# Patient Record
Sex: MALE | Race: WHITE | Employment: UNEMPLOYED | URBAN - METROPOLITAN AREA
[De-identification: names, ages, dates, MRNs, and addresses within clinical notes are randomized per-mention and may not be internally consistent; named-entity substitution may affect disease eponyms.]

---

## 2017-01-03 ENCOUNTER — ANESTHESIA (OUTPATIENT)
Dept: SURGERY | Facility: CLINIC | Age: 37
End: 2017-01-03
Payer: MEDICAID

## 2017-01-03 ENCOUNTER — ANESTHESIA EVENT (OUTPATIENT)
Dept: SURGERY | Facility: CLINIC | Age: 37
End: 2017-01-03
Payer: MEDICAID

## 2017-01-03 ENCOUNTER — APPOINTMENT (OUTPATIENT)
Dept: GENERAL RADIOLOGY | Facility: CLINIC | Age: 37
End: 2017-01-03
Attending: NEUROLOGICAL SURGERY
Payer: MEDICAID

## 2017-01-03 ENCOUNTER — SURGERY (OUTPATIENT)
Age: 37
End: 2017-01-03

## 2017-01-03 PROCEDURE — 25800025 ZZH RX 258: Performed by: NURSE ANESTHETIST, CERTIFIED REGISTERED

## 2017-01-03 PROCEDURE — 25000128 H RX IP 250 OP 636: Performed by: NURSE ANESTHETIST, CERTIFIED REGISTERED

## 2017-01-03 PROCEDURE — 25000125 ZZHC RX 250: Performed by: PHYSICIAN ASSISTANT

## 2017-01-03 PROCEDURE — 40000277 XR SURGERY CARM FLUORO LESS THAN 5 MIN W STILLS: Mod: TC

## 2017-01-03 PROCEDURE — 25000125 ZZHC RX 250: Performed by: NURSE ANESTHETIST, CERTIFIED REGISTERED

## 2017-01-03 RX ORDER — LIDOCAINE HYDROCHLORIDE 20 MG/ML
INJECTION, SOLUTION INFILTRATION; PERINEURAL PRN
Status: DISCONTINUED | OUTPATIENT
Start: 2017-01-03 | End: 2017-01-03

## 2017-01-03 RX ORDER — FENTANYL CITRATE 50 UG/ML
INJECTION, SOLUTION INTRAMUSCULAR; INTRAVENOUS PRN
Status: DISCONTINUED | OUTPATIENT
Start: 2017-01-03 | End: 2017-01-03

## 2017-01-03 RX ORDER — NEOSTIGMINE METHYLSULFATE 1 MG/ML
VIAL (ML) INJECTION PRN
Status: DISCONTINUED | OUTPATIENT
Start: 2017-01-03 | End: 2017-01-03

## 2017-01-03 RX ORDER — PROPOFOL 10 MG/ML
INJECTION, EMULSION INTRAVENOUS PRN
Status: DISCONTINUED | OUTPATIENT
Start: 2017-01-03 | End: 2017-01-03

## 2017-01-03 RX ORDER — ONDANSETRON 2 MG/ML
INJECTION INTRAMUSCULAR; INTRAVENOUS PRN
Status: DISCONTINUED | OUTPATIENT
Start: 2017-01-03 | End: 2017-01-03

## 2017-01-03 RX ORDER — DEXAMETHASONE SODIUM PHOSPHATE 10 MG/ML
INJECTION, SOLUTION INTRAMUSCULAR; INTRAVENOUS PRN
Status: DISCONTINUED | OUTPATIENT
Start: 2017-01-03 | End: 2017-01-03

## 2017-01-03 RX ORDER — DEXMEDETOMIDINE HYDROCHLORIDE 100 UG/ML
INJECTION, SOLUTION INTRAVENOUS CONTINUOUS PRN
Status: DISCONTINUED | OUTPATIENT
Start: 2017-01-03 | End: 2017-01-03

## 2017-01-03 RX ORDER — SODIUM CHLORIDE, SODIUM LACTATE, POTASSIUM CHLORIDE, CALCIUM CHLORIDE 600; 310; 30; 20 MG/100ML; MG/100ML; MG/100ML; MG/100ML
INJECTION, SOLUTION INTRAVENOUS CONTINUOUS PRN
Status: DISCONTINUED | OUTPATIENT
Start: 2017-01-03 | End: 2017-01-03

## 2017-01-03 RX ORDER — VECURONIUM BROMIDE 1 MG/ML
INJECTION, POWDER, LYOPHILIZED, FOR SOLUTION INTRAVENOUS PRN
Status: DISCONTINUED | OUTPATIENT
Start: 2017-01-03 | End: 2017-01-03

## 2017-01-03 RX ORDER — GLYCOPYRROLATE 0.2 MG/ML
INJECTION, SOLUTION INTRAMUSCULAR; INTRAVENOUS PRN
Status: DISCONTINUED | OUTPATIENT
Start: 2017-01-03 | End: 2017-01-03

## 2017-01-03 RX ORDER — EPHEDRINE SULFATE 50 MG/ML
INJECTION, SOLUTION INTRAMUSCULAR; INTRAVENOUS; SUBCUTANEOUS PRN
Status: DISCONTINUED | OUTPATIENT
Start: 2017-01-03 | End: 2017-01-03

## 2017-01-03 RX ADMIN — DEXMEDETOMIDINE 4 MCG: 100 INJECTION, SOLUTION, CONCENTRATE INTRAVENOUS at 09:21

## 2017-01-03 RX ADMIN — DEXMEDETOMIDINE 0.3 MCG/KG/HR: 100 INJECTION, SOLUTION, CONCENTRATE INTRAVENOUS at 07:59

## 2017-01-03 RX ADMIN — METHYLPREDNISOLONE ACETATE 40 MG: 40 INJECTION, SUSPENSION INTRA-ARTICULAR; INTRALESIONAL; INTRAMUSCULAR; SOFT TISSUE at 09:00

## 2017-01-03 RX ADMIN — GLYCOPYRROLATE 0.6 MG: 0.2 INJECTION, SOLUTION INTRAMUSCULAR; INTRAVENOUS at 09:12

## 2017-01-03 RX ADMIN — PHENYLEPHRINE HYDROCHLORIDE 100 MCG: 10 INJECTION, SOLUTION INTRAMUSCULAR; INTRAVENOUS; SUBCUTANEOUS at 08:50

## 2017-01-03 RX ADMIN — DEXAMETHASONE SODIUM PHOSPHATE 4 MG: 10 INJECTION, SOLUTION INTRAMUSCULAR; INTRAVENOUS at 08:05

## 2017-01-03 RX ADMIN — BUPIVACAINE HYDROCHLORIDE AND EPINEPHRINE BITARTRATE 15 ML: 2.5; .005 INJECTION, SOLUTION INFILTRATION; PERINEURAL at 09:08

## 2017-01-03 RX ADMIN — PROPOFOL 200 MG: 10 INJECTION, EMULSION INTRAVENOUS at 07:34

## 2017-01-03 RX ADMIN — VECURONIUM BROMIDE 1 MG: 1 INJECTION, POWDER, LYOPHILIZED, FOR SOLUTION INTRAVENOUS at 08:55

## 2017-01-03 RX ADMIN — Medication 1000 ML: at 08:15

## 2017-01-03 RX ADMIN — SODIUM CHLORIDE, POTASSIUM CHLORIDE, SODIUM LACTATE AND CALCIUM CHLORIDE: 600; 310; 30; 20 INJECTION, SOLUTION INTRAVENOUS at 08:40

## 2017-01-03 RX ADMIN — PHENYLEPHRINE HYDROCHLORIDE 50 MCG: 10 INJECTION, SOLUTION INTRAMUSCULAR; INTRAVENOUS; SUBCUTANEOUS at 08:29

## 2017-01-03 RX ADMIN — CEFAZOLIN SODIUM 2 G: 2 INJECTION, SOLUTION INTRAVENOUS at 07:43

## 2017-01-03 RX ADMIN — ONDANSETRON 4 MG: 2 INJECTION INTRAMUSCULAR; INTRAVENOUS at 09:09

## 2017-01-03 RX ADMIN — DEXMEDETOMIDINE 8 MCG: 100 INJECTION, SOLUTION, CONCENTRATE INTRAVENOUS at 09:12

## 2017-01-03 RX ADMIN — FENTANYL CITRATE 100 MCG: 50 INJECTION, SOLUTION INTRAMUSCULAR; INTRAVENOUS at 07:34

## 2017-01-03 RX ADMIN — PHENYLEPHRINE HYDROCHLORIDE 50 MCG: 10 INJECTION, SOLUTION INTRAMUSCULAR; INTRAVENOUS; SUBCUTANEOUS at 08:40

## 2017-01-03 RX ADMIN — EPHEDRINE SULFATE 5 MG: 50 INJECTION INTRAMUSCULAR; INTRAVENOUS; SUBCUTANEOUS at 08:40

## 2017-01-03 RX ADMIN — EPHEDRINE SULFATE 5 MG: 50 INJECTION INTRAMUSCULAR; INTRAVENOUS; SUBCUTANEOUS at 08:24

## 2017-01-03 RX ADMIN — DEXMEDETOMIDINE 8 MCG: 100 INJECTION, SOLUTION, CONCENTRATE INTRAVENOUS at 09:16

## 2017-01-03 RX ADMIN — LIDOCAINE HYDROCHLORIDE 100 MG: 20 INJECTION, SOLUTION INFILTRATION; PERINEURAL at 07:34

## 2017-01-03 RX ADMIN — SODIUM CHLORIDE, POTASSIUM CHLORIDE, SODIUM LACTATE AND CALCIUM CHLORIDE: 600; 310; 30; 20 INJECTION, SOLUTION INTRAVENOUS at 07:32

## 2017-01-03 RX ADMIN — EPHEDRINE SULFATE 5 MG: 50 INJECTION INTRAMUSCULAR; INTRAVENOUS; SUBCUTANEOUS at 08:29

## 2017-01-03 RX ADMIN — THROMBIN, TOPICAL (BOVINE) 5000 UNITS: KIT at 08:15

## 2017-01-03 RX ADMIN — DEXMEDETOMIDINE 4 MCG: 100 INJECTION, SOLUTION, CONCENTRATE INTRAVENOUS at 09:19

## 2017-01-03 RX ADMIN — NEOSTIGMINE METHYLSULFATE 3 MG: 1 INJECTION INTRAMUSCULAR; INTRAVENOUS; SUBCUTANEOUS at 09:12

## 2017-01-03 RX ADMIN — ROCURONIUM BROMIDE 50 MG: 10 INJECTION INTRAVENOUS at 07:35

## 2017-01-03 RX ADMIN — MIDAZOLAM HYDROCHLORIDE 2 MG: 1 INJECTION, SOLUTION INTRAMUSCULAR; INTRAVENOUS at 07:32

## 2017-01-03 ASSESSMENT — ENCOUNTER SYMPTOMS: SEIZURES: 0

## 2017-01-03 ASSESSMENT — LIFESTYLE VARIABLES: TOBACCO_USE: 1

## 2017-01-03 ASSESSMENT — COPD QUESTIONNAIRES: COPD: 0

## 2017-01-03 NOTE — ANESTHESIA CARE TRANSFER NOTE
Patient: Ko Bell    DISCECTOMY LUMBAR MINIMALLY INVASIVE ONE LEVEL (Left Spine)  Additional InformationProcedure(s):  LUMBAR FOUR TO LUMBAR FIVE LEFT MINIMALLY INVASIVE MICRODISECTOMY  - Wound Class: I-Clean    Diagnosis: LEFT L 4-5 DISC HERNIATION   Diagnosis Additional Information: No value filed.    Anesthesia Type:   General, ETT     Note:  Airway :Face Mask  Patient transferred to:PACU        Vitals: (Last set prior to Anesthesia Care Transfer)              Electronically Signed By: MARTHA Reid CRNA  January 3, 2017  9:30 AM
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
## 2017-01-03 NOTE — ANESTHESIA POSTPROCEDURE EVALUATION
Patient: Ko Bell    DISCECTOMY LUMBAR MINIMALLY INVASIVE ONE LEVEL (Left Spine)  Additional InformationProcedure(s):  LUMBAR FOUR TO LUMBAR FIVE LEFT MINIMALLY INVASIVE MICRODISECTOMY  - Wound Class: I-Clean    Diagnosis:LEFT L 4-5 DISC HERNIATION   Diagnosis Additional Information: No value filed.    Anesthesia Type:  General, ETT    Note:  Anesthesia Post Evaluation    Patient location during evaluation: PACU  Patient participation: Able to fully participate in evaluation  Level of consciousness: awake, awake and alert and responsive to verbal stimuli  Pain management: adequate  Airway patency: patent  Cardiovascular status: acceptable  Respiratory status: acceptable  Hydration status: acceptable  PONV: none     Anesthetic complications: None          Last vitals:  Filed Vitals:    01/03/17 1030 01/03/17 1039 01/03/17 1045   BP: 124/86 110/86    Pulse:      Temp:  36.7  C (98  F)    Resp: 16 16 16   SpO2: 100% 100%        Electronically Signed By: Angela Lyle  January 3, 2017  11:12 AM

## 2017-01-03 NOTE — ADDENDUM NOTE
Addendum  created 01/03/17 1142 by Angela Rubi APRN CRNA    Modules edited: Anesthesia Flowsheet

## 2017-01-04 ENCOUNTER — DOCUMENTATION ONLY (OUTPATIENT)
Dept: NEUROSURGERY | Facility: CLINIC | Age: 37
End: 2017-01-04

## 2017-01-04 NOTE — PROGRESS NOTES
Contacted patient for f/u call, s/p microdiscectomy 1/3/17. No answer, LVM. Advised patient to call back with questions or concerns. Reminded him to schedule his 6 weeks post op appt as well.

## 2017-01-06 ENCOUNTER — TELEPHONE (OUTPATIENT)
Dept: NEUROSURGERY | Facility: CLINIC | Age: 37
End: 2017-01-06

## 2017-01-06 NOTE — TELEPHONE ENCOUNTER
Patient's wife called, requesting oxycodone refill for patient. Informed her we do not have a provider in clinic at this time, the earliest it can be done is Monday morning. She agreed to  in [place] on Monday. Will forward message to Dr. Toth for refill.

## 2017-01-09 DIAGNOSIS — M54.16 LUMBAR RADICULOPATHY: Primary | ICD-10-CM

## 2017-01-09 RX ORDER — OXYCODONE HYDROCHLORIDE 5 MG/1
5-10 TABLET ORAL
Qty: 60 TABLET | Refills: 0 | Status: SHIPPED | OUTPATIENT
Start: 2017-01-09 | End: 2017-01-12

## 2017-01-09 NOTE — TELEPHONE ENCOUNTER
"Dr. Toth refilled oxycodone script. Patient's wife (Reva) coming to [place] to . Reva also called this morning, stating patient's incision has a bump on it, but otherwise \"looks fine\". Denies redness, drainage, any other signs of infection. Advised to have patient come in for an incision check. Reva said she'd rather continue to monitor and call if anything changes.   "

## 2017-01-10 ENCOUNTER — OFFICE VISIT (OUTPATIENT)
Dept: NEUROSURGERY | Facility: CLINIC | Age: 37
End: 2017-01-10
Attending: NURSE PRACTITIONER
Payer: MEDICAID

## 2017-01-10 VITALS
SYSTOLIC BLOOD PRESSURE: 127 MMHG | HEIGHT: 68 IN | WEIGHT: 147 LBS | OXYGEN SATURATION: 99 % | DIASTOLIC BLOOD PRESSURE: 82 MMHG | BODY MASS INDEX: 22.28 KG/M2 | HEART RATE: 98 BPM | TEMPERATURE: 96.8 F

## 2017-01-10 DIAGNOSIS — Z98.890 S/P LUMBAR MICRODISCECTOMY: Primary | ICD-10-CM

## 2017-01-10 PROCEDURE — 40000269 ZZH STATISTIC NO CHARGE FACILITY FEE: Performed by: NURSE PRACTITIONER

## 2017-01-10 RX ORDER — CEPHALEXIN 500 MG/1
500 CAPSULE ORAL 2 TIMES DAILY
Qty: 20 CAPSULE | Refills: 0 | Status: SHIPPED | OUTPATIENT
Start: 2017-01-10 | End: 2017-01-23

## 2017-01-10 ASSESSMENT — PAIN SCALES - GENERAL: PAINLEVEL: SEVERE PAIN (6)

## 2017-01-10 NOTE — PATIENT INSTRUCTIONS
Keflex escribed to patient's pharmacy.   Call clinic if any signs of infection occur: redness, drainage, warmth, swelling, fever.

## 2017-01-10 NOTE — NURSING NOTE
"Ko Bell is a 36 year old male who presents for:  Chief Complaint   Patient presents with     Neurologic Problem     wound check         Initial Vitals:  /82 mmHg  Pulse 98  Temp(Src) 96.8  F (36  C) (Oral)  Ht 5' 8\" (1.727 m)  Wt 147 lb (66.679 kg)  BMI 22.36 kg/m2  SpO2 99% Estimated body mass index is 22.36 kg/(m^2) as calculated from the following:    Height as of this encounter: 5' 8\" (1.727 m).    Weight as of this encounter: 147 lb (66.679 kg).. Body surface area is 1.79 meters squared. BP completed using cuff size: regular  Severe Pain (6)    Do you feel safe in your environment?  Yes  Do you need any refills today? No    Nursing Comments: wound check .  Patient rates his pain today as 6      5 min. nursing intake time  Amanda Evans MA       Discharge plan: Keflex escribed to patient's pharmacy.   Call clinic if any signs of infection occur: redness, drainage, warmth, swelling, fever.   2 min. nursing discharge time  Amanda Evans MA            "

## 2017-01-10 NOTE — MR AVS SNAPSHOT
After Visit Summary   1/10/2017    Ko Bell    MRN: [id number]           Patient Information     Date Of Birth          1980        Visit Information        Provider Department      1/10/2017 1:00 PM Letty Vincent APRN CNP [facility]        Today's Diagnoses     S/P lumbar microdiscectomy    -  1       Care Instructions    Keflex escribed to patient's pharmacy.   Call clinic if any signs of infection occur: redness, drainage, warmth, swelling, fever.         Follow-ups after your visit        Who to contact     If you have questions or need follow up information about today's clinic visit or your schedule please contact [facility] directly at [phone].  Normal or non-critical lab and imaging results will be communicated to you by SkuRunhart, letter or phone within 4 business days after the clinic has received the results. If you do not hear from us within 7 days, please contact the clinic through SkuRunhart or phone. If you have a critical or abnormal lab result, we will notify you by phone as soon as possible.  Submit refill requests through KnowRe or call your pharmacy and they will forward the refill request to us. Please allow 3 business days for your refill to be completed.          Additional Information About Your Visit        MyChart Information     KnowRe gives you secure access to your electronic health record. If you see a primary care provider, you can also send messages to your care team and make appointments. If you have questions, please call your primary care clinic.  If you do not have a primary care provider, please call [phone] and they will assist you.        Care EveryWhere ID     This is your Care EveryWhere ID. This could be used by other organizations to access your [facility] medical records  [id number]        Your Vitals Were     Pulse Temperature Height BMI (Body Mass Index) Pulse Oximetry        "98 96.8  F (36  C) (Oral) 5' 8\" (1.727 m) 22.36 kg/m2 99%        Blood Pressure from Last 3 Encounters:   01/10/17 127/82   01/03/17 110/86   12/30/16 132/88    Weight from Last 3 Encounters:   01/10/17 147 lb (66.679 kg)   01/03/17 147 lb 12.8 oz (67.042 kg)   12/30/16 149 lb (67.586 kg)              Today, you had the following     No orders found for display         Today's Medication Changes          These changes are accurate as of: 1/10/17  1:14 PM.  If you have any questions, ask your nurse or doctor.               Start taking these medicines.        Dose/Directions    cephALEXin 500 MG capsule   Commonly known as:  KEFLEX   Used for:  S/P lumbar microdiscectomy        Dose:  500 mg   Take 1 capsule (500 mg) by mouth 2 times daily   Quantity:  20 capsule   Refills:  0            Where to get your medicines      These medications were sent to [facility] [address] [place] [address] - [address]  AT [facility] [address]  [address] , [facility] [address]     Phone:  [phone]    - cephALEXin 500 MG capsule             Primary Care Provider    None Specified       No primary provider on file.        Thank you!     Thank you for choosing [facility]  for your care. Our goal is always to provide you with excellent care. Hearing back from our patients is one way we can continue to improve our services. Please take a few minutes to complete the written survey that you may receive in the mail after your visit with us. Thank you!             Your Updated Medication List - Protect others around you: Learn how to safely use, store and throw away your medicines at www.disposemymeds.org.          This list is accurate as of: 1/10/17  1:14 PM.  Always use your most recent med list.                   Brand Name Dispense Instructions for use    cephALEXin 500 MG capsule    KEFLEX    20 capsule    Take 1 capsule (500 mg) by mouth 2 times daily       oxyCODONE 5 MG " IR tablet    ROXICODONE    60 tablet    Take 1-2 tablets (5-10 mg) by mouth every 3 hours as needed for pain or other (Moderate to Severe)       SEROQUEL PO      Take 100 mg by mouth At Bedtime

## 2017-01-10 NOTE — NURSING NOTE
"Patient is s/p L4-5 microdiscectomy on 1/3/17 with Dr. Toth. Patient came to clinic today for incision check. There is swelling from top to bottom of incision, no redness, warmth, drainage, or fever. Patient's pain is minimal at the incision site, but feels like he's \"laying on a tennis ball\" at times. Letty Vincent CNP also examined the site and recommended Keflex. Escribed to patient's pharmacy. Advised patient and wife to call clinic if any signs of infection or changes occur.   "

## 2017-01-11 ENCOUNTER — TELEPHONE (OUTPATIENT)
Dept: NEUROSURGERY | Facility: CLINIC | Age: 37
End: 2017-01-11

## 2017-01-11 DIAGNOSIS — M54.16 LUMBAR RADICULOPATHY: Primary | ICD-10-CM

## 2017-01-11 NOTE — TELEPHONE ENCOUNTER
Patient requesting refill for oxycodone. Please mail to [facility] in [place]. Will communicate with Kirt Madera PA-C for approval.

## 2017-01-11 NOTE — TELEPHONE ENCOUNTER
Reva called and wants to talk to Isadora in regards to Ko. She wanted to follow up about Ko's bulge on his back, and well as refilling his medication

## 2017-01-12 RX ORDER — OXYCODONE HYDROCHLORIDE 5 MG/1
5-10 TABLET ORAL
Qty: 60 TABLET | Refills: 0 | Status: SHIPPED | OUTPATIENT
Start: 2017-01-12 | End: 2017-01-20

## 2017-01-16 ENCOUNTER — MYC REFILL (OUTPATIENT)
Dept: NEUROSURGERY | Facility: CLINIC | Age: 37
End: 2017-01-16

## 2017-01-18 ENCOUNTER — TELEPHONE (OUTPATIENT)
Dept: NEUROSURGERY | Facility: CLINIC | Age: 37
End: 2017-01-18

## 2017-01-20 ENCOUNTER — HOSPITAL ENCOUNTER (EMERGENCY)
Facility: CLINIC | Age: 37
Discharge: HOME | End: 2017-01-20
Attending: EMERGENCY MEDICINE | Admitting: EMERGENCY MEDICINE
Payer: MEDICAID

## 2017-01-20 VITALS
OXYGEN SATURATION: 100 % | HEIGHT: 67 IN | SYSTOLIC BLOOD PRESSURE: 136 MMHG | HEART RATE: 91 BPM | WEIGHT: 154 LBS | TEMPERATURE: 98.1 F | BODY MASS INDEX: 24.17 KG/M2 | DIASTOLIC BLOOD PRESSURE: 87 MMHG | RESPIRATION RATE: 18 BRPM

## 2017-01-20 DIAGNOSIS — G89.18 POSTOPERATIVE PAIN: ICD-10-CM

## 2017-01-20 LAB
ANION GAP SERPL CALCULATED.3IONS-SCNC: 8 MMOL/L (ref 3–14)
APPEARANCE FLD: NORMAL
BASOPHILS # BLD AUTO: 0 10E9/L (ref 0–0.2)
BASOPHILS NFR BLD AUTO: 0.7 %
BUN SERPL-MCNC: 16 MG/DL (ref 7–30)
CALCIUM SERPL-MCNC: 8.5 MG/DL (ref 8.5–10.1)
CHLORIDE SERPL-SCNC: 104 MMOL/L (ref 94–109)
CO2 SERPL-SCNC: 26 MMOL/L (ref 20–32)
COLOR FLD: NORMAL
CREAT SERPL-MCNC: 0.82 MG/DL (ref 0.66–1.25)
CRP SERPL-MCNC: <2.9 MG/L (ref 0–8)
DIFFERENTIAL METHOD BLD: NORMAL
EOSINOPHIL # BLD AUTO: 0.3 10E9/L (ref 0–0.7)
EOSINOPHIL NFR BLD AUTO: 4.5 %
EOSINOPHIL NFR FLD MANUAL: 8 %
ERYTHROCYTE [DISTWIDTH] IN BLOOD BY AUTOMATED COUNT: 12.7 % (ref 10–15)
ERYTHROCYTE [SEDIMENTATION RATE] IN BLOOD BY WESTERGREN METHOD: 6 MM/H (ref 0–15)
GFR SERPL CREATININE-BSD FRML MDRD: NORMAL ML/MIN/1.7M2
GLUCOSE SERPL-MCNC: 89 MG/DL (ref 70–99)
GRAM STN SPEC: NORMAL
HCT VFR BLD AUTO: 45.5 % (ref 40–53)
HGB BLD-MCNC: 15.3 G/DL (ref 13.3–17.7)
IMM GRANULOCYTES # BLD: 0 10E9/L (ref 0–0.4)
IMM GRANULOCYTES NFR BLD: 0.5 %
LYMPHOCYTES # BLD AUTO: 2 10E9/L (ref 0.8–5.3)
LYMPHOCYTES NFR BLD AUTO: 34.9 %
LYMPHOCYTES NFR FLD MANUAL: 41 %
MCH RBC QN AUTO: 29.3 PG (ref 26.5–33)
MCHC RBC AUTO-ENTMCNC: 33.6 G/DL (ref 31.5–36.5)
MCV RBC AUTO: 87 FL (ref 78–100)
MICRO REPORT STATUS: NORMAL
MONOCYTES # BLD AUTO: 0.5 10E9/L (ref 0–1.3)
MONOCYTES NFR BLD AUTO: 7.7 %
MONOS+MACROS NFR FLD MANUAL: 9 %
NEUTROPHILS # BLD AUTO: 3 10E9/L (ref 1.6–8.3)
NEUTROPHILS NFR BLD AUTO: 51.7 %
NEUTS BAND NFR FLD MANUAL: 42 %
NRBC # BLD AUTO: 0 10*3/UL
NRBC BLD AUTO-RTO: 0 /100
PLATELET # BLD AUTO: 165 10E9/L (ref 150–450)
POTASSIUM SERPL-SCNC: 4 MMOL/L (ref 3.4–5.3)
RBC # BLD AUTO: 5.23 10E12/L (ref 4.4–5.9)
SODIUM SERPL-SCNC: 138 MMOL/L (ref 133–144)
SPECIMEN SOURCE FLD: NORMAL
SPECIMEN SOURCE: NORMAL
WBC # BLD AUTO: 5.8 10E9/L (ref 4–11)
WBC # FLD AUTO: 343 /UL

## 2017-01-20 PROCEDURE — 86140 C-REACTIVE PROTEIN: CPT | Performed by: EMERGENCY MEDICINE

## 2017-01-20 PROCEDURE — 85025 COMPLETE CBC W/AUTO DIFF WBC: CPT | Performed by: EMERGENCY MEDICINE

## 2017-01-20 PROCEDURE — 89051 BODY FLUID CELL COUNT: CPT | Performed by: EMERGENCY MEDICINE

## 2017-01-20 PROCEDURE — 80048 BASIC METABOLIC PNL TOTAL CA: CPT | Performed by: EMERGENCY MEDICINE

## 2017-01-20 PROCEDURE — 76705 ECHO EXAM OF ABDOMEN: CPT

## 2017-01-20 PROCEDURE — 99284 EMERGENCY DEPT VISIT MOD MDM: CPT | Mod: 25

## 2017-01-20 PROCEDURE — 85652 RBC SED RATE AUTOMATED: CPT | Performed by: EMERGENCY MEDICINE

## 2017-01-20 PROCEDURE — 87070 CULTURE OTHR SPECIMN AEROBIC: CPT | Performed by: EMERGENCY MEDICINE

## 2017-01-20 PROCEDURE — 87205 SMEAR GRAM STAIN: CPT | Performed by: EMERGENCY MEDICINE

## 2017-01-20 PROCEDURE — 10140 I&D HMTMA SEROMA/FLUID COLLJ: CPT

## 2017-01-20 RX ORDER — OXYCODONE HYDROCHLORIDE 5 MG/1
5-10 TABLET ORAL EVERY 4 HOURS PRN
Qty: 20 TABLET | Refills: 0 | Status: SHIPPED | OUTPATIENT
Start: 2017-01-20 | End: 2017-01-27

## 2017-01-20 RX ORDER — LIDOCAINE HYDROCHLORIDE AND EPINEPHRINE 10; 10 MG/ML; UG/ML
INJECTION, SOLUTION INFILTRATION; PERINEURAL
Status: DISCONTINUED
Start: 2017-01-20 | End: 2017-01-20 | Stop reason: HOSPADM

## 2017-01-20 RX ORDER — LIDOCAINE 50 MG/G
1 PATCH TOPICAL EVERY 24 HOURS
Qty: 10 PATCH | Refills: 0 | Status: SHIPPED | OUTPATIENT
Start: 2017-01-20 | End: 2017-01-31

## 2017-01-20 ASSESSMENT — ENCOUNTER SYMPTOMS
BACK PAIN: 1
FEVER: 0
NUMBNESS: 1

## 2017-01-20 NOTE — ED PROVIDER NOTES
"  History     Chief Complaint:  Post-op Problem    HPI:    Ko Bell is a 36 year old male with a history of bipolar disorder and lumbar radiculopathy who presents with a post-operative problem. The patient reports that he had a lumbar microdiscectomy performed on 1/3/2017 for lower back pain and left leg and foot numbness and tingling. Although he was initially feeling better and symptom free for the first few days after surgery, his lower back pain and left leg numbness has been gradually returning.  He has had incisional swelling persistent despite completing a course of Keflex.  He notes that the inflammation spot moves around depending on his position and is causing enough pain to prevent him from bending over. No fevers.  Called clinic for an appointment but his treatment team is out of state.    Allergies:  No known drug allergies       Medications:    Oxycodone  Keflex  Seroquel      Past Medical History:    Bipolar disorder  Lumbar radiculopathy  Herniation of intervertebral disc  Impetigo     Past Surgical History:    Sinus surgery x2  Discectomy Lumbar     Family History:    Cancer- Brother  Alcoholism- Father  Depression- Mother     Social History:  Smoking status: Current everyday smoker  Alcohol use: No   Marital Status:        Review of Systems   Constitutional: Negative for fever.   Musculoskeletal: Positive for back pain.   Skin:        Raised bump over surgical incision site   Neurological: Positive for numbness.        Left leg and foot   All other systems reviewed and are negative.      Physical Exam     Patient Vitals for the past 24 hrs:   BP Temp Temp src Pulse Resp SpO2 Height Weight   01/20/17 0922 136/87 mmHg 98.1  F (36.7  C) Temporal 91 18 100 % 1.702 m (5' 7\") 69.854 kg (154 lb)      Physical Exam:  General: Cooperative, appears uncomfortable  Head:  The scalp, face, and head appear normal and symmetric  Eyes:  Sclera white; pupils equal  ENT:    External ears and nares " normal  CV:  Regular rate and rhythm  Resp:  Breath sounds clear and equal bilaterally  GI:  Abdomen is soft, non-tender, non-distended  MS:  Moves all extremities    Midline incision well healed without cellulitis, large fluid collection with fluctuance  Neuro: Speech is normal and fluent.     Strength 5/5 in BLE including hip flex/add/abd, knee flex/ext, DF, PF, EHL.    Sensation symmetric in BLE.    No perianal anesthesia      Emergency Department Course     Imaging:  Radiographic findings were communicated with the patient who voiced understanding of the findings.    POC US Soft Tissue:  Final Result by Alyx Joseph MD (01/20/2017 11:09:03)  Impression:  Limited Bedside Soft Tissue Ultrasound    Performed by: Dr. Joseph  Indication: Incisional swelling and pain; evaluate for abscess  Body area(s) imaged: Lumbar back  Findings: Simple appearing soft tissue fluid collection at depth  ranging 0.5-1.0cm along length of incision.  No area clearly  tracking deeper.    Impression: Post operative fluid collection    Laboratory:  CBC: WNL (WBC 5.8, HGB 15.3, )    Basal Metabolic Panel: WNL (Creatinine 0.82)  CRP Inflammation: <2.9  Erythrocyte Sedimentation: Pending  Fluid Culture Aerobic: Pending  Cell Count: WNL  Gram Stain: Pending    Procedures:    Procedure: Incision and Drainage   LOCATIONS:  Lumbar spine over surgical incision site     ANESTHESIA:  Local field block using Lidocaine 1% with epinephrine, total of 1 mLs     PREPARATION:  Cleansed with Betadine     PROCEDURE:  Area was incised with 19G needle.  Wound treatment included aspiration of 8 mL of serosanguineous fluid.  Packing consisted of No Packing.  Appropriate dressing was applied to cover the area.    Patient Status:  Patient tolerated the procedure well. There were no complications.  Compression dressing applied to limit reaccumulation.    Emergency Department Course:  Past medical records, nursing notes, and vitals reviewed.  1003:  I performed an exam of the patient and obtained history, as documented above.    IV inserted and blood drawn.     I performed a bedside ultrasound of the surgical hernia while in the emergency department, findings above.      1025: An incision and drainage procedure was performed as noted above. Fluid cultures were collected and tested.    1204: I spoke with laboratory about the expected result time for the cell count.    1310: I rechecked the patient. Laboratory findings and plan explained to the Patient. Patient discharged home with instructions regarding supportive care, medications, and reasons to return. The importance of close follow-up was reviewed.      Impression & Plan      Medical Decision Making:  Ko Bell is a 36 year old male who presents for evaluation of a post-operative fluid collection that is also painful. He has some pre-operative symptom recurrence concerning for a process that might be recurrently impinging on the nerve, such as an infection. The site was evaluated and there was no evidence of cellulitis, there is fluctuance concerning for a fluid collection, but no tenderness. An ultrasound was performed and demonstrates a soft tissue fluid collection with no obvious communication towards the spinal canal. Blood work was obtained to evaluate for sepsis or signs of infection; inflammatory markers such as white blood cell count and CRP returned normal. Fluid aspiration was discussed with him, as work up will vary whether it looks infected or not. This fluid was serosanguinous with no purulent component. The cell count is consistent with this finding. This does not appear to be acutely infected and additional antibiotics were not indicated. We discussed MRI to check on the post-operative site. However, with no sign of infection, this can certainly be deferred until his evaluation by his spinal specialist on 1/30/2017. This is the approach that he would prefer. He will return immediately  for worsening symptoms, weakness in the leg, or development of fevers. His wife was present and questions were answered. Additional pain medications were prescribed.    Diagnosis:    ICD-10-CM   1. Postoperative pain G89.18   2. Seroma T14.8     Discharge Medications:   Details   lidocaine (LIDODERM) 5 % Patch Place 1 patch onto the skin every 24 hours for 10 daysDisp-10 patch, R-0Local Print     Lindsay Plascencia  1/20/2017   [facility] EMERGENCY DEPARTMENT    ILindsay, am serving as a scribe at 10:03 AM on 1/20/2017 to document services personally performed by Alyx Joseph MD based on my observations and the provider's statements to me.      Alyx Joseph MD  01/20/17 1513

## 2017-01-20 NOTE — ED NOTES
Pt reports area of swelling at incision site. Pt was seen by surgeon given antibiotic which he finished and now continued swelling in area sent to ED surgeon is out of state on conference per pt.

## 2017-01-20 NOTE — ED AVS SNAPSHOT
[facility]    [address]    [facility] [address]    Phone:  [phone]    Fax:  [phone]                                       Ko Bell   MRN: [id number]    Department:  [facility]   Date of Visit:  1/20/2017           Patient Information     Date Of Birth          1980        Your diagnoses for this visit were:     Postoperative pain     Seroma        You were seen by Alyx Joseph MD.      Follow-up Information     Follow up with Your surgery clinic.    Why:  3-5 days for recheck        Follow up with [facility].    Specialty:  EMERGENCY MEDICINE    Why:  If symptoms worsen    Contact information:    [address]  [facility] [address] [phone]        Discharge Instructions         Discharge Instructions  Back Pain  You were seen today for back pain. Back pain can have many causes, but most will get better without surgery or other specific treatment. Sometimes there is a herniated ( slipped ) disc. We don t usually do MRI scans to look for these right away, since most herniated discs will get better on their own with time.  Today, we did not find any evidence that your back pain was caused by a serious condition, such as an infection, fracture, or tumor. However, sometimes symptoms develop over time and cannot be found during an emergency visit, so it is very important that you follow up with your primary doctor.  Return to the Emergency Department if:    You develop a fever with your back pain.     You have weakness or change in sensation in one or both legs.    You lose control of your bowels or bladder, or can t empty your bladder.    Your pain gets much worse.     Follow-up with your doctor:    Unless your pain has completely gone away, please make an appointment with your doctor within one week.  You may need further management of your back  pain, such as more pain medication, imaging such as an X-ray or MRI, or physical therapy.    What can I do to help myself?    Remain Active -- People are often afraid that they will hurt their back further or delay recovery by remaining active, but this is one of the best things you can do for your back. In fact, prolonged bed rest is not recommended. Studies have shown that people with low back pain recover faster when they remain active. Movement helps to bring blood flow to the muscles and relieve muscle spasms as well as preventing loss of muscle strength.    Heat -- Using a heating pad can help with low back pain during the first few weeks. Do not sleep with a heating pad, as you can be burned.     Pain medications - You may take a pain medication such as Tylenol  (acetaminophen), Advil , Nuprin  (ibuprofen) or Aleve  (naproxen).  If you have been given a narcotic such as Vicodin  (hydrocodone with acetaminophen), Percocet  (oxycodone with acetaminophen), codeine, or a muscle relaxant such as Flexeril  (cyclobenzaprine) or Soma  (carisoprodol), do not drive for four hours after you have taken it. If the narcotic contains Tylenol  (acetaminophen), do not take Tylenol  with it. All narcotics will cause constipation, so eat a high fiber diet.   If you were given a prescription for medicine here today, be sure to read all of the information (including the package insert) that comes with your prescription.  This will include important information about the medicine, its side effects, and any warnings that you need to know about.  The pharmacist who fills the prescription can provide more information and answer questions you may have about the medicine.  If you have questions or concerns that the pharmacist cannot address, please call or return to the Emergency Department.   Opioid Medication Information    Pain medications are among the most commonly prescribed medicines, so we are including this information for all  our patients. If you did not receive pain medication or get a prescription for pain medicine, you can ignore it.     You may have been given a prescription for an opioid (narcotic) pain medicine and/or have received a pain medicine while here in the Emergency Department. These medicines can make you drowsy or impaired. You must not drive, operate dangerous equipment, or engage in any other dangerous activities while taking these medications. If you drive while taking these medications, you could be arrested for DUI, or driving under the influence. Do not drink any alcohol while you are taking these medications.     Opioid pain medications can cause addiction. If you have a history of chemical dependency of any type, you are at a higher risk of becoming addicted to pain medications.  Only take these prescribed medications to treat your pain when all other options have been tried. Take it for as short a time and as few doses as possible. Store your pain pills in a secure place, as they are frequently stolen and provide a dangerous opportunity for children or visitors in your house to start abusing these powerful medications. We will not replace any lost or stolen medicine.  As soon as your pain is better, you should flush all your remaining medication.     Many prescription pain medications contain Tylenol  (acetaminophen), including Vicodin , Tylenol #3 , Norco , Lortab , and Percocet .  You should not take any extra pills of Tylenol  if you are using these prescription medications or you can get very sick.  Do not ever take more than 3000 mg of acetaminophen in any 24 hour period.    All opioids tend to cause constipation. Drink plenty of water and eat foods that have a lot of fiber, such as fruits, vegetables, prune juice, apple juice and high fiber cereal.  Take a laxative if you don t move your bowels at least every other day. Miralax , Milk of Magnesia, Colace , or Senna  can be used to keep you regular.       Remember that you can always come back to the Emergency Department if you are not able to see your regular doctor in the amount of time listed above, if you get any new symptoms, or if there is anything that worries you.        24 Hour Appointment Hotline       To make an appointment at any [facility], call [phone] ([phone]). If you don't have a family doctor or clinic, we will help you find one. [place] clinics are conveniently located to serve the needs of you and your family.             Review of your medicines      START taking        Dose / Directions Last dose taken    lidocaine 5 % Patch   Commonly known as:  LIDODERM   Dose:  1 patch   Quantity:  10 patch        Place 1 patch onto the skin every 24 hours for 10 days   Refills:  0          CONTINUE these medicines which may have CHANGED, or have new prescriptions. If we are uncertain of the size of tablets/capsules you have at home, strength may be listed as something that might have changed.        Dose / Directions Last dose taken    oxyCODONE 5 MG IR tablet   Commonly known as:  ROXICODONE   Dose:  5-10 mg   What changed:    - when to take this  - reasons to take this   Quantity:  20 tablet        Take 1-2 tablets (5-10 mg) by mouth every 4 hours as needed for pain   Refills:  0          Our records show that you are taking the medicines listed below. If these are incorrect, please call your family doctor or clinic.        Dose / Directions Last dose taken    cephALEXin 500 MG capsule   Commonly known as:  KEFLEX   Dose:  500 mg   Quantity:  20 capsule        Take 1 capsule (500 mg) by mouth 2 times daily   Refills:  0        SEROQUEL PO   Dose:  100 mg        Take 100 mg by mouth At Bedtime   Refills:  0                Prescriptions were sent or printed at these locations (2 Prescriptions)                   Other Prescriptions                Printed at Department/Unit printer (2 of 2)         oxyCODONE (ROXICODONE) 5 MG IR tablet                lidocaine (LIDODERM) 5 % Patch                Procedures and tests performed during your visit     Basic metabolic panel    CBC with platelets differential    CRP inflammation    Cell count with differential fluid    Erythrocyte sedimentation rate auto    Fluid Culture Aerobic Bacterial    Gram stain    POC US SOFT TISSUE    Peripheral IV catheter      Orders Needing Specimen Collection     None      Pending Results     Date and Time Order Name Status Description    1/20/2017 1054 Fluid Culture Aerobic Bacterial In process     1/20/2017 1054 Gram stain In process     1/20/2017 1030 Erythrocyte sedimentation rate auto In process             Pending Culture Results     Date and Time Order Name Status Description    1/20/2017 1054 Fluid Culture Aerobic Bacterial In process     1/20/2017 1054 Gram stain In process        Test Results from your hospital stay           1/20/2017 11:22 AM - Interface, Flexilab Results      Component Results     Component Value Ref Range & Units Status    WBC 5.8 4.0 - 11.0 10e9/L Final    RBC Count 5.23 4.4 - 5.9 10e12/L Final    Hemoglobin 15.3 13.3 - 17.7 g/dL Final    Hematocrit 45.5 40.0 - 53.0 % Final    MCV 87 78 - 100 fl Final    MCH 29.3 26.5 - 33.0 pg Final    MCHC 33.6 31.5 - 36.5 g/dL Final    RDW 12.7 10.0 - 15.0 % Final    Platelet Count 165 150 - 450 10e9/L Final    Diff Method Automated Method  Final    % Neutrophils 51.7 % Final    % Lymphocytes 34.9 % Final    % Monocytes 7.7 % Final    % Eosinophils 4.5 % Final    % Basophils 0.7 % Final    % Immature Granulocytes 0.5 % Final    Nucleated RBCs 0 0 /100 Final    Absolute Neutrophil 3.0 1.6 - 8.3 10e9/L Final    Absolute Lymphocytes 2.0 0.8 - 5.3 10e9/L Final    Absolute Monocytes 0.5 0.0 - 1.3 10e9/L Final    Absolute Eosinophils 0.3 0.0 - 0.7 10e9/L Final    Absolute Basophils 0.0 0.0 - 0.2 10e9/L Final    Abs Immature Granulocytes 0.0 0 - 0.4 10e9/L Final    Absolute Nucleated RBC 0.0  Final          1/20/2017 11:36 AM - Interface, Flexilab Results      Component Results     Component Value Ref Range & Units Status    Sodium 138 133 - 144 mmol/L Final    Potassium 4.0 3.4 - 5.3 mmol/L Final    Chloride 104 94 - 109 mmol/L Final    Carbon Dioxide 26 20 - 32 mmol/L Final    Anion Gap 8 3 - 14 mmol/L Final    Glucose 89 70 - 99 mg/dL Final    Urea Nitrogen 16 7 - 30 mg/dL Final    Creatinine 0.82 0.66 - 1.25 mg/dL Final    GFR Estimate >90  Non  GFR Calc   >60 mL/min/1.7m2 Final    GFR Estimate If Black >90   GFR Calc   >60 mL/min/1.7m2 Final    Calcium 8.5 8.5 - 10.1 mg/dL Final         1/20/2017 11:36 AM - Interface, Flexilab Results      Component Results     Component Value Ref Range & Units Status    CRP Inflammation <2.9 0.0 - 8.0 mg/L Final         1/20/2017 11:18 AM - Interface, Flexilab Results         1/20/2017 11:10 AM - Alyx Joseph MD      Impression     Limited Bedside Soft Tissue Ultrasound    Performed by: Dr. Joseph  Indication: Incisional swelling and pain; evaluate for abscess  Body area(s) imaged: Lumbar back  Findings: Simple appearing soft tissue fluid collection at depth  ranging 0.5-1.0cm along length of incision.  No area clearly  tracking deeper.    Impression: Post operative fluid collection           1/20/2017 12:41 PM - Interface, Flexilab Results      Component Results     Component Value Ref Range & Units Status    Body Fluid Analysis Source BACK INCISION  Final    % Neutrophils Fluid 42 % Final    % Lymphocytes Fluid 41 % Final    % Mono/Macro Fluid 9 % Final    % Eosinophils Fluid 8 % Final    Color Fluid Slightly Bloody  Final    Appearance Fluid Slightly Cloudy  Final    WBC Fluid 343 /uL Final         1/20/2017 11:08 AM - Interface, Flexilab Results         1/20/2017 11:08 AM - Interface, Flexilab Results                Clinical Quality Measure: Blood Pressure Screening     Your blood pressure was checked while you were in the emergency  department today. The last reading we obtained was  BP: 136/87 mmHg . Please read the guidelines below about what these numbers mean and what you should do about them.  If your systolic blood pressure (the top number) is less than 120 and your diastolic blood pressure (the bottom number) is less than 80, then your blood pressure is normal. There is nothing more that you need to do about it.  If your systolic blood pressure (the top number) is 120-139 or your diastolic blood pressure (the bottom number) is 80-89, your blood pressure may be higher than it should be. You should have your blood pressure rechecked within a year by a primary care provider.  If your systolic blood pressure (the top number) is 140 or greater or your diastolic blood pressure (the bottom number) is 90 or greater, you may have high blood pressure. High blood pressure is treatable, but if left untreated over time it can put you at risk for heart attack, stroke, or kidney failure. You should have your blood pressure rechecked by a primary care provider within the next 4 weeks.  If your provider in the emergency department today gave you specific instructions to follow-up with your doctor or provider even sooner than that, you should follow that instruction and not wait for up to 4 weeks for your follow-up visit.        Thank you for choosing [facility]       Thank you for choosing [facility] for your care. Our goal is always to provide you with excellent care. Hearing back from our patients is one way we can continue to improve our services. Please take a few minutes to complete the written survey that you may receive in the mail after you visit with us. Thank you!        [facility] Information     [facility] gives you secure access to your electronic health record. If you see a primary care provider, you can also send messages to your care team and make appointments. If you have questions, please call your primary care clinic.  If you do not have a  primary care provider, please call [phone] and they will assist you.        Care EveryWhere ID     This is your Care EveryWhere ID. This could be used by other organizations to access your [facility] medical records  [id number]        After Visit Summary       This is your record. Keep this with you and show to your community pharmacist(s) and doctor(s) at your next visit.

## 2017-01-20 NOTE — ED AVS SNAPSHOT
[facility] Emergency Department    [address]    [facility] [address]    Phone:  [phone]    Fax:  [phone]                                       Ko Bell   MRN: [id number]    Department:  [facility] Emergency Department   Date of Visit:  1/20/2017           After Visit Summary Signature Page     I have received my discharge instructions, and my questions have been answered. I have discussed any challenges I see with this plan with the nurse or doctor.    ..........................................................................................................................................  Patient/Patient Representative Signature      ..........................................................................................................................................  Patient Representative Print Name and Relationship to Patient    ..................................................               ................................................  Date                                            Time    ..........................................................................................................................................  Reviewed by Signature/Title    ...................................................              ..............................................  Date                                                            Time

## 2017-01-20 NOTE — DISCHARGE INSTRUCTIONS
Discharge Instructions  Back Pain  You were seen today for back pain. Back pain can have many causes, but most will get better without surgery or other specific treatment. Sometimes there is a herniated ( slipped ) disc. We don t usually do MRI scans to look for these right away, since most herniated discs will get better on their own with time.  Today, we did not find any evidence that your back pain was caused by a serious condition, such as an infection, fracture, or tumor. However, sometimes symptoms develop over time and cannot be found during an emergency visit, so it is very important that you follow up with your primary doctor.  Return to the Emergency Department if:    You develop a fever with your back pain.     You have weakness or change in sensation in one or both legs.    You lose control of your bowels or bladder, or can t empty your bladder.    Your pain gets much worse.     Follow-up with your doctor:    Unless your pain has completely gone away, please make an appointment with your doctor within one week.  You may need further management of your back pain, such as more pain medication, imaging such as an X-ray or MRI, or physical therapy.    What can I do to help myself?    Remain Active -- People are often afraid that they will hurt their back further or delay recovery by remaining active, but this is one of the best things you can do for your back. In fact, prolonged bed rest is not recommended. Studies have shown that people with low back pain recover faster when they remain active. Movement helps to bring blood flow to the muscles and relieve muscle spasms as well as preventing loss of muscle strength.    Heat -- Using a heating pad can help with low back pain during the first few weeks. Do not sleep with a heating pad, as you can be burned.     Pain medications - You may take a pain medication such as Tylenol  (acetaminophen), Advil , Nuprin  (ibuprofen) or Aleve  (naproxen).  If you have  been given a narcotic such as Vicodin  (hydrocodone with acetaminophen), Percocet  (oxycodone with acetaminophen), codeine, or a muscle relaxant such as Flexeril  (cyclobenzaprine) or Soma  (carisoprodol), do not drive for four hours after you have taken it. If the narcotic contains Tylenol  (acetaminophen), do not take Tylenol  with it. All narcotics will cause constipation, so eat a high fiber diet.   If you were given a prescription for medicine here today, be sure to read all of the information (including the package insert) that comes with your prescription.  This will include important information about the medicine, its side effects, and any warnings that you need to know about.  The pharmacist who fills the prescription can provide more information and answer questions you may have about the medicine.  If you have questions or concerns that the pharmacist cannot address, please call or return to the Emergency Department.   Opioid Medication Information    Pain medications are among the most commonly prescribed medicines, so we are including this information for all our patients. If you did not receive pain medication or get a prescription for pain medicine, you can ignore it.     You may have been given a prescription for an opioid (narcotic) pain medicine and/or have received a pain medicine while here in the Emergency Department. These medicines can make you drowsy or impaired. You must not drive, operate dangerous equipment, or engage in any other dangerous activities while taking these medications. If you drive while taking these medications, you could be arrested for DUI, or driving under the influence. Do not drink any alcohol while you are taking these medications.     Opioid pain medications can cause addiction. If you have a history of chemical dependency of any type, you are at a higher risk of becoming addicted to pain medications.  Only take these prescribed medications to treat your pain when  all other options have been tried. Take it for as short a time and as few doses as possible. Store your pain pills in a secure place, as they are frequently stolen and provide a dangerous opportunity for children or visitors in your house to start abusing these powerful medications. We will not replace any lost or stolen medicine.  As soon as your pain is better, you should flush all your remaining medication.     Many prescription pain medications contain Tylenol  (acetaminophen), including Vicodin , Tylenol #3 , Norco , Lortab , and Percocet .  You should not take any extra pills of Tylenol  if you are using these prescription medications or you can get very sick.  Do not ever take more than 3000 mg of acetaminophen in any 24 hour period.    All opioids tend to cause constipation. Drink plenty of water and eat foods that have a lot of fiber, such as fruits, vegetables, prune juice, apple juice and high fiber cereal.  Take a laxative if you don t move your bowels at least every other day. Miralax , Milk of Magnesia, Colace , or Senna  can be used to keep you regular.      Remember that you can always come back to the Emergency Department if you are not able to see your regular doctor in the amount of time listed above, if you get any new symptoms, or if there is anything that worries you.

## 2017-01-23 ENCOUNTER — TELEPHONE (OUTPATIENT)
Dept: NEUROSURGERY | Facility: CLINIC | Age: 37
End: 2017-01-23

## 2017-01-23 ENCOUNTER — OFFICE VISIT (OUTPATIENT)
Dept: NEUROSURGERY | Facility: CLINIC | Age: 37
End: 2017-01-23
Attending: NEUROLOGICAL SURGERY
Payer: OTHER MISCELLANEOUS

## 2017-01-23 VITALS
HEART RATE: 82 BPM | DIASTOLIC BLOOD PRESSURE: 78 MMHG | WEIGHT: 156 LBS | OXYGEN SATURATION: 97 % | BODY MASS INDEX: 24.43 KG/M2 | TEMPERATURE: 97 F | SYSTOLIC BLOOD PRESSURE: 120 MMHG

## 2017-01-23 DIAGNOSIS — Z98.890 S/P LUMBAR MICRODISCECTOMY: Primary | ICD-10-CM

## 2017-01-23 PROCEDURE — 40000269 ZZH STATISTIC NO CHARGE FACILITY FEE: Performed by: NEUROLOGICAL SURGERY

## 2017-01-23 PROCEDURE — 99024 POSTOP FOLLOW-UP VISIT: CPT | Performed by: NEUROLOGICAL SURGERY

## 2017-01-23 NOTE — TELEPHONE ENCOUNTER
Spoke with Dr. Toth, who recommends patient be seen today in clinic. Contacted [facility], who is able to bring patient today at 3pm.

## 2017-01-23 NOTE — TELEPHONE ENCOUNTER
Pt's girlfriend called, states pt is having swelling at the incision site. Would like call back to discuss.

## 2017-01-23 NOTE — NURSING NOTE
"Ko Bell is a 36 year old male who presents for:  Chief Complaint   Patient presents with     Neurologic Problem     incision check        Initial Vitals:  /78 mmHg  Pulse 82  Temp(Src) 97  F (36.1  C) (Oral)  Wt 156 lb (70.761 kg)  SpO2 97% Estimated body mass index is 24.43 kg/(m^2) as calculated from the following:    Height as of 1/20/17: 5' 7\" (1.702 m).    Weight as of this encounter: 156 lb (70.761 kg).. There is no height on file to calculate BSA. BP completed using cuff size: large  Data Unavailable    Do you feel safe in your environment?  Yes  Do you need any refills today? No    Nursing Comments: Incision check; tissue fluid .  Patient rates 5-7 pain today as 1/23/17      5 min. nursing intake time  Silvana Marshall CMA      Discharge plan: See doctors' dictation.  2 min. nursing discharge time  Silvana Marshall CMA           "

## 2017-01-23 NOTE — PROGRESS NOTES
Patient presents today for follow-up.  He has had significant superficial swelling underneath the incision site.  No erythema or concern for infection.  He was seen at the emergency room at [facility], where the wound was aspirated.  Cultures were negative, and he has no fevers.  We discussed that this is most likely superficial hematoma.  We discussed the importance of activity modification.  We'll obtain an MRI to evaluate.  The patient is neurologically intact and there is no wound drainage.

## 2017-01-24 ENCOUNTER — TRANSFERRED RECORDS (OUTPATIENT)
Dept: HEALTH INFORMATION MANAGEMENT | Facility: CLINIC | Age: 37
End: 2017-01-24

## 2017-01-25 LAB
BACTERIA SPEC CULT: NO GROWTH
MICRO REPORT STATUS: NORMAL
SPECIMEN SOURCE: NORMAL

## 2017-01-27 ENCOUNTER — TELEPHONE (OUTPATIENT)
Dept: NEUROSURGERY | Facility: CLINIC | Age: 37
End: 2017-01-27

## 2017-01-27 DIAGNOSIS — Z98.890 STATUS POST LUMBAR LAMINECTOMY: Primary | ICD-10-CM

## 2017-01-27 RX ORDER — OXYCODONE HYDROCHLORIDE 5 MG/1
5-10 TABLET ORAL EVERY 4 HOURS PRN
Qty: 45 TABLET | Refills: 0 | Status: ON HOLD | OUTPATIENT
Start: 2017-01-27 | End: 2017-01-31

## 2017-01-27 NOTE — TELEPHONE ENCOUNTER
Patient also requesting oxycodone refill and would like to  in [place] today. Forwarded to provider for approval.

## 2017-01-27 NOTE — TELEPHONE ENCOUNTER
Getting report faxed to our clinic, MRI in pacs. Will forward message to Dr. Toth to contact patient with results.

## 2017-01-30 ENCOUNTER — MYC MEDICAL ADVICE (OUTPATIENT)
Dept: NEUROSURGERY | Facility: CLINIC | Age: 37
End: 2017-01-30

## 2017-01-31 ENCOUNTER — ANESTHESIA (OUTPATIENT)
Dept: SURGERY | Facility: CLINIC | Age: 37
End: 2017-01-31
Payer: MEDICAID

## 2017-01-31 ENCOUNTER — HOSPITAL ENCOUNTER (EMERGENCY)
Facility: CLINIC | Age: 37
Discharge: HOME | End: 2017-01-31
Attending: EMERGENCY MEDICINE | Admitting: NEUROLOGICAL SURGERY
Payer: MEDICAID

## 2017-01-31 ENCOUNTER — ANESTHESIA EVENT (OUTPATIENT)
Dept: SURGERY | Facility: CLINIC | Age: 37
End: 2017-01-31
Payer: MEDICAID

## 2017-01-31 VITALS
HEART RATE: 84 BPM | RESPIRATION RATE: 28 BRPM | WEIGHT: 156 LBS | TEMPERATURE: 97.5 F | HEIGHT: 67 IN | SYSTOLIC BLOOD PRESSURE: 108 MMHG | OXYGEN SATURATION: 100 % | BODY MASS INDEX: 24.48 KG/M2 | DIASTOLIC BLOOD PRESSURE: 79 MMHG

## 2017-01-31 DIAGNOSIS — R29.898 LEFT LEG WEAKNESS: ICD-10-CM

## 2017-01-31 DIAGNOSIS — Z40.9 ENCOUNTER FOR PROPHYLACTIC SURGERY: Primary | ICD-10-CM

## 2017-01-31 DIAGNOSIS — G97.61 POSTPROCEDURAL HEMATOMA OF A NERVOUS SYSTEM ORGAN OR STRUCTURE FOLLOWING A NERVOUS SYSTEM PROCEDURE: ICD-10-CM

## 2017-01-31 DIAGNOSIS — T81.9XXA POSTOPERATIVE OR SURGICAL COMPLICATION, INITIAL ENCOUNTER: ICD-10-CM

## 2017-01-31 DIAGNOSIS — M54.42 ACUTE LEFT-SIDED LOW BACK PAIN WITH LEFT-SIDED SCIATICA: ICD-10-CM

## 2017-01-31 DIAGNOSIS — M51.26 HERNIATION OF INTERVERTEBRAL DISC BETWEEN L4 AND L5: ICD-10-CM

## 2017-01-31 LAB
ABO + RH BLD: NORMAL
ABO + RH BLD: NORMAL
ANION GAP SERPL CALCULATED.3IONS-SCNC: 7 MMOL/L (ref 3–14)
BASOPHILS # BLD AUTO: 0 10E9/L (ref 0–0.2)
BASOPHILS NFR BLD AUTO: 0.3 %
BLD GP AB SCN SERPL QL: NORMAL
BLOOD BANK CMNT PATIENT-IMP: NORMAL
BUN SERPL-MCNC: 11 MG/DL (ref 7–30)
CALCIUM SERPL-MCNC: 8.8 MG/DL (ref 8.5–10.1)
CHLORIDE SERPL-SCNC: 104 MMOL/L (ref 94–109)
CO2 SERPL-SCNC: 28 MMOL/L (ref 20–32)
CREAT SERPL-MCNC: 0.87 MG/DL (ref 0.66–1.25)
DIFFERENTIAL METHOD BLD: ABNORMAL
EOSINOPHIL # BLD AUTO: 0.1 10E9/L (ref 0–0.7)
EOSINOPHIL NFR BLD AUTO: 1.8 %
ERYTHROCYTE [DISTWIDTH] IN BLOOD BY AUTOMATED COUNT: 13.2 % (ref 10–15)
GFR SERPL CREATININE-BSD FRML MDRD: NORMAL ML/MIN/1.7M2
GLUCOSE SERPL-MCNC: 89 MG/DL (ref 70–99)
GRAM STN SPEC: NORMAL
GRAM STN SPEC: NORMAL
HCT VFR BLD AUTO: 41 % (ref 40–53)
HGB BLD-MCNC: 14.5 G/DL (ref 13.3–17.7)
IMM GRANULOCYTES # BLD: 0 10E9/L (ref 0–0.4)
IMM GRANULOCYTES NFR BLD: 0.3 %
INR PPP: 0.94 (ref 0.86–1.14)
LYMPHOCYTES # BLD AUTO: 1.4 10E9/L (ref 0.8–5.3)
LYMPHOCYTES NFR BLD AUTO: 19.4 %
MCH RBC QN AUTO: 30.4 PG (ref 26.5–33)
MCHC RBC AUTO-ENTMCNC: 35.4 G/DL (ref 31.5–36.5)
MCV RBC AUTO: 86 FL (ref 78–100)
MICRO REPORT STATUS: NORMAL
MICRO REPORT STATUS: NORMAL
MONOCYTES # BLD AUTO: 0.9 10E9/L (ref 0–1.3)
MONOCYTES NFR BLD AUTO: 13 %
NEUTROPHILS # BLD AUTO: 4.6 10E9/L (ref 1.6–8.3)
NEUTROPHILS NFR BLD AUTO: 65.2 %
NRBC # BLD AUTO: 0 10*3/UL
NRBC BLD AUTO-RTO: 0 /100
PLATELET # BLD AUTO: 132 10E9/L (ref 150–450)
POTASSIUM SERPL-SCNC: 4 MMOL/L (ref 3.4–5.3)
RBC # BLD AUTO: 4.77 10E12/L (ref 4.4–5.9)
SODIUM SERPL-SCNC: 139 MMOL/L (ref 133–144)
SPECIMEN EXP DATE BLD: NORMAL
SPECIMEN SOURCE: NORMAL
SPECIMEN SOURCE: NORMAL
WBC # BLD AUTO: 7.1 10E9/L (ref 4–11)

## 2017-01-31 PROCEDURE — 87075 CULTR BACTERIA EXCEPT BLOOD: CPT | Performed by: NEUROLOGICAL SURGERY

## 2017-01-31 PROCEDURE — 37000008 ZZH ANESTHESIA TECHNICAL FEE, 1ST 30 MIN: Performed by: NEUROLOGICAL SURGERY

## 2017-01-31 PROCEDURE — 87205 SMEAR GRAM STAIN: CPT | Mod: 91 | Performed by: NEUROLOGICAL SURGERY

## 2017-01-31 PROCEDURE — 36000056 ZZH SURGERY LEVEL 3 1ST 30 MIN: Performed by: NEUROLOGICAL SURGERY

## 2017-01-31 PROCEDURE — 27210794 ZZH OR GENERAL SUPPLY STERILE: Performed by: NEUROLOGICAL SURGERY

## 2017-01-31 PROCEDURE — 80048 BASIC METABOLIC PNL TOTAL CA: CPT | Performed by: EMERGENCY MEDICINE

## 2017-01-31 PROCEDURE — 85025 COMPLETE CBC W/AUTO DIFF WBC: CPT | Performed by: EMERGENCY MEDICINE

## 2017-01-31 PROCEDURE — 25000566 ZZH SEVOFLURANE, EA 15 MIN: Performed by: NEUROLOGICAL SURGERY

## 2017-01-31 PROCEDURE — 71000012 ZZH RECOVERY PHASE 1 LEVEL 1 FIRST HR: Performed by: NEUROLOGICAL SURGERY

## 2017-01-31 PROCEDURE — 25000128 H RX IP 250 OP 636: Performed by: ANESTHESIOLOGY

## 2017-01-31 PROCEDURE — 86901 BLOOD TYPING SEROLOGIC RH(D): CPT | Performed by: EMERGENCY MEDICINE

## 2017-01-31 PROCEDURE — 87070 CULTURE OTHR SPECIMN AEROBIC: CPT | Performed by: NEUROLOGICAL SURGERY

## 2017-01-31 PROCEDURE — 85610 PROTHROMBIN TIME: CPT | Performed by: EMERGENCY MEDICINE

## 2017-01-31 PROCEDURE — 36000058 ZZH SURGERY LEVEL 3 EA 15 ADDTL MIN: Performed by: NEUROLOGICAL SURGERY

## 2017-01-31 PROCEDURE — 25800025 ZZH RX 258: Performed by: SURGERY

## 2017-01-31 PROCEDURE — 25000301 ZZH OR RX SURGIFLO W/THROMBIN KIT 2ML 1991 OPNP: Performed by: NEUROLOGICAL SURGERY

## 2017-01-31 PROCEDURE — 40000170 ZZH STATISTIC PRE-PROCEDURE ASSESSMENT II: Performed by: NEUROLOGICAL SURGERY

## 2017-01-31 PROCEDURE — 86850 RBC ANTIBODY SCREEN: CPT | Performed by: EMERGENCY MEDICINE

## 2017-01-31 PROCEDURE — 25000125 ZZHC RX 250: Performed by: NEUROLOGICAL SURGERY

## 2017-01-31 PROCEDURE — 86900 BLOOD TYPING SEROLOGIC ABO: CPT | Performed by: EMERGENCY MEDICINE

## 2017-01-31 PROCEDURE — 22015 I&D ABSCESS P-SPINE L/S/LS: CPT | Mod: 78 | Performed by: NEUROLOGICAL SURGERY

## 2017-01-31 PROCEDURE — 25000125 ZZHC RX 250: Performed by: NURSE ANESTHETIST, CERTIFIED REGISTERED

## 2017-01-31 PROCEDURE — 25000128 H RX IP 250 OP 636: Performed by: NURSE ANESTHETIST, CERTIFIED REGISTERED

## 2017-01-31 PROCEDURE — 25000128 H RX IP 250 OP 636: Performed by: NEUROLOGICAL SURGERY

## 2017-01-31 PROCEDURE — 22015 I&D ABSCESS P-SPINE L/S/LS: CPT | Mod: AS | Performed by: PHYSICIAN ASSISTANT

## 2017-01-31 PROCEDURE — 71000013 ZZH RECOVERY PHASE 1 LEVEL 1 EA ADDTL HR: Performed by: NEUROLOGICAL SURGERY

## 2017-01-31 PROCEDURE — 37000009 ZZH ANESTHESIA TECHNICAL FEE, EACH ADDTL 15 MIN: Performed by: NEUROLOGICAL SURGERY

## 2017-01-31 PROCEDURE — 99285 EMERGENCY DEPT VISIT HI MDM: CPT

## 2017-01-31 PROCEDURE — 25000125 ZZHC RX 250: Performed by: ANESTHESIOLOGY

## 2017-01-31 PROCEDURE — 25000132 ZZH RX MED GY IP 250 OP 250 PS 637: Performed by: NEUROLOGICAL SURGERY

## 2017-01-31 RX ORDER — PROCHLORPERAZINE MALEATE 5 MG
5-10 TABLET ORAL EVERY 6 HOURS PRN
Status: CANCELLED | OUTPATIENT
Start: 2017-01-31

## 2017-01-31 RX ORDER — CEFAZOLIN SODIUM 1 G/3ML
1 INJECTION, POWDER, FOR SOLUTION INTRAMUSCULAR; INTRAVENOUS SEE ADMIN INSTRUCTIONS
Status: DISCONTINUED | OUTPATIENT
Start: 2017-01-31 | End: 2017-01-31 | Stop reason: HOSPADM

## 2017-01-31 RX ORDER — ONDANSETRON 4 MG/1
4 TABLET, ORALLY DISINTEGRATING ORAL EVERY 6 HOURS PRN
Status: CANCELLED | OUTPATIENT
Start: 2017-01-31

## 2017-01-31 RX ORDER — NEOSTIGMINE METHYLSULFATE 1 MG/ML
VIAL (ML) INJECTION PRN
Status: DISCONTINUED | OUTPATIENT
Start: 2017-01-31 | End: 2017-01-31

## 2017-01-31 RX ORDER — HYDROXYZINE HYDROCHLORIDE 25 MG/1
25 TABLET, FILM COATED ORAL EVERY 6 HOURS PRN
Status: CANCELLED | OUTPATIENT
Start: 2017-01-31

## 2017-01-31 RX ORDER — SODIUM CHLORIDE 9 MG/ML
INJECTION, SOLUTION INTRAVENOUS CONTINUOUS
Status: CANCELLED | OUTPATIENT
Start: 2017-01-31

## 2017-01-31 RX ORDER — SODIUM CHLORIDE, SODIUM LACTATE, POTASSIUM CHLORIDE, CALCIUM CHLORIDE 600; 310; 30; 20 MG/100ML; MG/100ML; MG/100ML; MG/100ML
INJECTION, SOLUTION INTRAVENOUS CONTINUOUS
Status: DISCONTINUED | OUTPATIENT
Start: 2017-01-31 | End: 2017-01-31 | Stop reason: HOSPADM

## 2017-01-31 RX ORDER — LIDOCAINE HYDROCHLORIDE 20 MG/ML
INJECTION, SOLUTION INFILTRATION; PERINEURAL PRN
Status: DISCONTINUED | OUTPATIENT
Start: 2017-01-31 | End: 2017-01-31

## 2017-01-31 RX ORDER — ONDANSETRON 2 MG/ML
4 INJECTION INTRAMUSCULAR; INTRAVENOUS EVERY 30 MIN PRN
Status: DISCONTINUED | OUTPATIENT
Start: 2017-01-31 | End: 2017-01-31 | Stop reason: HOSPADM

## 2017-01-31 RX ORDER — METOCLOPRAMIDE 10 MG/1
10 TABLET ORAL EVERY 6 HOURS PRN
Status: CANCELLED | OUTPATIENT
Start: 2017-01-31

## 2017-01-31 RX ORDER — PROPOFOL 10 MG/ML
INJECTION, EMULSION INTRAVENOUS PRN
Status: DISCONTINUED | OUTPATIENT
Start: 2017-01-31 | End: 2017-01-31

## 2017-01-31 RX ORDER — CEFAZOLIN SODIUM 2 G/100ML
2 INJECTION, SOLUTION INTRAVENOUS
Status: COMPLETED | OUTPATIENT
Start: 2017-01-31 | End: 2017-01-31

## 2017-01-31 RX ORDER — KETOROLAC TROMETHAMINE 30 MG/ML
30 INJECTION, SOLUTION INTRAMUSCULAR; INTRAVENOUS ONCE
Status: COMPLETED | OUTPATIENT
Start: 2017-01-31 | End: 2017-01-31

## 2017-01-31 RX ORDER — OXYCODONE HYDROCHLORIDE 5 MG/1
5-10 TABLET ORAL
Status: CANCELLED | OUTPATIENT
Start: 2017-01-31

## 2017-01-31 RX ORDER — FENTANYL CITRATE 0.05 MG/ML
25-50 INJECTION, SOLUTION INTRAMUSCULAR; INTRAVENOUS
Status: DISCONTINUED | OUTPATIENT
Start: 2017-01-31 | End: 2017-01-31 | Stop reason: HOSPADM

## 2017-01-31 RX ORDER — CEPHALEXIN 500 MG/1
500 CAPSULE ORAL 2 TIMES DAILY
Qty: 10 CAPSULE | Refills: 0 | Status: SHIPPED | OUTPATIENT
Start: 2017-01-31 | End: 2017-02-05

## 2017-01-31 RX ORDER — ONDANSETRON 2 MG/ML
4 INJECTION INTRAMUSCULAR; INTRAVENOUS EVERY 6 HOURS PRN
Status: CANCELLED | OUTPATIENT
Start: 2017-01-31

## 2017-01-31 RX ORDER — OXYCODONE HYDROCHLORIDE 5 MG/1
5-10 TABLET ORAL
Qty: 60 TABLET | Refills: 0 | Status: SHIPPED | OUTPATIENT
Start: 2017-01-31 | End: 2017-02-08

## 2017-01-31 RX ORDER — ONDANSETRON 4 MG/1
4 TABLET, ORALLY DISINTEGRATING ORAL EVERY 30 MIN PRN
Status: DISCONTINUED | OUTPATIENT
Start: 2017-01-31 | End: 2017-01-31 | Stop reason: HOSPADM

## 2017-01-31 RX ORDER — NALOXONE HYDROCHLORIDE 0.4 MG/ML
.1-.4 INJECTION, SOLUTION INTRAMUSCULAR; INTRAVENOUS; SUBCUTANEOUS
Status: CANCELLED | OUTPATIENT
Start: 2017-01-31

## 2017-01-31 RX ORDER — LABETALOL HYDROCHLORIDE 5 MG/ML
10 INJECTION, SOLUTION INTRAVENOUS
Status: DISCONTINUED | OUTPATIENT
Start: 2017-01-31 | End: 2017-01-31 | Stop reason: HOSPADM

## 2017-01-31 RX ORDER — OXYCODONE HYDROCHLORIDE 5 MG/1
5 TABLET ORAL ONCE
Status: COMPLETED | OUTPATIENT
Start: 2017-01-31 | End: 2017-01-31

## 2017-01-31 RX ORDER — FENTANYL CITRATE 50 UG/ML
INJECTION, SOLUTION INTRAMUSCULAR; INTRAVENOUS PRN
Status: DISCONTINUED | OUTPATIENT
Start: 2017-01-31 | End: 2017-01-31

## 2017-01-31 RX ORDER — METOCLOPRAMIDE HYDROCHLORIDE 5 MG/ML
10 INJECTION INTRAMUSCULAR; INTRAVENOUS EVERY 6 HOURS PRN
Status: CANCELLED | OUTPATIENT
Start: 2017-01-31

## 2017-01-31 RX ORDER — ONDANSETRON 2 MG/ML
INJECTION INTRAMUSCULAR; INTRAVENOUS PRN
Status: DISCONTINUED | OUTPATIENT
Start: 2017-01-31 | End: 2017-01-31

## 2017-01-31 RX ORDER — GLYCOPYRROLATE 0.2 MG/ML
INJECTION, SOLUTION INTRAMUSCULAR; INTRAVENOUS PRN
Status: DISCONTINUED | OUTPATIENT
Start: 2017-01-31 | End: 2017-01-31

## 2017-01-31 RX ORDER — CALCIUM CARBONATE 500 MG/1
500-1000 TABLET, CHEWABLE ORAL 4 TIMES DAILY PRN
Status: CANCELLED | OUTPATIENT
Start: 2017-01-31

## 2017-01-31 RX ADMIN — GLYCOPYRROLATE 0.5 MG: 0.2 INJECTION, SOLUTION INTRAMUSCULAR; INTRAVENOUS at 17:20

## 2017-01-31 RX ADMIN — FENTANYL CITRATE 50 MCG: 50 INJECTION, SOLUTION INTRAMUSCULAR; INTRAVENOUS at 16:54

## 2017-01-31 RX ADMIN — PHENYLEPHRINE HYDROCHLORIDE 50 MCG: 10 INJECTION, SOLUTION INTRAMUSCULAR; INTRAVENOUS; SUBCUTANEOUS at 17:01

## 2017-01-31 RX ADMIN — LIDOCAINE HYDROCHLORIDE 60 MG: 20 INJECTION, SOLUTION INFILTRATION; PERINEURAL at 16:26

## 2017-01-31 RX ADMIN — FENTANYL CITRATE 50 MCG: 50 INJECTION, SOLUTION INTRAMUSCULAR; INTRAVENOUS at 17:28

## 2017-01-31 RX ADMIN — NEOSTIGMINE METHYLSULFATE 3.5 MG: 1 INJECTION INTRAMUSCULAR; INTRAVENOUS; SUBCUTANEOUS at 17:20

## 2017-01-31 RX ADMIN — MIDAZOLAM HYDROCHLORIDE 2 MG: 1 INJECTION, SOLUTION INTRAMUSCULAR; INTRAVENOUS at 16:21

## 2017-01-31 RX ADMIN — SODIUM CHLORIDE, POTASSIUM CHLORIDE, SODIUM LACTATE AND CALCIUM CHLORIDE: 600; 310; 30; 20 INJECTION, SOLUTION INTRAVENOUS at 16:20

## 2017-01-31 RX ADMIN — PROPOFOL 200 MG: 10 INJECTION, EMULSION INTRAVENOUS at 16:26

## 2017-01-31 RX ADMIN — FENTANYL CITRATE 50 MCG: 50 INJECTION, SOLUTION INTRAMUSCULAR; INTRAVENOUS at 17:47

## 2017-01-31 RX ADMIN — FENTANYL CITRATE 150 MCG: 50 INJECTION, SOLUTION INTRAMUSCULAR; INTRAVENOUS at 16:26

## 2017-01-31 RX ADMIN — FENTANYL CITRATE 50 MCG: 50 INJECTION, SOLUTION INTRAMUSCULAR; INTRAVENOUS at 17:52

## 2017-01-31 RX ADMIN — PHENYLEPHRINE HYDROCHLORIDE 50 MCG: 10 INJECTION, SOLUTION INTRAMUSCULAR; INTRAVENOUS; SUBCUTANEOUS at 16:51

## 2017-01-31 RX ADMIN — HYDROMORPHONE HYDROCHLORIDE 0.5 MG: 1 INJECTION, SOLUTION INTRAMUSCULAR; INTRAVENOUS; SUBCUTANEOUS at 18:28

## 2017-01-31 RX ADMIN — OXYCODONE HYDROCHLORIDE 5 MG: 5 TABLET ORAL at 18:19

## 2017-01-31 RX ADMIN — CEFAZOLIN SODIUM 2 G: 2 INJECTION, SOLUTION INTRAVENOUS at 16:30

## 2017-01-31 RX ADMIN — HYDROMORPHONE HYDROCHLORIDE 0.5 MG: 1 INJECTION, SOLUTION INTRAMUSCULAR; INTRAVENOUS; SUBCUTANEOUS at 17:58

## 2017-01-31 RX ADMIN — ROCURONIUM BROMIDE 40 MG: 10 INJECTION INTRAVENOUS at 16:26

## 2017-01-31 RX ADMIN — KETOROLAC TROMETHAMINE 30 MG: 30 INJECTION, SOLUTION INTRAMUSCULAR at 18:20

## 2017-01-31 RX ADMIN — ONDANSETRON 4 MG: 2 INJECTION INTRAMUSCULAR; INTRAVENOUS at 17:04

## 2017-01-31 ASSESSMENT — LIFESTYLE VARIABLES: TOBACCO_USE: 1

## 2017-01-31 ASSESSMENT — ENCOUNTER SYMPTOMS
BACK PAIN: 1
MYALGIAS: 1
SEIZURES: 0
VOMITING: 0
NUMBNESS: 1
WEAKNESS: 1
CHILLS: 0
FEVER: 0
ROS GI COMMENTS: NEGATIVE FOR BOWEL INCONTINENCE.

## 2017-01-31 ASSESSMENT — COPD QUESTIONNAIRES: COPD: 0

## 2017-01-31 NOTE — IP AVS SNAPSHOT
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        Pain level: 0-10 Pain Scale: 8    Is this patient confused?: No    Patient Report: Initial Complaint: pt had back surgery recently and developed hematoma of surgical site which was drained recently and is nowback and is having weakness of left leg    Focused Assessment: weakness of left leg developed injury on the job  Tests Performed: labs see epic  Abnormal Results: see epic  Treatments provided: IV placed and labs drawn      Family Comments: friend at bedside    OBS brochure/video discussed/provided to patient: N/A    ED Medications: Medications - No data to display    Drips infusing?:  No      ED NURSE PHONE NUMBER: [phone]                    Procedure Notes     No notes of this type exist for this encounter.      Progress Notes - Therapies " (Notes from 01/28/17 through 01/31/17)     No notes of this type exist for this encounter.

## 2017-01-31 NOTE — IP AVS SNAPSHOT
[facility] PreOP/Phase II    [address]    [place] [address]    Phone:  [phone]                                       After Visit Summary   1/31/2017    Ko Bell    MRN: [id number]           After Visit Summary Signature Page     I have received my discharge instructions, and my questions have been answered. I have discussed any challenges I see with this plan with the nurse or doctor.    ..........................................................................................................................................  Patient/Patient Representative Signature      ..........................................................................................................................................  Patient Representative Print Name and Relationship to Patient    ..................................................               ................................................  Date                                            Time    ..........................................................................................................................................  Reviewed by Signature/Title    ...................................................              ..............................................  Date                                                            Time
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
## 2017-01-31 NOTE — ED PROVIDER NOTES
History     Chief Complaint:  Post-op Problem      HPI   Ko Bell is a 36 year old male who presents to the emergency department today with his significant other for evaluation of a post-op problem. The patient underwent a microdiscectomy of the L4-L5 region on 1/3/17 by Dr. Toth and had subsequently developed a fluid collection at the incision site which was incised and drained a bloody fluid on 1/20/17 at [facility] ED. The patient states that he felt fine after the drainage, but found that the hematoma had reappeared after taking the bandage off. The patient then followed up with Dr. Toth in clinic in 1/23 where he was told that the fluid was not infectious. Since then, the patient states that the hematoma has increased in size and hardened. Yesterday, the patient notes that he was unable to walk and found his motor skills to be worse than what they were prior to the surgery. The patient describes the pain as a stabbing pain radiating down the left leg. The patient endorses a tingling sensation in the left buttock and left foot. The patient adds that he consulted with Dr. Toth's office, who stated that he should come into the ED, undergo MRI imaging and plan to be operated on today by Dr. Toth. The patient denies fever, chills, vomiting and urinary or bowel incontinence.       Allergies:  No known drug allergies       Medications:     Oxycodone  Keflex  Seroquel       Past Medical History:     Bipolar disorder  Lumbar radiculopathy  Herniation of intervertebral disc  Impetigo      Past Surgical History:     Sinus surgery x2  Discectomy Lumbar     Family History:     Cancer- Brother  Alcoholism- Father  Depression- Mother     Social History:  The patient presents with his significant other.   Smoking status: Current everyday smoker  Alcohol use: No   Marital Status:        Review of Systems   Constitutional: Negative for fever and chills.   Gastrointestinal: Negative for vomiting.         "Negative for bowel incontinence.   Genitourinary: Negative for enuresis.   Musculoskeletal: Positive for myalgias (left lower extremity) and back pain.   Skin:        Positive for hematoma.   Neurological: Positive for weakness and numbness (left leg and foot).        Positive for decreased motor function.   All other systems reviewed and are negative.    Physical Exam   Vitals:  Patient Vitals for the past 24 hrs:   BP Temp Temp src Pulse Resp SpO2 Height Weight   01/31/17 1400 138/78 mmHg - - 84 18 97 % - -   01/31/17 1208 (!) 140/93 mmHg 98  F (36.7  C) Oral 97 15 97 % 1.702 m (5' 7\") 70.761 kg (156 lb)      Physical Exam  General: Patient is alert and normal appearing.  HEENT: Head atraumatic    Eyes: pupils equal and reactive. Conjunctiva clear   Nares: patent   Oropharynx: no lesions, uvula midline, no palatal draping, normal voice, no trismus  Neck: Supple without lymphadenopathy, no meningismus  Chest: Heart regular rate and rhythm.   Lungs: Equal clear to auscultation with no wheeze or rales  Abdomen: Soft, non tender, nondistended, normal bowel sounds  Back: No costovertebral angle tenderness, no midline C, T or L spine tenderness  Neuro: Grossly nonfocal, normal speech, strength 5/5 RLE, 4+/5 on LLE, equal reflexes bilaterally, no saddle anesthesia, CN 2-12 intact  Extremities: No deformities, equal radial and DP pulses. No clubbing, cyanosis.  No edema  Skin: Warm and dry with no rash.      Emergency Department Course     Laboratory:  Laboratory findings were communicated with the patient who voiced understanding of the findings.    CBC:  (L) o/w WNL. (WBC 7.1, HGB 14.5)  INR: 0.94  BMP: AWNL (Creatinine 0.87)      Emergency Department Course:  Nursing notes and vitals reviewed.  I performed an exam of the patient as documented above.   1231: I spoke with Letty Vincent PA-C, of the Neurosurgery service from [facility] regarding patient's presentation, findings, and plan of " care.  IV was inserted and blood was drawn for laboratory testing, results above.   1330: I spoke with Dr. Toth of the Neurosurgery service from [facility] regarding patient's presentation, findings, and plan of care. Dr. Toth recommends cancelling the MRI, as he does not need it.   The patient was rechecked and was updated on the results of his laboratory and imaging studies.   I personally reviewed the laboratory results with the Patient and answered all related questions prior to switching over to the care of Dr. Toth.    Impression & Plan      Medical Decision Making:  Ko Bell is a 36 year old male who is approximately 1 month out from microdiscectomy who has had a recurrence of soft tissue hematoma at his surgical site. There is no evidence to suggest infection. No erythema, warmth or tenderness. WBC is normal. Electrolytes are normal. Last nigh he had increased shooting pains down his legs with numbness of his left foot and weakness of his left leg similar to his symptoms prior to his surgery. He is neurologically intact at this time. I discussed the patient with the neurosurgery PA who initially recommended MRI but then Dr. Toth, the patient's surgeon, came to the see the patient in the ED and stated that we could discontinue the MRI and that he was going to take him to wash the area out and further evaluate what was causing the recurrence. I do not think that the patient needs antibiotics at this time as there is no evidence to suggest acute infection. He is agreeable with the management plan and all questions and concerns were addressed.       Diagnosis:    ICD-10-CM    1. Postoperative or surgical complication, initial encounter T81.9XXA    2. Postprocedural hematoma of a nervous system organ or structure following a nervous system procedure G97.61    3. Acute left-sided low back pain with left-sided sciatica M54.42    4. Left leg weakness M62.81          Disposition:   The patient  is handed over to the care of Dr. Toth.       Scribe Disclosure:  I, Trinity Christiano, am serving as a scribe at 12:31 PM on 1/31/2017 to document services personally performed by Paty Laura MD, based on my observations and the provider's statements to me.      1/31/2017    EMERGENCY DEPARTMENT        Paty Laura MD  01/31/17 9832

## 2017-01-31 NOTE — ANESTHESIA CARE TRANSFER NOTE
Patient: Ko Bell    IRRIGATION AND DEBRIDEMENT SPINE (N/A Spine)  Additional InformationProcedure(s):  LUMBAR WOUND INCISION AND DRAINAGE - Wound Class: I-Clean    Diagnosis: POST-OP HEMATOMA/WOUND  Diagnosis Additional Information: No value filed.    Anesthesia Type:   General, ETT     Note:  Airway :Face Mask  Patient transferred to:PACU  Comments: Pt awake and able to verbalize needs. ALL monitors on and audible. Vital signs stable. Spontaneous respiration without difficulty. o2 sats 97% on 10Lo2 per face mask. Pt denies pain. Report given to PACU RN.      Vitals: (Last set prior to Anesthesia Care Transfer)              Electronically Signed By: MARTHA Linda CRNA  January 31, 2017  5:33 PM
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
## 2017-01-31 NOTE — PHARMACY-ADMISSION MEDICATION HISTORY
Admission medication history interview status for the 1/31/2017  admission is complete. See EPIC admission navigator for prior to admission medications     Medication history source reliability:Good    Actions taken by pharmacist (provider contacted, etc):None     Additional medication history information not noted on PTA med list :None    Medication reconciliation/reorder completed by provider prior to medication history? No    Time spent in this activity: 10 minutes    Prior to Admission medications    Medication Sig Last Dose Taking? Auth Provider   oxyCODONE (ROXICODONE) 5 MG IR tablet Take 1-2 tablets (5-10 mg) by mouth every 4 hours as needed for pain 1/31/2017 at 0730 Yes Letty Vincent, APRN CNP   QUEtiapine Fumarate (SEROQUEL PO) Take 100 mg by mouth nightly as needed  More than a month at Unknown time  Reported, Patient       Karrie Cardozo, PharmD, BCPS

## 2017-01-31 NOTE — IP AVS SNAPSHOT
MRN:[id number]                      After Visit Summary   1/31/2017    Ko Bell    MRN: [id number]           Thank you!     Thank you for choosing [facility] for your care. Our goal is always to provide you with excellent care. Hearing back from our patients is one way we can continue to improve our services. Please take a few minutes to complete the written survey that you may receive in the mail after you visit with us. Thank you!        Patient Information     Date Of Birth          1980        About your hospital stay     You were admitted on:  N/A You last received care in the:  [facility] PreOP/Phase II    You were discharged on:  January 31, 2017       Who to Call     For medical emergencies, please call 911.  For non-urgent questions about your medical care, please call your primary care provider or clinic, [phone]  For questions related to your surgery, please call your surgery clinic        Attending Provider     Provider    Paty Laura MD       Primary Care Provider Office Phone #    [facility] [phone]       No address on file        After Care Instructions     Diet Instructions       Resume pre-procedure diet            Discharge Instructions       Patient to follow up with appointment in 6 weeks with Dr. Toth    Discharge instructions:  No lifting of more than 10 pounds until follow up visit.  Ok to shampoo hair, shower or bathe, but, do not scrub or submerge incision under water until evaluated post operatively in clinic.    Ok to walk as tolerated, avoid bedrest.    No contact sports until after follow up visit  No high impact activities such as; running/jogging, snowmobile or 4 jade riding or any other recreational vehicles    Call my office at [phone] for increasing redness, swelling or pus draining from the incision, increased pain or any other questions and concerns.    Go to ER with any seizure activity, mental status  change (increasing confusion), difficulty with speech or increasing or acute weakness            Ice to affected area       Ice to operative site PRN            No lifting        No lifting over 10 lbs and no strenuous physical activity for 4 weeks            Shower       No shower for 24 hours post procedure. May shower Postoperative Day (POD)  1                  Your next 10 appointments already scheduled     Feb 13, 2017  9:00 AM   Return Visit with Adrian Toth MD   [facility] ([facility])    [address] [place] [id number] [phone]              Further instructions from your care team       Same Day Surgery Discharge Instructions for  Sedation and General Anesthesia       It's not unusual to feel dizzy, light-headed or faint for up to 24 hours after surgery or while taking pain medication.  If you have these symptoms: sit for a few minutes before standing and have someone assist you when you get up to walk or use the bathroom.      You should rest and relax for the next 24 hours. We recommend you make arrangements to have an adult stay with you for at least 24 hours after your discharge.  Avoid hazardous and strenuous activity.      DO NOT DRIVE any vehicle or operate mechanical equipment for 24 hours following the end of your surgery.  Even though you may feel normal, your reactions may be affected by the medication you have received.      Do not drink alcoholic beverages for 24 hours following surgery.       Slowly progress to your regular diet as you feel able. It's not unusual to feel nauseated and/or vomit after receiving anesthesia.  If you develop these symptoms, drink clear liquids (apple juice, ginger ale, broth, 7-up, etc. ) until you feel better.  If your nausea and vomiting persists for 24 hours, please notify your surgeon.        All narcotic pain medications, along with inactivity and anesthesia, can cause  constipation. Drinking plenty of liquids and increasing fiber intake will help.      For any questions of a medical nature, call your surgeon.      Do not make important decisions for 24 hours.      If you had general anesthesia, you may have a sore throat for a couple of days related to the breathing tube used during surgery.  You may use Cepacol lozenges to help with this discomfort.  If it worsens or if you develop a fever, contact your surgeon.       If you feel your pain is not well managed with the pain medications prescribed by your surgeon, please contact your surgeon's office to let them know so they can address your concerns.       Discharge Instructions for Back Surgery  Dr. JACIEL Toth    -Post Operative incisional pain often lasts 1-2 weeks which will require pain medications and muscle relaxants.  You will receive medication upon discharge.    - Do NOT drive while taking narcotic pain medication.  - No NSAIDs (Ibuprofen, Advil, Motrin, Aleve, Naproxen) for 7 days    Post-Operative Incision Care  - Watch for signs of infection:  redness, swelling, warmth, drainage, and fever 101 degrees or higher.  Notify Dr. Toth s office of any signs of infection.    - No submerging incision in water such as pools, hot tubs, and baths for at least 8 weeks or until incision is healed.  Showers are fine.    Post-Operative Activity Limitations  - For 6-8 weeks:  No lifting greater than 10 pounds, no bending, twisting or overhead reaching.    -If you are currently employed, you will need to be off work for 2-4 weeks for post-op recovery and healing.    Follow Up Appointment  Neurosurgery appointment with Dr. Toth or Kirt Madera PA-C at the clinic in 6 weeks.  Call [phone] for appointment.  While you were at the hospital today you received Toradol, an antiinflammatory medication similar to Ibuprofen.  You should not take other antiinflammatory medication, such as Ibuprofen, Motrin, Advil, Aleve, Naprosyn, etc, until  "12:00am.     Pending Results     Date and Time Order Name Status Description    1/31/2017 1657 Wound Culture Aerobic Bacterial In process     1/31/2017 1657 Wound Culture Aerobic Bacterial In process     1/31/2017 1657 Gram stain In process     1/31/2017 1657 Gram stain In process     1/31/2017 1657 Anaerobic bacterial culture In process     1/31/2017 1657 Anaerobic bacterial culture In process             Admission Information        Department Dept Phone    1/31/2017  Preop/Phase [phone]      Your Vitals Were     Blood Pressure Pulse Temperature    131/90 mmHg 84 97.5  F (36.4  C) (Temporal)    Respirations Height Weight    16 1.702 m (5' 7\") 70.761 kg (156 lb)    BMI (Body Mass Index) Pulse Oximetry       24.43 kg/m2 100%       MyChart Information     [employer] gives you secure access to your electronic health record. If you see a primary care provider, you can also send messages to your care team and make appointments. If you have questions, please call your primary care clinic.  If you do not have a primary care provider, please call [phone] and they will assist you.        Care EveryWhere ID     This is your Care EveryWhere ID. 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 If we are uncertain of the size of tablets/capsules you have at home, strength may be listed as something that might have changed.        Dose / Directions    oxyCODONE 5 MG IR tablet   Commonly known as:  ROXICODONE   This may have changed:    - when to take this  - reasons to take " this   Used for:  Postoperative or surgical complication, initial encounter   Notes to Patient:  You took one pill at 6:19        Dose:  5-10 mg   Take 1-2 tablets (5-10 mg) by mouth every 3 hours as needed for pain or other (Moderate to Severe)   Quantity:  60 tablet   Refills:  0         CONTINUE these medicines which have NOT CHANGED        Dose / Directions    SEROQUEL PO        Dose:  100 mg   Take 100 mg by mouth nightly as needed   Refills:  0            Where to get your medicines      Some of these will need a paper prescription and others can be bought over the counter. Ask your nurse if you have questions.     Bring a paper prescription for each of these medications    - cephALEXin 500 MG capsule  - oxyCODONE 5 MG IR tablet             Protect others around you: Learn how to safely use, store and throw away your medicines at www.[employer]orgemymeds.org.             Medication List: This is a list of all your medications and when to take them. Check marks below indicate your daily home schedule. Keep this list as a reference.      Medications           Morning Afternoon Evening Bedtime As Needed    cephALEXin 500 MG capsule   Commonly known as:  KEFLEX   Take 1 capsule (500 mg) by mouth 2 times daily for 5 days                                oxyCODONE 5 MG IR tablet   Commonly known as:  ROXICODONE   Take 1-2 tablets (5-10 mg) by mouth every 3 hours as needed for pain or other (Moderate to Severe)   Last time this was given:  5 mg on 1/31/2017  6:19 PM   Notes to Patient:  You took one pill at 6:19                                SEROQUEL PO   Take 100 mg by mouth nightly as needed

## 2017-01-31 NOTE — ED NOTES
"[facility]  ED Nurse Handoff Report    ED Chief complaint: Post-op Problem      ED Diagnosis:   Final diagnoses:   Postoperative or surgical complication, initial encounter   Postprocedural hematoma of a nervous system organ or structure following a nervous system procedure   Acute left-sided low back pain with left-sided sciatica   Left leg weakness       Code Status: Full Code    Allergies: No Known Allergies    Activity level:  Stand with Assist     Needed?: No    Isolation: No  Infection: Not Applicable    Bariatric?: No      Vital Signs:   Filed Vitals:    01/31/17 1208   BP: 140/93   Pulse: 97   Temp: 98  F (36.7  C)   TempSrc: Oral   Resp: 15   Height: 1.702 m (5' 7\")   Weight: 70.761 kg (156 lb)   SpO2: 97%       Cardiac Rhythm: ,        Pain level: 0-10 Pain Scale: 8    Is this patient confused?: No    Patient Report: Initial Complaint: pt had back surgery recently and developed hematoma of surgical site which was drained recently and is nowback and is having weakness of left leg    Focused Assessment: weakness of left leg developed injury on the job  Tests Performed: labs see epic  Abnormal Results: see epic  Treatments provided: IV placed and labs drawn      Family Comments: friend at bedside    OBS brochure/video discussed/provided to patient: N/A    ED Medications: Medications - No data to display    Drips infusing?:  No      ED NURSE PHONE NUMBER: [phone]         "

## 2017-02-01 NOTE — OP NOTE
DATE OF PROCEDURE:  01/31/2017       SURGEON:  Adrian Toth MD      ASSISTANT:  Kirt Madera PA-C     Note:  Kirt Madera was present for and assisted with the entire surgery, and his role as an assistant was crucial for his aid in positioning, exposure, suctioning, retraction and closure.      PREOPERATIVE DIAGNOSIS:  Lumbar wound hematoma.      POSTOPERATIVE DIAGNOSIS:  Lumbar wound hematoma.      PROCEDURE:  Lumbar wound incision and drainage.      ESTIMATED BLOOD LOSS:  10 mL.      INDICATIONS:  Mr. Ko Bell is a 36-year-old gentleman who presents 1 month status post left L4-5 microdiskectomy.  He had excellent response in terms of his preoperative leg pain.  At 2 weeks postop, he resumed normal activities, including a significant exercise and some weightlifting.  Subsequently, he developed a significant fluid collection underneath the incision.  MRI was obtained that showed that this was most consistent with hematoma or seroma.  This was aspirated in the emergency room and cultures were negative; however, it continued to grow, and he had progressive back pain and muscle spasms.  Risks, benefits, indications and alternatives were discussed with the patient and his family in detail all their questions were answered, and they wished to proceed with surgery.      DESCRIPTION OF PROCEDURE:  The patient was positioned prone.  Sterile prepping and draping procedure was performed.  Antibiotics were administered and timeout was performed.  The prior left-sided lumbar incision was opened with a #10 blade.  A significant amount of serous fluid under high pressure came out of the wound.  The remainder of the fluid was suctioned out easily.  A thin pseudo film was noted around this.  There was no evidence of purulence and no concern for infection.  Cultures were taken.  The fascia was noted to be closed completely.  Monopolar was used to reopen the fascia, and the muscle appeared completely normal beneath it.  There  was no fluid below the fascia.  Antibiotic irrigation was performed.  The pseudocapsule was debrided.  Hemostasis was achieved.  The fascia was closed with 0 Vicryl sutures.  The dermal layer was closed with 2-0 Vicryl sutures and the skin was closed with staples.  There were no intraprocedural complications.         MARY TAYLOR MD             D: 2017 17:28   T: 2017 22:20   MT: [id number]      Name:     TATIANA STARK   MRN:      [id number]        Account:        [id number]   :      1980           Procedure Date: 2017      Document: [id number]

## 2017-02-01 NOTE — ANESTHESIA POSTPROCEDURE EVALUATION
Patient: Ko Bell    IRRIGATION AND DEBRIDEMENT SPINE (N/A Spine)  Additional InformationProcedure(s):  LUMBAR WOUND INCISION AND DRAINAGE - Wound Class: I-Clean    Diagnosis:POST-OP HEMATOMA/WOUND  Diagnosis Additional Information: No value filed.    Anesthesia Type:  General, ETT    Note:  Anesthesia Post Evaluation    Patient location during evaluation: PACU  Patient participation: Able to fully participate in evaluation  Level of consciousness: awake and alert  Pain management: satisfactory to patient  Airway patency: patent  Cardiovascular status: hemodynamically unstable  Respiratory status: acceptable and unassisted  Hydration status: balanced  PONV: none     Anesthetic complications: None          Last vitals:  Filed Vitals:    01/31/17 1208 01/31/17 1400 01/31/17 1727   BP: 140/93 138/78 131/90   Pulse: 97 84    Temp: 36.7  C (98  F)  36.4  C (97.5  F)   Resp: 15 18 16   SpO2: 97% 97% 100%       Electronically Signed By: Lexx Whitney MD  January 31, 2017  6:08 PM

## 2017-02-01 NOTE — DISCHARGE INSTRUCTIONS
Same Day Surgery Discharge Instructions for  Sedation and General Anesthesia       It's not unusual to feel dizzy, light-headed or faint for up to 24 hours after surgery or while taking pain medication.  If you have these symptoms: sit for a few minutes before standing and have someone assist you when you get up to walk or use the bathroom.      You should rest and relax for the next 24 hours. We recommend you make arrangements to have an adult stay with you for at least 24 hours after your discharge.  Avoid hazardous and strenuous activity.      DO NOT DRIVE any vehicle or operate mechanical equipment for 24 hours following the end of your surgery.  Even though you may feel normal, your reactions may be affected by the medication you have received.      Do not drink alcoholic beverages for 24 hours following surgery.       Slowly progress to your regular diet as you feel able. It's not unusual to feel nauseated and/or vomit after receiving anesthesia.  If you develop these symptoms, drink clear liquids (apple juice, ginger ale, broth, 7-up, etc. ) until you feel better.  If your nausea and vomiting persists for 24 hours, please notify your surgeon.        All narcotic pain medications, along with inactivity and anesthesia, can cause constipation. Drinking plenty of liquids and increasing fiber intake will help.      For any questions of a medical nature, call your surgeon.      Do not make important decisions for 24 hours.      If you had general anesthesia, you may have a sore throat for a couple of days related to the breathing tube used during surgery.  You may use Cepacol lozenges to help with this discomfort.  If it worsens or if you develop a fever, contact your surgeon.       If you feel your pain is not well managed with the pain medications prescribed by your surgeon, please contact your surgeon's office to let them know so they can address your concerns.       Discharge Instructions for Back Surgery    JACIEL Toth    -Post Operative incisional pain often lasts 1-2 weeks which will require pain medications and muscle relaxants.  You will receive medication upon discharge.    - Do NOT drive while taking narcotic pain medication.  - No NSAIDs (Ibuprofen, Advil, Motrin, Aleve, Naproxen) for 7 days    Post-Operative Incision Care  - Watch for signs of infection:  redness, swelling, warmth, drainage, and fever 101 degrees or higher.  Notify Dr. Toth s office of any signs of infection.    - No submerging incision in water such as pools, hot tubs, and baths for at least 8 weeks or until incision is healed.  Showers are fine.    Post-Operative Activity Limitations  - For 6-8 weeks:  No lifting greater than 10 pounds, no bending, twisting or overhead reaching.    -If you are currently employed, you will need to be off work for 2-4 weeks for post-op recovery and healing.    Follow Up Appointment  Neurosurgery appointment with Dr. Toth or Kirt Madera PA-C at the clinic in 6 weeks.  Call [phone] for appointment.  While you were at the hospital today you received Toradol, an antiinflammatory medication similar to Ibuprofen.  You should not take other antiinflammatory medication, such as Ibuprofen, Motrin, Advil, Aleve, Naprosyn, etc, until 12:00am.

## 2017-02-02 ENCOUNTER — DOCUMENTATION ONLY (OUTPATIENT)
Dept: NEUROSURGERY | Facility: CLINIC | Age: 37
End: 2017-02-02

## 2017-02-02 ENCOUNTER — MYC MEDICAL ADVICE (OUTPATIENT)
Dept: NEUROSURGERY | Facility: CLINIC | Age: 37
End: 2017-02-02

## 2017-02-02 ENCOUNTER — TELEPHONE (OUTPATIENT)
Dept: NEUROSURGERY | Facility: CLINIC | Age: 37
End: 2017-02-02

## 2017-02-02 DIAGNOSIS — Z98.890 S/P LUMBAR MICRODISCECTOMY: Primary | ICD-10-CM

## 2017-02-02 LAB
BACTERIA SPEC CULT: NO GROWTH
BACTERIA SPEC CULT: NO GROWTH
MICRO REPORT STATUS: NORMAL
MICRO REPORT STATUS: NORMAL
SPECIMEN SOURCE: NORMAL
SPECIMEN SOURCE: NORMAL

## 2017-02-02 NOTE — TELEPHONE ENCOUNTER
Called and spoke to patient to inform him Per Dr. Toth, cultures are negative. Recommend to finish oral antibiotics. Patient verbalized understanding.

## 2017-02-02 NOTE — PATIENT INSTRUCTIONS
Per Dr. Toth, contacted patient to inform him the cultures are negative. Recommend to finish oral antibiotics. No answer, LVM, advised to call back to discuss.

## 2017-02-03 RX ORDER — CYCLOBENZAPRINE HCL 10 MG
10 TABLET ORAL 3 TIMES DAILY PRN
Qty: 90 TABLET | Refills: 1 | Status: SHIPPED | OUTPATIENT
Start: 2017-02-03 | End: 2017-05-17

## 2017-02-03 NOTE — TELEPHONE ENCOUNTER
Patient requested muscle relaxer. Approved by Letty Vincent CNP to order flexeril. Patient notified it was escribed to [facility] in [place].

## 2017-02-07 ENCOUNTER — TELEPHONE (OUTPATIENT)
Dept: NEUROSURGERY | Facility: CLINIC | Age: 37
End: 2017-02-07

## 2017-02-07 LAB
BACTERIA SPEC CULT: NORMAL
BACTERIA SPEC CULT: NORMAL
Lab: NORMAL
Lab: NORMAL
MICRO REPORT STATUS: NORMAL
MICRO REPORT STATUS: NORMAL
SPECIMEN SOURCE: NORMAL
SPECIMEN SOURCE: NORMAL

## 2017-02-07 NOTE — TELEPHONE ENCOUNTER
Reva called to say that Ko's incision is swelling, and it very painful again. They are wondering what he needs to do. Also he needs another refill on his pain medication.

## 2017-02-07 NOTE — TELEPHONE ENCOUNTER
Patient did not show for his appointment today. Dr. Toth recommends patient continue to monitor incision site, and to contact clinic if symptoms persist or worsen. Patient notified.

## 2017-02-07 NOTE — TELEPHONE ENCOUNTER
Spoke with Letty Vincent CNP and Dr. Toth, who recommend patient be seen today at [facility]. Made appointment for 1:30pm. Contacted patient and his spouse, Reva, but no answer. Left voicemail message.

## 2017-02-07 NOTE — TELEPHONE ENCOUNTER
Contacted patient and both emergency contact numbers multiple times to inform patient that Dr. Toth would like to see patient today in [place] at [facility]. No answer from any numbers, left voicemail messages.

## 2017-02-08 ENCOUNTER — DOCUMENTATION ONLY (OUTPATIENT)
Dept: NEUROSURGERY | Facility: CLINIC | Age: 37
End: 2017-02-08

## 2017-02-08 DIAGNOSIS — T81.9XXA POSTOPERATIVE OR SURGICAL COMPLICATION, INITIAL ENCOUNTER: Primary | ICD-10-CM

## 2017-02-08 RX ORDER — OXYCODONE HYDROCHLORIDE 5 MG/1
5-10 TABLET ORAL EVERY 4 HOURS PRN
Qty: 60 TABLET | Refills: 0 | Status: SHIPPED | OUTPATIENT
Start: 2017-02-08 | End: 2017-02-13

## 2017-02-08 NOTE — PROGRESS NOTES
Patient requesting oxycodone refill and would like to  in [place]. Krista Rain CNP approved. Patient notified.

## 2017-02-13 ENCOUNTER — OFFICE VISIT (OUTPATIENT)
Dept: NEUROSURGERY | Facility: CLINIC | Age: 37
End: 2017-02-13
Attending: NEUROLOGICAL SURGERY
Payer: COMMERCIAL

## 2017-02-13 VITALS
WEIGHT: 156 LBS | OXYGEN SATURATION: 99 % | HEART RATE: 104 BPM | BODY MASS INDEX: 24.43 KG/M2 | DIASTOLIC BLOOD PRESSURE: 88 MMHG | SYSTOLIC BLOOD PRESSURE: 141 MMHG | TEMPERATURE: 97.7 F

## 2017-02-13 DIAGNOSIS — T81.9XXA POSTOPERATIVE OR SURGICAL COMPLICATION, INITIAL ENCOUNTER: ICD-10-CM

## 2017-02-13 DIAGNOSIS — Z98.890 STATUS POST LUMBAR MICRODISCECTOMY: Primary | ICD-10-CM

## 2017-02-13 PROCEDURE — 40000269 ZZH STATISTIC NO CHARGE FACILITY FEE

## 2017-02-13 PROCEDURE — 99024 POSTOP FOLLOW-UP VISIT: CPT | Performed by: NEUROLOGICAL SURGERY

## 2017-02-13 RX ORDER — OXYCODONE HYDROCHLORIDE 5 MG/1
5-10 TABLET ORAL EVERY 4 HOURS PRN
Qty: 60 TABLET | Refills: 0 | Status: SHIPPED | OUTPATIENT
Start: 2017-02-13 | End: 2017-02-22

## 2017-02-13 NOTE — PROGRESS NOTES
Presents two weeks postop status post lumbar wound revision, after previous microdiscectomy.  Cultures were negative, and surgery had revealed a noninfected superficial seroma.  He does have some recurrence of swelling in the superficial tissues today.  Incision is well-healed without drainage.  He has had no fevers or erythema.  We discussed that he may be having an allergic reaction to the Vicryl suture.  At this point we will manage him conservatively, with the goal of getting this swelling to settle down.  We discussed that if he has worsening swelling or pain, we could consider a clinic needle aspiration.

## 2017-02-13 NOTE — NURSING NOTE
"Ko Bell is a 36 year old male who presents for:  Chief Complaint   Patient presents with     Neurologic Problem     s/p lumbar discectomy 1-3-17 with I & D 1-31-17        Initial Vitals:  There were no vitals taken for this visit. Estimated body mass index is 24.43 kg/(m^2) as calculated from the following:    Height as of 1/31/17: 5' 7\" (1.702 m).    Weight as of 1/31/17: 156 lb (70.8 kg).. There is no height or weight on file to calculate BSA. BP completed using cuff size: regular  Data Unavailable    Do you feel safe in your environment?  Yes  Do you need any refills today? Yes    Nursing Comments: s/p lumbar discectomy 1-3-17 with I & D 1-31-17.  Patient rates his pain today as 6 swelling and pain. Leg numbness improving      5 min. nursing intake time  Janki Mejia CMA, AAS      Discharge plan:   See providers dictation  2 min. nursing discharge time  Janki Mejia CMA, AAS       "

## 2017-02-13 NOTE — MR AVS SNAPSHOT
After Visit Summary   2/13/2017    Ko Bell    MRN: [id number]           Patient Information     Date Of Birth          1980        Visit Information        Provider Department      2/13/2017 9:00 AM Adrian Toth MD [facility] Neurosurgery [facility]        Today's Diagnoses     Status post lumbar microdiscectomy    -  1       Follow-ups after your visit        Who to contact     If you have questions or need follow up information about today's clinic visit or your schedule please contact [facility] NEUROSURGERY [facility] directly at [phone].  Normal or non-critical lab and imaging results will be communicated to you by [employer], letter or phone within 4 business days after the clinic has received the results. If you do not hear from us within 7 days, please contact the clinic through PPSt or phone. If you have a critical or abnormal lab result, we will notify you by phone as soon as possible.  Submit refill requests through [employer] or call your pharmacy and they will forward the refill request to us. Please allow 3 business days for your refill to be completed.          Additional Information About Your Visit        MyChart Information     [employer] gives you secure access to your electronic health record. If you see a primary care provider, you can also send messages to your care team and make appointments. If you have questions, please call your primary care clinic.  If you do not have a primary care provider, please call [phone] and they will assist you.        Care EveryWhere ID     This is your Care EveryWhere ID. This could be used by other organizations to access your [place] medical records  [id number]        Your Vitals Were     Pulse Temperature Pulse Oximetry BMI (Body Mass Index)          104 97.7  F (36.5  C) (Oral) 99% 24.43 kg/m2         Blood Pressure from Last 3 Encounters:   02/13/17 141/88   01/31/17 108/79   01/23/17 120/78    Weight from  Last 3 Encounters:   02/13/17 70.8 kg (156 lb)   01/31/17 70.8 kg (156 lb)   01/23/17 70.8 kg (156 lb)              Today, you had the following     No orders found for display       Primary Care Provider Office Phone #    Shimon Barrientos [facility] [phone]       No address on file        Thank you!     Thank you for choosing [facility]  for your care. Our goal is always to provide you with excellent care. Hearing back from our patients is one way we can continue to improve our services. Please take a few minutes to complete the written survey that you may receive in the mail after your visit with us. Thank you!             Your Updated Medication List - Protect others around you: Learn how to safely use, store and throw away your medicines at www.disposemymeds.org.          This list is accurate as of: 2/13/17  9:26 AM.  Always use your most recent med list.                   Brand Name Dispense Instructions for use    cyclobenzaprine 10 MG tablet    FLEXERIL    90 tablet    Take 1 tablet (10 mg) by mouth 3 times daily as needed for muscle spasms       oxyCODONE 5 MG IR tablet    ROXICODONE    60 tablet    Take 1-2 tablets (5-10 mg) by mouth every 4 hours as needed for pain or other (Moderate to Severe)       SEROQUEL PO      Take 100 mg by mouth nightly as needed

## 2017-02-22 ENCOUNTER — MYC MEDICAL ADVICE (OUTPATIENT)
Dept: NEUROSURGERY | Facility: CLINIC | Age: 37
End: 2017-02-22

## 2017-02-22 DIAGNOSIS — T81.9XXA POSTOPERATIVE OR SURGICAL COMPLICATION, INITIAL ENCOUNTER: ICD-10-CM

## 2017-02-22 RX ORDER — OXYCODONE HYDROCHLORIDE 5 MG/1
5-10 TABLET ORAL EVERY 4 HOURS PRN
Qty: 60 TABLET | Refills: 0 | Status: SHIPPED | OUTPATIENT
Start: 2017-02-22 | End: 2017-03-07

## 2017-02-22 NOTE — TELEPHONE ENCOUNTER
Received message from patient via [employer]. Called patient to get more information. He reports the swollen area at his incision is hard again, and would like to proceed with the aspiration as he discussed with Dr. Toth during his visit on 2/13/17. Spoke with Dr. Toth, who advises patient to schedule appt with him for Monday 2/27/17. Appt set up for 9:20am. Krista Rain CNP also approved oxycodone refill. Mailed to [facility] in [place].

## 2017-03-06 ENCOUNTER — MYC MEDICAL ADVICE (OUTPATIENT)
Dept: NEUROSURGERY | Facility: CLINIC | Age: 37
End: 2017-03-06

## 2017-03-07 ENCOUNTER — TELEPHONE (OUTPATIENT)
Dept: NEUROSURGERY | Facility: CLINIC | Age: 37
End: 2017-03-07

## 2017-03-07 DIAGNOSIS — T81.9XXA POSTOPERATIVE OR SURGICAL COMPLICATION, INITIAL ENCOUNTER: ICD-10-CM

## 2017-03-07 RX ORDER — OXYCODONE HYDROCHLORIDE 5 MG/1
5 TABLET ORAL EVERY 4 HOURS PRN
Qty: 60 TABLET | Refills: 0 | Status: SHIPPED | OUTPATIENT
Start: 2017-03-07 | End: 2017-03-14

## 2017-03-07 NOTE — TELEPHONE ENCOUNTER
Jenny Allen PA-C refilled oxycodone. LVM notifying patient the script is available for pickup in [place] [facility].

## 2017-03-07 NOTE — TELEPHONE ENCOUNTER
Returned phone call to Reva. Patient had missed his appointment on 2/27/17 for the aspiration. He's still having a lot of pain in that area, and would like to reschedule. Appt set for 10:00 am on 3/13/17 with Dr. Toth. Patient also requesting oxycodone refill. He takes 1-2 tabs every 4 hours. Able to  in [place] when approved. Will forward to a provider for recommendation.

## 2017-03-07 NOTE — TELEPHONE ENCOUNTER
Reva called to let us know that brittany is having continued back pain, and would like to know if she should bring him in for an appt or not.

## 2017-03-13 ENCOUNTER — OFFICE VISIT (OUTPATIENT)
Dept: NEUROSURGERY | Facility: CLINIC | Age: 37
End: 2017-03-13
Attending: NEUROLOGICAL SURGERY
Payer: COMMERCIAL

## 2017-03-13 ENCOUNTER — TELEPHONE (OUTPATIENT)
Dept: NEUROSURGERY | Facility: CLINIC | Age: 37
End: 2017-03-13

## 2017-03-13 DIAGNOSIS — G97.63 POSTOPERATIVE SEROMA INVOLVING NERVOUS SYSTEM AFTER NERVOUS SYSTEM PROCEDURE: Primary | ICD-10-CM

## 2017-03-13 PROCEDURE — 10140 I&D HMTMA SEROMA/FLUID COLLJ: CPT | Mod: 58 | Performed by: NEUROLOGICAL SURGERY

## 2017-03-13 PROCEDURE — 40000269 ZZH STATISTIC NO CHARGE FACILITY FEE

## 2017-03-13 NOTE — TELEPHONE ENCOUNTER
KYARA....    Reva just wants us to know that Ko needs to get his rx refilled and note from pertaining to work restrictions.  Don't need to call unless questions

## 2017-03-13 NOTE — NURSING NOTE
"Ko Bell is a 37 year old male who presents for:  Chief Complaint   Patient presents with     Neurologic Problem     s/p lumbar discectomy 1-3-17, I & D 1-31-17, \"aspiration\" superfucial seroma        Initial Vitals:  There were no vitals taken for this visit. Estimated body mass index is 24.43 kg/(m^2) as calculated from the following:    Height as of 1/31/17: 5' 7\" (1.702 m).    Weight as of 2/13/17: 156 lb (70.8 kg).. There is no height or weight on file to calculate BSA. BP completed using cuff size: regular  Data Unavailable    Do you feel safe in your environment?  Yes  Do you need any refills today? No    Nursing Comments: s/p lumbar discectomy 1-3-17, I & D 1-31-17, \"aspiration\" superfucial seroma.  Patient rates his pain today as 0 on medication      5 min. nursing intake time  Janki Mejia CMA, AAS      Discharge plan:   See providers dictation   2 min. nursing discharge time  Janki Mejia CMA, AAS       "

## 2017-03-13 NOTE — PROGRESS NOTES
Presents one month postop status post revision for superficial seroma.  He has developed a interval recurrence of the seroma, which is very surprising given that I completely removed the capsule during surgery.  We discussed that I'm concerned that this could be an allergic reaction to the Vicryl sutures.  No fevers, chills, or wound drainage.    Today, under sterile conditions, I utilized an 18-gauge spinal needle and aspirated the seroma.  Several cc's of serosanguineous fluid was aspirated.  The wound was dressed with a pressure dressing subsequently.    We discussed that if there is further recurrence of the seroma, I will refer him to a plastic surgeon, given that this is a recurrent superficial wound process.

## 2017-03-13 NOTE — MR AVS SNAPSHOT
After Visit Summary   3/13/2017    Ko Bell    MRN: [id number]           Patient Information     Date Of Birth          1980        Visit Information        Provider Department      3/13/2017 2:20 PM Adrian Toth MD [facility] Neurosurgery [facility]        Today's Diagnoses     Postoperative seroma involving nervous system after nervous system procedure    -  1       Follow-ups after your visit        Who to contact     If you have questions or need follow up information about today's clinic visit or your schedule please contact [facility] NEUROSURGERY [facility] directly at [phone].  Normal or non-critical lab and imaging results will be communicated to you by [employer], letter or phone within 4 business days after the clinic has received the results. If you do not hear from us within 7 days, please contact the clinic through CSM[employer] or phone. If you have a critical or abnormal lab result, we will notify you by phone as soon as possible.  Submit refill requests through [employer] or call your pharmacy and they will forward the refill request to us. Please allow 3 business days for your refill to be completed.          Additional Information About Your Visit        MyChart Information     [employer] gives you secure access to your electronic health record. If you see a primary care provider, you can also send messages to your care team and make appointments. If you have questions, please call your primary care clinic.  If you do not have a primary care provider, please call [phone] and they will assist you.        Care EveryWhere ID     This is your Care EveryWhere ID. This could be used by other organizations to access your [place] medical records  [id number]         Blood Pressure from Last 3 Encounters:   02/13/17 141/88   01/31/17 108/79   01/23/17 120/78    Weight from Last 3 Encounters:   02/13/17 70.8 kg (156 lb)   01/31/17 70.8 kg (156 lb)   01/23/17 70.8 kg  (156 lb)              Today, you had the following     No orders found for display       Primary Care Provider Office Phone #    Shimon [facility] [phone]       No address on file        Thank you!     Thank you for choosing [facility]  for your care. Our goal is always to provide you with excellent care. Hearing back from our patients is one way we can continue to improve our services. Please take a few minutes to complete the written survey that you may receive in the mail after your visit with us. Thank you!             Your Updated Medication List - Protect others around you: Learn how to safely use, store and throw away your medicines at www.disposemymeds.org.          This list is accurate as of: 3/13/17  4:04 PM.  Always use your most recent med list.                   Brand Name Dispense Instructions for use    cyclobenzaprine 10 MG tablet    FLEXERIL    90 tablet    Take 1 tablet (10 mg) by mouth 3 times daily as needed for muscle spasms       oxyCODONE 5 MG IR tablet    ROXICODONE    60 tablet    Take 1 tablet (5 mg) by mouth every 4 hours as needed for pain or other (Moderate to Severe)       SEROQUEL PO      Take 100 mg by mouth nightly as needed

## 2017-03-14 ENCOUNTER — TELEPHONE (OUTPATIENT)
Dept: NEUROSURGERY | Facility: CLINIC | Age: 37
End: 2017-03-14

## 2017-03-14 ENCOUNTER — MYC MEDICAL ADVICE (OUTPATIENT)
Dept: NEUROSURGERY | Facility: CLINIC | Age: 37
End: 2017-03-14

## 2017-03-14 DIAGNOSIS — Z98.890 S/P LUMBAR MICRODISCECTOMY: ICD-10-CM

## 2017-03-14 DIAGNOSIS — T81.9XXA POSTOPERATIVE OR SURGICAL COMPLICATION, INITIAL ENCOUNTER: ICD-10-CM

## 2017-03-14 DIAGNOSIS — G97.63 POSTOPERATIVE SEROMA INVOLVING NERVOUS SYSTEM AFTER NERVOUS SYSTEM PROCEDURE: Primary | ICD-10-CM

## 2017-03-14 RX ORDER — OXYCODONE HYDROCHLORIDE 5 MG/1
5 TABLET ORAL EVERY 4 HOURS PRN
Qty: 60 TABLET | Refills: 0 | Status: SHIPPED | OUTPATIENT
Start: 2017-03-14 | End: 2017-03-14

## 2017-03-14 RX ORDER — OXYCODONE HYDROCHLORIDE 5 MG/1
5 TABLET ORAL EVERY 4 HOURS PRN
Qty: 60 TABLET | Refills: 0 | Status: SHIPPED | OUTPATIENT
Start: 2017-03-14 | End: 2017-03-22

## 2017-03-14 NOTE — LETTER
[facility]   [facility]  [address] [place] [id number]    March 15th, 2017    To Whom it May Concern,      Ko Bell was seen at our clinic for a disc herniation, which was caused by a fall that occurred at work. He had surgery on 1/3/17 and is following up with our clinic for post-operative care.    Please call our clinic with questions or concerns.       Sincerely,            Adrian Toth MD  [facility]  [address] [address]    Tel [phone]

## 2017-03-14 NOTE — TELEPHONE ENCOUNTER
Requesting letter to  and to employer stating that the injury pt sustained was due to work injury and that he had no prior history of back pain  Requesting referral to cosmetic surgeon for bump on his back that won't go away.

## 2017-03-17 NOTE — TELEPHONE ENCOUNTER
Placed referral for plastic surgeon. Patient also requesting letter to  and employer, but he has more documentation he wants to send us first before writing it.

## 2017-03-22 ENCOUNTER — TELEPHONE (OUTPATIENT)
Dept: NEUROSURGERY | Facility: CLINIC | Age: 37
End: 2017-03-22

## 2017-03-22 DIAGNOSIS — T81.9XXA POSTOPERATIVE OR SURGICAL COMPLICATION, INITIAL ENCOUNTER: ICD-10-CM

## 2017-03-22 RX ORDER — OXYCODONE HYDROCHLORIDE 5 MG/1
5 TABLET ORAL EVERY 4 HOURS PRN
Qty: 60 TABLET | Refills: 0 | Status: SHIPPED | OUTPATIENT
Start: 2017-03-27 | End: 2017-04-04

## 2017-03-22 NOTE — TELEPHONE ENCOUNTER
Returned phone call to patient's wife, Reva. I informed her that Dr. Toth referred patient to see a plastic surgeon for his back. Provided Reva with their number for scheduling, and advised her to call back if there are any questions or concerns.    Also requesting refill of oxycodone. She would like to  in [place] this Friday 3/24/17 since the patient will be out on Eliu 3/26/17. Will communicate with Jenny Allen PA-C.

## 2017-03-22 NOTE — TELEPHONE ENCOUNTER
Jenny Allen PA-C refilled patient's oxycodone. Since the patient will be out on Eliu 3/26/17, Jenny approved a fill date of 3/27/17. Notified Reva the script can be picked up in [place].

## 2017-04-04 ENCOUNTER — DOCUMENTATION ONLY (OUTPATIENT)
Dept: NEUROSURGERY | Facility: CLINIC | Age: 37
End: 2017-04-04

## 2017-04-04 DIAGNOSIS — T81.9XXA POSTOPERATIVE OR SURGICAL COMPLICATION, INITIAL ENCOUNTER: ICD-10-CM

## 2017-04-04 RX ORDER — OXYCODONE HYDROCHLORIDE 5 MG/1
5 TABLET ORAL EVERY 4 HOURS PRN
Qty: 60 TABLET | Refills: 0 | Status: SHIPPED | OUTPATIENT
Start: 2017-04-04 | End: 2017-04-18

## 2017-04-04 NOTE — LETTER
[facility]   [facility]  [address] [place] [address], [place] [address]    April 4th, 2017    To Whom it May Concern,      Ko Bell has been under my care for post-operative management. He sustained an injury on 11/23/16 while working for [employer]. He fell approximately 12-36 inches from a tower. Ko had no pre-existing back issues and his pre-injury functional status was consistent with him not having had a back injury. The MRI from his initial appointment with me showed clear injury. Ko completed and failed non-operative therapy, and thus surgery was performed. On 1/3/17, he underwent a left lumbar four to lumbar five minimally invasive microdiscectomy. Ko has continued to be under my care since then for further management.     Please call my clinic with any further questions.      Sincerely,            Adrian Toth MD  [facility]  [address]  [place] [address]    Tel [phone]

## 2017-04-04 NOTE — PROGRESS NOTES
Oxycodone refilled, script mailed to [facility] in [place]. Patient aware this takes a few days to get to pharmacy via mail.

## 2017-04-11 ENCOUNTER — OFFICE VISIT (OUTPATIENT)
Dept: FAMILY MEDICINE | Facility: CLINIC | Age: 37
End: 2017-04-11
Payer: COMMERCIAL

## 2017-04-11 VITALS
SYSTOLIC BLOOD PRESSURE: 118 MMHG | OXYGEN SATURATION: 100 % | HEIGHT: 67 IN | WEIGHT: 161.38 LBS | DIASTOLIC BLOOD PRESSURE: 78 MMHG | TEMPERATURE: 97.2 F | BODY MASS INDEX: 25.33 KG/M2 | HEART RATE: 99 BPM

## 2017-04-11 DIAGNOSIS — Z01.818 PREOP GENERAL PHYSICAL EXAM: Primary | ICD-10-CM

## 2017-04-11 DIAGNOSIS — T14.8XXA HEMATOMA: ICD-10-CM

## 2017-04-11 DIAGNOSIS — F31.60 BIPOLAR AFFECTIVE DISORDER, CURRENT EPISODE MIXED, CURRENT EPISODE SEVERITY UNSPECIFIED (H): ICD-10-CM

## 2017-04-11 DIAGNOSIS — F17.200 TOBACCO USE DISORDER: ICD-10-CM

## 2017-04-11 DIAGNOSIS — M51.26 HERNIATION OF INTERVERTEBRAL DISC BETWEEN L4 AND L5: ICD-10-CM

## 2017-04-11 LAB
ALBUMIN SERPL-MCNC: 3.8 G/DL (ref 3.4–5)
ALP SERPL-CCNC: 102 U/L (ref 40–150)
ALT SERPL W P-5'-P-CCNC: 33 U/L (ref 0–70)
ANION GAP SERPL CALCULATED.3IONS-SCNC: 9 MMOL/L (ref 3–14)
AST SERPL W P-5'-P-CCNC: 19 U/L (ref 0–45)
BILIRUB SERPL-MCNC: 0.3 MG/DL (ref 0.2–1.3)
BUN SERPL-MCNC: 16 MG/DL (ref 7–30)
CALCIUM SERPL-MCNC: 9 MG/DL (ref 8.5–10.1)
CHLORIDE SERPL-SCNC: 105 MMOL/L (ref 94–109)
CO2 SERPL-SCNC: 26 MMOL/L (ref 20–32)
CREAT SERPL-MCNC: 0.9 MG/DL (ref 0.66–1.25)
ERYTHROCYTE [DISTWIDTH] IN BLOOD BY AUTOMATED COUNT: 13.1 % (ref 10–15)
GFR SERPL CREATININE-BSD FRML MDRD: NORMAL ML/MIN/1.7M2
GLUCOSE SERPL-MCNC: 96 MG/DL (ref 70–99)
HCT VFR BLD AUTO: 44.9 % (ref 40–53)
HGB BLD-MCNC: 15.5 G/DL (ref 13.3–17.7)
MCH RBC QN AUTO: 29.6 PG (ref 26.5–33)
MCHC RBC AUTO-ENTMCNC: 34.5 G/DL (ref 31.5–36.5)
MCV RBC AUTO: 86 FL (ref 78–100)
PLATELET # BLD AUTO: 154 10E9/L (ref 150–450)
POTASSIUM SERPL-SCNC: 3.9 MMOL/L (ref 3.4–5.3)
PROT SERPL-MCNC: 7.3 G/DL (ref 6.8–8.8)
RBC # BLD AUTO: 5.24 10E12/L (ref 4.4–5.9)
SODIUM SERPL-SCNC: 140 MMOL/L (ref 133–144)
WBC # BLD AUTO: 5.6 10E9/L (ref 4–11)

## 2017-04-11 PROCEDURE — 99214 OFFICE O/P EST MOD 30 MIN: CPT | Performed by: PHYSICIAN ASSISTANT

## 2017-04-11 PROCEDURE — 36415 COLL VENOUS BLD VENIPUNCTURE: CPT | Performed by: PHYSICIAN ASSISTANT

## 2017-04-11 PROCEDURE — 85027 COMPLETE CBC AUTOMATED: CPT | Performed by: PHYSICIAN ASSISTANT

## 2017-04-11 PROCEDURE — 80053 COMPREHEN METABOLIC PANEL: CPT | Performed by: PHYSICIAN ASSISTANT

## 2017-04-11 NOTE — PROGRESS NOTES
Amilcar  I have reviewed your recent labs. Here are the results:    -Liver and gallbladder tests are normal. (ALT,AST, Alk phos, bilirubin), kidney function is normal (Cr, GFR), Sodium is normal, Potassium is normal, Calcium is normal, Glucose is normal (diabetes screening test).   -Normal red blood cell (hgb) levels, normal white blood cell count and normal platelet levels.    Good luck with your surgery!     If you have any questions please do not hesitate to contact our office via phone ([phone]) or MyChart.    Bethany James, MS, PA-C  [facility]

## 2017-04-11 NOTE — NURSING NOTE
"Chief Complaint   Patient presents with     Pre-Op Exam       Initial /78  Pulse 99  Temp 97.2  F (36.2  C) (Tympanic)  Ht 5' 7\" (1.702 m)  Wt 161 lb 6 oz (73.2 kg)  SpO2 100%  BMI 25.27 kg/m2 Estimated body mass index is 25.27 kg/(m^2) as calculated from the following:    Height as of this encounter: 5' 7\" (1.702 m).    Weight as of this encounter: 161 lb 6 oz (73.2 kg).  Medication Reconciliation: complete   Liya Medina, GEOVANI      "
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
## 2017-04-18 ENCOUNTER — TELEPHONE (OUTPATIENT)
Dept: NEUROSURGERY | Facility: CLINIC | Age: 37
End: 2017-04-18

## 2017-04-18 ENCOUNTER — MYC MEDICAL ADVICE (OUTPATIENT)
Dept: FAMILY MEDICINE | Facility: CLINIC | Age: 37
End: 2017-04-18

## 2017-04-18 DIAGNOSIS — T81.9XXA POSTOPERATIVE OR SURGICAL COMPLICATION, INITIAL ENCOUNTER: ICD-10-CM

## 2017-04-18 RX ORDER — OXYCODONE HYDROCHLORIDE 5 MG/1
5 TABLET ORAL EVERY 4 HOURS PRN
Qty: 60 TABLET | Refills: 0 | Status: ON HOLD | OUTPATIENT
Start: 2017-04-18 | End: 2017-04-25

## 2017-04-18 NOTE — TELEPHONE ENCOUNTER
Controlled Substance Refill Request for   oxyCODONE (ROXICODONE) 5 MG IR tablet 60 tablet 0 4/4/2017  No   Sig: Take 1 tablet (5 mg) by mouth every 4 hours as needed for pain maximum 6 tablet(s) per day   Class: Local Print   Route: Oral   Order: [id number]       Problem List Complete:  No     PROVIDER TO CONSIDER COMPLETION OF PROBLEM LIST AND OVERVIEW/CONTROLLED SUBSTANCE AGREEMENT      Last Office Visit with [facility] primary care provider: 4/11/2017    Future Office visit:     Controlled substance agreement on file: No.     Processing:  Patient will  in clinic   checked in past 6 months?  No, route to RN     Barbara Cooper RN, BSN  [facility]

## 2017-04-18 NOTE — TELEPHONE ENCOUNTER
Patient requesting oxycodone refill, but PCP already refilled today. LVM with patient stating we will not provide additional script and he can use the one from PCP.
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
## 2017-04-25 ENCOUNTER — ANESTHESIA EVENT (OUTPATIENT)
Dept: SURGERY | Facility: CLINIC | Age: 37
End: 2017-04-25
Payer: COMMERCIAL

## 2017-04-25 ENCOUNTER — HOSPITAL ENCOUNTER (OUTPATIENT)
Facility: CLINIC | Age: 37
Discharge: HOME | End: 2017-04-25
Attending: PLASTIC SURGERY | Admitting: PLASTIC SURGERY
Payer: COMMERCIAL

## 2017-04-25 ENCOUNTER — ANESTHESIA (OUTPATIENT)
Dept: SURGERY | Facility: CLINIC | Age: 37
End: 2017-04-25
Payer: COMMERCIAL

## 2017-04-25 VITALS
SYSTOLIC BLOOD PRESSURE: 115 MMHG | RESPIRATION RATE: 14 BRPM | WEIGHT: 159.2 LBS | OXYGEN SATURATION: 98 % | BODY MASS INDEX: 24.99 KG/M2 | HEIGHT: 67 IN | TEMPERATURE: 97.1 F | DIASTOLIC BLOOD PRESSURE: 78 MMHG

## 2017-04-25 DIAGNOSIS — T81.9XXA POSTOPERATIVE OR SURGICAL COMPLICATION, INITIAL ENCOUNTER: ICD-10-CM

## 2017-04-25 PROCEDURE — 36000052 ZZH SURGERY LEVEL 2 EA 15 ADDTL MIN: Performed by: PLASTIC SURGERY

## 2017-04-25 PROCEDURE — 27210794 ZZH OR GENERAL SUPPLY STERILE: Performed by: PLASTIC SURGERY

## 2017-04-25 PROCEDURE — 25000128 H RX IP 250 OP 636: Performed by: NURSE ANESTHETIST, CERTIFIED REGISTERED

## 2017-04-25 PROCEDURE — 40000170 ZZH STATISTIC PRE-PROCEDURE ASSESSMENT II: Performed by: PLASTIC SURGERY

## 2017-04-25 PROCEDURE — 27210995 ZZH RX 272: Performed by: PLASTIC SURGERY

## 2017-04-25 PROCEDURE — 36000050 ZZH SURGERY LEVEL 2 1ST 30 MIN: Performed by: PLASTIC SURGERY

## 2017-04-25 PROCEDURE — 37000008 ZZH ANESTHESIA TECHNICAL FEE, 1ST 30 MIN: Performed by: PLASTIC SURGERY

## 2017-04-25 PROCEDURE — 25000125 ZZHC RX 250: Performed by: PLASTIC SURGERY

## 2017-04-25 PROCEDURE — 25000128 H RX IP 250 OP 636: Performed by: ANESTHESIOLOGY

## 2017-04-25 PROCEDURE — 25000128 H RX IP 250 OP 636: Performed by: PLASTIC SURGERY

## 2017-04-25 PROCEDURE — 25000132 ZZH RX MED GY IP 250 OP 250 PS 637: Performed by: PLASTIC SURGERY

## 2017-04-25 PROCEDURE — 71000027 ZZH RECOVERY PHASE 2 EACH 15 MINS: Performed by: PLASTIC SURGERY

## 2017-04-25 PROCEDURE — 71000013 ZZH RECOVERY PHASE 1 LEVEL 1 EA ADDTL HR: Performed by: PLASTIC SURGERY

## 2017-04-25 PROCEDURE — 37000009 ZZH ANESTHESIA TECHNICAL FEE, EACH ADDTL 15 MIN: Performed by: PLASTIC SURGERY

## 2017-04-25 PROCEDURE — 25800025 ZZH RX 258: Performed by: NURSE ANESTHETIST, CERTIFIED REGISTERED

## 2017-04-25 PROCEDURE — 71000012 ZZH RECOVERY PHASE 1 LEVEL 1 FIRST HR: Performed by: PLASTIC SURGERY

## 2017-04-25 PROCEDURE — 25000125 ZZHC RX 250: Performed by: NURSE ANESTHETIST, CERTIFIED REGISTERED

## 2017-04-25 RX ORDER — ONDANSETRON 2 MG/ML
INJECTION INTRAMUSCULAR; INTRAVENOUS PRN
Status: DISCONTINUED | OUTPATIENT
Start: 2017-04-25 | End: 2017-04-25

## 2017-04-25 RX ORDER — NALOXONE HYDROCHLORIDE 0.4 MG/ML
.1-.4 INJECTION, SOLUTION INTRAMUSCULAR; INTRAVENOUS; SUBCUTANEOUS
Status: DISCONTINUED | OUTPATIENT
Start: 2017-04-25 | End: 2017-04-25 | Stop reason: HOSPADM

## 2017-04-25 RX ORDER — MEPERIDINE HYDROCHLORIDE 25 MG/ML
12.5 INJECTION INTRAMUSCULAR; INTRAVENOUS; SUBCUTANEOUS
Status: DISCONTINUED | OUTPATIENT
Start: 2017-04-25 | End: 2017-04-25 | Stop reason: HOSPADM

## 2017-04-25 RX ORDER — FENTANYL CITRATE 50 UG/ML
25-50 INJECTION, SOLUTION INTRAMUSCULAR; INTRAVENOUS
Status: DISCONTINUED | OUTPATIENT
Start: 2017-04-25 | End: 2017-04-25 | Stop reason: HOSPADM

## 2017-04-25 RX ORDER — OXYCODONE HYDROCHLORIDE 5 MG/1
5 TABLET ORAL ONCE
Status: COMPLETED | OUTPATIENT
Start: 2017-04-25 | End: 2017-04-25

## 2017-04-25 RX ORDER — OXYCODONE HYDROCHLORIDE 5 MG/1
5 TABLET ORAL EVERY 4 HOURS PRN
Qty: 50 TABLET | Refills: 0 | Status: SHIPPED | OUTPATIENT
Start: 2017-04-25 | End: 2017-05-03

## 2017-04-25 RX ORDER — ONDANSETRON 2 MG/ML
4 INJECTION INTRAMUSCULAR; INTRAVENOUS EVERY 30 MIN PRN
Status: DISCONTINUED | OUTPATIENT
Start: 2017-04-25 | End: 2017-04-25 | Stop reason: HOSPADM

## 2017-04-25 RX ORDER — MAGNESIUM HYDROXIDE 1200 MG/15ML
LIQUID ORAL PRN
Status: DISCONTINUED | OUTPATIENT
Start: 2017-04-25 | End: 2017-04-25 | Stop reason: HOSPADM

## 2017-04-25 RX ORDER — PROPOFOL 10 MG/ML
INJECTION, EMULSION INTRAVENOUS PRN
Status: DISCONTINUED | OUTPATIENT
Start: 2017-04-25 | End: 2017-04-25

## 2017-04-25 RX ORDER — SODIUM CHLORIDE, SODIUM LACTATE, POTASSIUM CHLORIDE, CALCIUM CHLORIDE 600; 310; 30; 20 MG/100ML; MG/100ML; MG/100ML; MG/100ML
INJECTION, SOLUTION INTRAVENOUS CONTINUOUS PRN
Status: DISCONTINUED | OUTPATIENT
Start: 2017-04-25 | End: 2017-04-25

## 2017-04-25 RX ORDER — ONDANSETRON 4 MG/1
4 TABLET, ORALLY DISINTEGRATING ORAL EVERY 30 MIN PRN
Status: DISCONTINUED | OUTPATIENT
Start: 2017-04-25 | End: 2017-04-25 | Stop reason: HOSPADM

## 2017-04-25 RX ORDER — BUPIVACAINE HYDROCHLORIDE AND EPINEPHRINE 2.5; 5 MG/ML; UG/ML
INJECTION, SOLUTION INFILTRATION; PERINEURAL PRN
Status: DISCONTINUED | OUTPATIENT
Start: 2017-04-25 | End: 2017-04-25 | Stop reason: HOSPADM

## 2017-04-25 RX ORDER — DEXAMETHASONE SODIUM PHOSPHATE 4 MG/ML
INJECTION, SOLUTION INTRA-ARTICULAR; INTRALESIONAL; INTRAMUSCULAR; INTRAVENOUS; SOFT TISSUE PRN
Status: DISCONTINUED | OUTPATIENT
Start: 2017-04-25 | End: 2017-04-25

## 2017-04-25 RX ORDER — FENTANYL CITRATE 50 UG/ML
25-50 INJECTION, SOLUTION INTRAMUSCULAR; INTRAVENOUS EVERY 5 MIN PRN
Status: DISCONTINUED | OUTPATIENT
Start: 2017-04-25 | End: 2017-04-25 | Stop reason: HOSPADM

## 2017-04-25 RX ORDER — NEOSTIGMINE METHYLSULFATE 1 MG/ML
VIAL (ML) INJECTION PRN
Status: DISCONTINUED | OUTPATIENT
Start: 2017-04-25 | End: 2017-04-25

## 2017-04-25 RX ORDER — CEFAZOLIN SODIUM 1 G/3ML
1 INJECTION, POWDER, FOR SOLUTION INTRAMUSCULAR; INTRAVENOUS SEE ADMIN INSTRUCTIONS
Status: DISCONTINUED | OUTPATIENT
Start: 2017-04-25 | End: 2017-04-25 | Stop reason: HOSPADM

## 2017-04-25 RX ORDER — CEFAZOLIN SODIUM 2 G/100ML
2 INJECTION, SOLUTION INTRAVENOUS
Status: COMPLETED | OUTPATIENT
Start: 2017-04-25 | End: 2017-04-25

## 2017-04-25 RX ORDER — GLYCOPYRROLATE 0.2 MG/ML
INJECTION, SOLUTION INTRAMUSCULAR; INTRAVENOUS PRN
Status: DISCONTINUED | OUTPATIENT
Start: 2017-04-25 | End: 2017-04-25

## 2017-04-25 RX ORDER — FENTANYL CITRATE 50 UG/ML
INJECTION, SOLUTION INTRAMUSCULAR; INTRAVENOUS PRN
Status: DISCONTINUED | OUTPATIENT
Start: 2017-04-25 | End: 2017-04-25

## 2017-04-25 RX ORDER — PROPOFOL 10 MG/ML
INJECTION, EMULSION INTRAVENOUS CONTINUOUS PRN
Status: DISCONTINUED | OUTPATIENT
Start: 2017-04-25 | End: 2017-04-25

## 2017-04-25 RX ORDER — SODIUM CHLORIDE, SODIUM LACTATE, POTASSIUM CHLORIDE, CALCIUM CHLORIDE 600; 310; 30; 20 MG/100ML; MG/100ML; MG/100ML; MG/100ML
INJECTION, SOLUTION INTRAVENOUS CONTINUOUS
Status: DISCONTINUED | OUTPATIENT
Start: 2017-04-25 | End: 2017-04-25 | Stop reason: HOSPADM

## 2017-04-25 RX ORDER — LIDOCAINE HYDROCHLORIDE 20 MG/ML
INJECTION, SOLUTION INFILTRATION; PERINEURAL PRN
Status: DISCONTINUED | OUTPATIENT
Start: 2017-04-25 | End: 2017-04-25

## 2017-04-25 RX ORDER — BUPIVACAINE HYDROCHLORIDE AND EPINEPHRINE 2.5; 5 MG/ML; UG/ML
INJECTION, SOLUTION EPIDURAL; INFILTRATION; INTRACAUDAL; PERINEURAL
Status: DISCONTINUED
Start: 2017-04-25 | End: 2017-04-25 | Stop reason: HOSPADM

## 2017-04-25 RX ADMIN — LIDOCAINE HYDROCHLORIDE 100 MG: 20 INJECTION, SOLUTION INFILTRATION; PERINEURAL at 12:42

## 2017-04-25 RX ADMIN — OXYCODONE HYDROCHLORIDE 5 MG: 5 TABLET ORAL at 14:09

## 2017-04-25 RX ADMIN — HYDROMORPHONE HYDROCHLORIDE 0.5 MG: 1 INJECTION, SOLUTION INTRAMUSCULAR; INTRAVENOUS; SUBCUTANEOUS at 13:44

## 2017-04-25 RX ADMIN — FENTANYL CITRATE 50 MCG: 50 INJECTION, SOLUTION INTRAMUSCULAR; INTRAVENOUS at 12:40

## 2017-04-25 RX ADMIN — ONDANSETRON 4 MG: 2 INJECTION INTRAMUSCULAR; INTRAVENOUS at 13:01

## 2017-04-25 RX ADMIN — NEOSTIGMINE METHYLSULFATE 3.5 MG: 1 INJECTION INTRAMUSCULAR; INTRAVENOUS; SUBCUTANEOUS at 13:15

## 2017-04-25 RX ADMIN — PROPOFOL 200 MG: 10 INJECTION, EMULSION INTRAVENOUS at 12:42

## 2017-04-25 RX ADMIN — MIDAZOLAM HYDROCHLORIDE 2 MG: 1 INJECTION, SOLUTION INTRAMUSCULAR; INTRAVENOUS at 12:40

## 2017-04-25 RX ADMIN — DEXAMETHASONE SODIUM PHOSPHATE 4 MG: 4 INJECTION, SOLUTION INTRA-ARTICULAR; INTRALESIONAL; INTRAMUSCULAR; INTRAVENOUS; SOFT TISSUE at 12:56

## 2017-04-25 RX ADMIN — CEFAZOLIN SODIUM 2 G: 2 INJECTION, SOLUTION INTRAVENOUS at 12:45

## 2017-04-25 RX ADMIN — PROPOFOL 175 MCG/KG/MIN: 10 INJECTION, EMULSION INTRAVENOUS at 12:45

## 2017-04-25 RX ADMIN — GLYCOPYRROLATE 0.5 MG: 0.2 INJECTION, SOLUTION INTRAMUSCULAR; INTRAVENOUS at 13:15

## 2017-04-25 RX ADMIN — SODIUM CHLORIDE, POTASSIUM CHLORIDE, SODIUM LACTATE AND CALCIUM CHLORIDE: 600; 310; 30; 20 INJECTION, SOLUTION INTRAVENOUS at 12:40

## 2017-04-25 RX ADMIN — ROCURONIUM BROMIDE 30 MG: 10 INJECTION INTRAVENOUS at 12:43

## 2017-04-25 RX ADMIN — FENTANYL CITRATE 50 MCG: 50 INJECTION, SOLUTION INTRAMUSCULAR; INTRAVENOUS at 12:52

## 2017-04-25 RX ADMIN — HYDROMORPHONE HYDROCHLORIDE 0.5 MG: 1 INJECTION, SOLUTION INTRAMUSCULAR; INTRAVENOUS; SUBCUTANEOUS at 13:38

## 2017-04-25 ASSESSMENT — LIFESTYLE VARIABLES: TOBACCO_USE: 1

## 2017-04-25 NOTE — OR NURSING
NINI drain care done in phase 2 with patient and Reva SO. Patient and Reva verbalize understanding.

## 2017-04-25 NOTE — ADDENDUM NOTE
Addendum  created 04/25/17 1551 by Yariel Anthony MD    Anesthesia Review and Sign - Ready for Procedure, Anesthesia Review and Sign - Signed

## 2017-04-25 NOTE — ANESTHESIA PREPROCEDURE EVALUATION
Anesthesia Evaluation     . Pt has had prior anesthetic. Type: General    No history of anesthetic complications          ROS/MED HX    ENT/Pulmonary:     (+)tobacco use, Current use , . .    Neurologic:       Cardiovascular:  - neg cardiovascular ROS       METS/Exercise Tolerance:     Hematologic:         Musculoskeletal: Comment: recent back surgery        GI/Hepatic:  - neg GI/hepatic ROS       Renal/Genitourinary:         Endo:         Psychiatric:     (+) psychiatric history bipolar      Infectious Disease:         Malignancy:         Other:                     Physical Exam  Normal systems: cardiovascular and pulmonary    Airway   Mallampati: I  TM distance: >3 FB  Neck ROM: full    Dental   (+) missing, loose and chipped    Cardiovascular   Rhythm and rate: regular and normal      Pulmonary    breath sounds clear to auscultation                    Anesthesia Plan      History & Physical Review  History and physical reviewed and following examination; no interval change.    ASA Status:  2 .    NPO Status:  > 8 hours    Plan for General and ETT with Propofol induction. Maintenance will be TIVA.    PONV prophylaxis:  Ondansetron (or other 5HT-3) and Dexamethasone or Solumedrol  Poor dentition      Postoperative Care  Postoperative pain management:  IV analgesics and Oral pain medications.      Consents  Anesthetic plan, risks, benefits and alternatives discussed with:  Patient..                          .

## 2017-04-25 NOTE — ANESTHESIA CARE TRANSFER NOTE
Patient: Ko Bell    Procedure(s):  EXCISION OF HEMATOMA CAVITY LOWER BACK, CLOSURE WITH LOCAL MUSCLE AND SKIN FLAPS - Wound Class: I-Clean    Diagnosis: HISTORY OF BACK HEMATOMA  Diagnosis Additional Information: No value filed.    Anesthesia Type:   General     Note:  Airway :Face Mask  Patient transferred to:PACU  Comments: Transferred to PACU, spontaneous respirations, 10L oxygen via facemask.  All monitors and alarms on and functioning, VSS.  Patient sleepy but follows commands, comfortable.  Report to PACU RN.      Vitals: (Last set prior to Anesthesia Care Transfer)    CRNA VITALS  4/25/2017 1254 - 4/25/2017 1329      4/25/2017             Pulse: 74    SpO2: 95 %    Resp Rate (observed): (!)  2    Resp Rate (set): 10                Electronically Signed By: MARTHA Acevedo CRNA  April 25, 2017  1:29 PM

## 2017-04-25 NOTE — ANESTHESIA POSTPROCEDURE EVALUATION
Patient: Ko Bell    Procedure(s):  EXCISION OF HEMATOMA CAVITY LOWER BACK, CLOSURE WITH LOCAL MUSCLE AND SKIN FLAPS - Wound Class: I-Clean    Diagnosis:HISTORY OF BACK HEMATOMA  Diagnosis Additional Information: No value filed.    Anesthesia Type:  General    Note:  Anesthesia Post Evaluation    Patient location during evaluation: PACU  Patient participation: Able to fully participate in evaluation  Level of consciousness: awake  Pain management: adequate  Airway patency: patent  Cardiovascular status: acceptable  Respiratory status: acceptable  Hydration status: acceptable  PONV: none     Anesthetic complications: None          Last vitals:  Vitals:    04/25/17 1328 04/25/17 1330 04/25/17 1345   BP: 119/75 113/78    Resp: 14 15 12   Temp: 36.2  C (97.2  F)     SpO2: 100% 100% 99%         Electronically Signed By: Yariel Anthony MD  April 25, 2017  1:48 PM

## 2017-04-25 NOTE — IP AVS SNAPSHOT
[facility]    [address]    [place] [address]    Phone:  [phone]    Fax:  [phone]                                       After Visit Summary   4/25/2017    Ko Bell    MRN: [id number]           After Visit Summary Signature Page     I have received my discharge instructions, and my questions have been answered. I have discussed any challenges I see with this plan with the nurse or doctor.    ..........................................................................................................................................  Patient/Patient Representative Signature      ..........................................................................................................................................  Patient Representative Print Name and Relationship to Patient    ..................................................               ................................................  Date                                            Time    ..........................................................................................................................................  Reviewed by Signature/Title    ...................................................              ..............................................  Date                                                            Time

## 2017-04-25 NOTE — DISCHARGE INSTRUCTIONS
Next week Wednesday w/ Dr. Palma.  Call [phone].     No heavy lifting or strenuous activity.  10 lb lifting limit.      May shower and remove dressing tomorrow.      Drain care as instructed.      Reasons to contact your surgeon:    1. Signs of possible infection: Check your incision daily for redness, swelling, warmth, red streaks or foul drainage.   2. Elevated temperature.  3. Pain not controlled with pain medication and/or rest.   4. Uncontrolled nausea or vomiting.  5. Any questions or concerns.      Same Day Surgery Discharge Instructions for  Sedation and General Anesthesia       It's not unusual to feel dizzy, light-headed or faint for up to 24 hours after surgery or while taking pain medication.  If you have these symptoms: sit for a few minutes before standing and have someone assist you when you get up to walk or use the bathroom.      You should rest and relax for the next 24 hours. We recommend you make arrangements to have an adult stay with you for at least 24 hours after your discharge.  Avoid hazardous and strenuous activity.      DO NOT DRIVE any vehicle or operate mechanical equipment for 24 hours following the end of your surgery.  Even though you may feel normal, your reactions may be affected by the medication you have received.      Do not drink alcoholic beverages for 24 hours following surgery.       Slowly progress to your regular diet as you feel able. It's not unusual to feel nauseated and/or vomit after receiving anesthesia.  If you develop these symptoms, drink clear liquids (apple juice, ginger ale, broth, 7-up, etc. ) until you feel better.  If your nausea and vomiting persists for 24 hours, please notify your surgeon.        All narcotic pain medications, along with inactivity and anesthesia, can cause constipation. Drinking plenty of liquids and increasing fiber intake will help.      For any questions of a medical nature, call your surgeon.      Do not make important  decisions for 24 hours.      If you had general anesthesia, you may have a sore throat for a couple of days related to the breathing tube used during surgery.  You may use Cepacol lozenges to help with this discomfort.  If it worsens or if you develop a fever, contact your surgeon.       If you feel your pain is not well managed with the pain medications prescribed by your surgeon, please contact your surgeon's office to let them know so they can address your concerns.       Luis Pritchett Drain  Home Care Instructions    What is a Luis Pritchett (NINI) Drain?  This is a small tube that connects to a bulb.  Its gentle suction removes extra fluid from a surgical wound.  Your doctor will remove the tube when the amount of fluid decreases.  The color and amount of fluid varies.  Right after surgery the fluid is bright red.  Over time, it changes to light pink and may become clear or the color of straw.    How should I care for my tube site?    Keep the skin around the tube dry.  Check with your doctor about how to shower.  You may need to cover the site with plastic when you shower.  Or, it may be okay to let the site get wet and put on a clean bandage after you shower.      If the bandage gets wet, you will need to change it.  How should I care for the bulb?    Keep the bulb compressed at all times except while you empty it.     Attach the bulb to your clothing with tape and a safety pin.    Try to empty the bulb at the same time every day.  Empty the bulb at least once a day, or when the bulb becomes half full.  If it becomes too full, there will not be enough suction.    To empty the bulb:    Wash your hands.    Open the bulb cap.    Drain the fluid from the bulb into the measuring cup.  If you have two drains, use two cups.      Clean the mouth of the bulb with an alcohol wipe if your nurse told you to.    Squeeze the bulb (fold it in half before you close the bulb cap) If it does not stay compressed, call your nurse  or clinic.    Write the amount of drainage on the drainage record (see back page).  If you have two drains, write the amount for each bulb.    Flush the drainage down the toilet.  Rinse the measuring cup.    Wash your hands.    When should I call my doctor?   Call your doctor if:    You have a fever over 101 F (38.3 C), taken under the tongue.     The drainage increases or smells bad.    The skin around your tube has increased redness, swelling, warmth or pain.    You have pus or fluid leaking at the tube site.    Your stitches break.    You think the tube is not draining.    The tube falls out.    You have any problems or concerns.    Your drainage record:    Empty your bulb at least once per day or when 1/2 to 1/3 full.  Write down the date, time and amount of drainage in each bulb.   Bring this record to each clinic visit.    Date Time Bulb 1: amount of  Drainage in (ml or cc) Bulb 2: amount of drainage (in ml or cc) Notes                                                          **If you have questions or concerns about your procedure,   call Dr Palma at [phone]**

## 2017-04-25 NOTE — IP AVS SNAPSHOT
MRN:[id number]                      After Visit Summary   4/25/2017    Ko Bell    MRN: [id number]           Thank you!     Thank you for choosing [facility] for your care. Our goal is always to provide you with excellent care. Hearing back from our patients is one way we can continue to improve our services. Please take a few minutes to complete the written survey that you may receive in the mail after you visit with us. Thank you!        Patient Information     Date Of Birth          1980        About your hospital stay     You were admitted on:  April 25, 2017 You last received care in the:  [facility]    You were discharged on:  April 25, 2017       Who to Call     For medical emergencies, please call 911.  For non-urgent questions about your medical care, please call your primary care provider or clinic, [phone]  For questions related to your surgery, please call your surgery clinic        Attending Provider     Provider Wayne Patel MD Plastic Surgery       Primary Care Provider Office Phone # Fax #    Bethany James PA-C [phone]       [address] [address]        Your next 10 appointments already scheduled     May 17, 2017  9:15 AM CDT   [facility] Visit with Shirley Wilkinson NP   [facility]    [address]  [address]  [facility] [address] [phone]              Further instructions from your care team           Next week Wednesday w/ Dr. Palma.  Call [phone].     No heavy lifting or strenuous activity.  10 lb lifting limit.      May shower and remove dressing tomorrow.      Drain care as instructed.      Reasons to contact your surgeon:    1. Signs of possible infection: Check your incision daily for redness, swelling, warmth, red streaks or foul drainage.   2. Elevated  temperature.  3. Pain not controlled with pain medication and/or rest.   4. Uncontrolled nausea or vomiting.  5. Any questions or concerns.      Same Day Surgery Discharge Instructions for  Sedation and General Anesthesia       It's not unusual to feel dizzy, light-headed or faint for up to 24 hours after surgery or while taking pain medication.  If you have these symptoms: sit for a few minutes before standing and have someone assist you when you get up to walk or use the bathroom.      You should rest and relax for the next 24 hours. We recommend you make arrangements to have an adult stay with you for at least 24 hours after your discharge.  Avoid hazardous and strenuous activity.      DO NOT DRIVE any vehicle or operate mechanical equipment for 24 hours following the end of your surgery.  Even though you may feel normal, your reactions may be affected by the medication you have received.      Do not drink alcoholic beverages for 24 hours following surgery.       Slowly progress to your regular diet as you feel able. It's not unusual to feel nauseated and/or vomit after receiving anesthesia.  If you develop these symptoms, drink clear liquids (apple juice, ginger ale, broth, 7-up, etc. ) until you feel better.  If your nausea and vomiting persists for 24 hours, please notify your surgeon.        All narcotic pain medications, along with inactivity and anesthesia, can cause constipation. Drinking plenty of liquids and increasing fiber intake will help.      For any questions of a medical nature, call your surgeon.      Do not make important decisions for 24 hours.      If you had general anesthesia, you may have a sore throat for a couple of days related to the breathing tube used during surgery.  You may use Cepacol lozenges to help with this discomfort.  If it worsens or if you develop a fever, contact your surgeon.       If you feel your pain is not well managed with the pain medications prescribed by your  surgeon, please contact your surgeon's office to let them know so they can address your concerns.       Luis Pritchett Drain  Home Care Instructions    What is a Luis Pritchett (NINI) Drain?  This is a small tube that connects to a bulb.  Its gentle suction removes extra fluid from a surgical wound.  Your doctor will remove the tube when the amount of fluid decreases.  The color and amount of fluid varies.  Right after surgery the fluid is bright red.  Over time, it changes to light pink and may become clear or the color of straw.    How should I care for my tube site?    Keep the skin around the tube dry.  Check with your doctor about how to shower.  You may need to cover the site with plastic when you shower.  Or, it may be okay to let the site get wet and put on a clean bandage after you shower.      If the bandage gets wet, you will need to change it.  How should I care for the bulb?    Keep the bulb compressed at all times except while you empty it.     Attach the bulb to your clothing with tape and a safety pin.    Try to empty the bulb at the same time every day.  Empty the bulb at least once a day, or when the bulb becomes half full.  If it becomes too full, there will not be enough suction.    To empty the bulb:    Wash your hands.    Open the bulb cap.    Drain the fluid from the bulb into the measuring cup.  If you have two drains, use two cups.      Clean the mouth of the bulb with an alcohol wipe if your nurse told you to.    Squeeze the bulb (fold it in half before you close the bulb cap) If it does not stay compressed, call your nurse or clinic.    Write the amount of drainage on the drainage record (see back page).  If you have two drains, write the amount for each bulb.    Flush the drainage down the toilet.  Rinse the measuring cup.    Wash your hands.    When should I call my doctor?   Call your doctor if:    You have a fever over 101 F (38.3 C), taken under the tongue.     The drainage increases or  "smells bad.    The skin around your tube has increased redness, swelling, warmth or pain.    You have pus or fluid leaking at the tube site.    Your stitches break.    You think the tube is not draining.    The tube falls out.    You have any problems or concerns.    Your drainage record:    Empty your bulb at least once per day or when 1/2 to 1/3 full.  Write down the date, time and amount of drainage in each bulb.   Bring this record to each clinic visit.    Date Time Bulb 1: amount of  Drainage in (ml or cc) Bulb 2: amount of drainage (in ml or cc) Notes                                                          **If you have questions or concerns about your procedure,   call Dr Palma at [phone]**          Pending Results     No orders found from 4/23/2017 to 4/26/2017.            Admission Information     Date & Time Provider Department Dept. Phone    4/25/2017 Wayne Palma MD [facility] [phone]      Your Vitals Were     Blood Pressure Temperature Respirations Height Weight Pulse Oximetry    96/54 97.2  F (36.2  C) (Temporal) 15 1.702 m (5' 7\") 72.2 kg (159 lb 3.2 oz) 99%    BMI (Body Mass Index)                   24.93 kg/m2           MyChart Information     [facility] gives you secure access to your electronic health record. If you see a primary care provider, you can also send messages to your care team and make appointments. If you have questions, please call your primary care clinic.  If you do not have a primary care provider, please call [phone] and they will assist you.        Care EveryWhere ID     This is your Care EveryWhere ID. This could be used by other organizations to access your [facility] medical records  [id number]           Review of your medicines      CONTINUE these medicines which have NOT CHANGED        Dose / Directions    cyclobenzaprine 10 MG tablet   Commonly known as:  FLEXERIL   Used for:  S/P lumbar microdiscectomy        Dose:  10 " mg   Take 1 tablet (10 mg) by mouth 3 times daily as needed for muscle spasms   Quantity:  90 tablet   Refills:  1       oxyCODONE 5 MG IR tablet   Commonly known as:  ROXICODONE   Used for:  Postoperative or surgical complication, initial encounter   Notes to Patient:  One pill given at 2:09 pm 4/25/17        Dose:  5 mg   Take 1 tablet (5 mg) by mouth every 4 hours as needed for pain maximum 6 tablet(s) per day   Quantity:  50 tablet   Refills:  0            Where to get your medicines      Some of these will need a paper prescription and others can be bought over the counter. Ask your nurse if you have questions.     Bring a paper prescription for each of these medications     oxyCODONE 5 MG IR tablet                Protect others around you: Learn how to safely use, store and throw away your medicines at www.disposemymeds.org.             Medication List: This is a list of all your medications and when to take them. Check marks below indicate your daily home schedule. Keep this list as a reference.      Medications           Morning Afternoon Evening Bedtime As Needed    cyclobenzaprine 10 MG tablet   Commonly known as:  FLEXERIL   Take 1 tablet (10 mg) by mouth 3 times daily as needed for muscle spasms                                oxyCODONE 5 MG IR tablet   Commonly known as:  ROXICODONE   Take 1 tablet (5 mg) by mouth every 4 hours as needed for pain maximum 6 tablet(s) per day   Last time this was given:  5 mg on 4/25/2017  2:09 PM   Notes to Patient:  One pill given at 2:09 pm 4/25/17

## 2017-04-26 ENCOUNTER — MYC MEDICAL ADVICE (OUTPATIENT)
Dept: FAMILY MEDICINE | Facility: CLINIC | Age: 37
End: 2017-04-26

## 2017-04-26 DIAGNOSIS — G89.18 POSTOPERATIVE PAIN: Primary | ICD-10-CM

## 2017-04-26 RX ORDER — MORPHINE SULFATE 30 MG/1
30 TABLET, FILM COATED, EXTENDED RELEASE ORAL EVERY 12 HOURS
Qty: 60 TABLET | Refills: 0 | Status: SHIPPED | OUTPATIENT
Start: 2017-04-26 | End: 2017-05-17

## 2017-04-26 NOTE — OP NOTE
DATE OF PROCEDURE:  04/25/2017      SURGEON:  Wayne Palma MD      PREOPERATIVE DIAGNOSIS:  Chronic painful mass at the microdiskectomy site lower back.      POSTOPERATIVE DIAGNOSIS:  Chronic painful mass at the microdiskectomy site lower back.      PROCEDURE:  Excision of chronic scar and muscle flap closure of a herniated paraspinous muscles.      DESCRIPTION OF PROCEDURE:  Ko Bell was marked preoperatively.  He has a history some months ago undergone a microdiskectomy which then developed a hematoma.  This was subsequently explored.  He has a recurrence of the swelling in his lower back and chronic pain from this.  The risks and benefits of exploring this were discussed and the patient was agreeable to proceed.  He was placed under general endotracheal anesthesia and then turned into the prone position with careful padding and positioning of the extremities.  A timeout was done in the lower back was prepped and draped in a sterile fashion.  I excised his previous scar and extended this superiorly and inferiorly to allow good exposure.  I dissected around the large mass of chronic scar and then dissected down to the surface of his left paraspinous muscles.  I found that this fascia was absent and the paraspinous muscles were herniating through this.  This was creating the bulk of the mass itself.  There was no other sign of infection, drainage or any involvement of deeper paraspinous tissues at all.      I then mobilized the left paraspinous muscle including its overlying fascia, I released this quite a bit laterally so this could advance and close over the bulging muscle and repair the structure of the left paraspinous.  This was advanced and closed with multi layered suture of 2-0 Vicryl.  This created a nice reconstitution of the contours of his back and paraspinous region and the mass effect was completely gone.  The soft tissues were injected with 0.25% Marcaine 1:200,000 epinephrine total 20 mL.   A drain was placed and then the deeper and more superficial tissues were closed with 3-0 Vicryl and 4-0 Vicryl respectively.  Sterile dressing was placed.  The drain was sewn in with 2-0 silk.  Sterile dressings were applied.  Anesthesia was reversed and the patient was taken to the recovery room in satisfactory condition.         EDMOND PATEL MD             D: 2017 15:11   T: 2017 23:31   MT: [id number]      Name:     TATIANA STARK   MRN:      [id number]        Account:        [id number]   :      1980           Procedure Date: 2017      Document: [id number]

## 2017-05-03 ENCOUNTER — MYC MEDICAL ADVICE (OUTPATIENT)
Dept: FAMILY MEDICINE | Facility: CLINIC | Age: 37
End: 2017-05-03

## 2017-05-03 DIAGNOSIS — T81.9XXA POSTOPERATIVE OR SURGICAL COMPLICATION, INITIAL ENCOUNTER: ICD-10-CM

## 2017-05-03 RX ORDER — OXYCODONE HYDROCHLORIDE 5 MG/1
5 TABLET ORAL EVERY 4 HOURS PRN
Qty: 75 TABLET | Refills: 0 | Status: SHIPPED | OUTPATIENT
Start: 2017-05-03 | End: 2017-05-12

## 2017-05-12 ENCOUNTER — MYC MEDICAL ADVICE (OUTPATIENT)
Dept: FAMILY MEDICINE | Facility: CLINIC | Age: 37
End: 2017-05-12

## 2017-05-12 DIAGNOSIS — T81.9XXA POSTOPERATIVE OR SURGICAL COMPLICATION, INITIAL ENCOUNTER: ICD-10-CM

## 2017-05-12 RX ORDER — OXYCODONE HYDROCHLORIDE 5 MG/1
5 TABLET ORAL EVERY 4 HOURS PRN
Qty: 75 TABLET | Refills: 0 | Status: SHIPPED | OUTPATIENT
Start: 2017-05-12 | End: 2017-05-17

## 2017-05-12 NOTE — TELEPHONE ENCOUNTER
Forwarded to Bethany James PA-C.  Please review patient's Mychart message and advise.  Melyssa Lares, RN, BS, PHN

## 2017-05-12 NOTE — TELEPHONE ENCOUNTER
Script taken to [facility].  Patient notified by mychart.    Melyssa Lares, HERMES, RN, PHN  [facility]

## 2017-05-15 ENCOUNTER — TRANSFERRED RECORDS (OUTPATIENT)
Dept: HEALTH INFORMATION MANAGEMENT | Facility: CLINIC | Age: 37
End: 2017-05-15

## 2017-05-15 ENCOUNTER — OFFICE VISIT (OUTPATIENT)
Dept: FAMILY MEDICINE | Facility: CLINIC | Age: 37
End: 2017-05-15
Payer: COMMERCIAL

## 2017-05-15 VITALS
DIASTOLIC BLOOD PRESSURE: 84 MMHG | OXYGEN SATURATION: 99 % | WEIGHT: 161.13 LBS | SYSTOLIC BLOOD PRESSURE: 122 MMHG | HEART RATE: 132 BPM | HEIGHT: 67 IN | BODY MASS INDEX: 25.29 KG/M2 | TEMPERATURE: 100.2 F

## 2017-05-15 DIAGNOSIS — T81.40XA POSTOPERATIVE INFECTION, INITIAL ENCOUNTER: Primary | ICD-10-CM

## 2017-05-15 DIAGNOSIS — G89.29 OTHER CHRONIC PAIN: ICD-10-CM

## 2017-05-15 DIAGNOSIS — M96.842 POSTOPERATIVE SEROMA OF MUSCULOSKELETAL STRUCTURE AFTER MUSCULOSKELETAL PROCEDURE: ICD-10-CM

## 2017-05-15 DIAGNOSIS — R50.9 FEVER, UNSPECIFIED: ICD-10-CM

## 2017-05-15 LAB
BASOPHILS # BLD AUTO: 0 10E9/L (ref 0–0.2)
BASOPHILS NFR BLD AUTO: 0.1 %
CRP SERPL-MCNC: 28 MG/L (ref 0–8)
DIFFERENTIAL METHOD BLD: ABNORMAL
EOSINOPHIL # BLD AUTO: 0.1 10E9/L (ref 0–0.7)
EOSINOPHIL NFR BLD AUTO: 0.5 %
ERYTHROCYTE [DISTWIDTH] IN BLOOD BY AUTOMATED COUNT: 12.8 % (ref 10–15)
ERYTHROCYTE [SEDIMENTATION RATE] IN BLOOD BY WESTERGREN METHOD: 9 MM/H (ref 0–15)
HCT VFR BLD AUTO: 42 % (ref 40–53)
HGB BLD-MCNC: 14.5 G/DL (ref 13.3–17.7)
LYMPHOCYTES # BLD AUTO: 0.5 10E9/L (ref 0.8–5.3)
LYMPHOCYTES NFR BLD AUTO: 5 %
MCH RBC QN AUTO: 29.4 PG (ref 26.5–33)
MCHC RBC AUTO-ENTMCNC: 34.5 G/DL (ref 31.5–36.5)
MCV RBC AUTO: 85 FL (ref 78–100)
MONOCYTES # BLD AUTO: 1 10E9/L (ref 0–1.3)
MONOCYTES NFR BLD AUTO: 9.2 %
NEUTROPHILS # BLD AUTO: 8.8 10E9/L (ref 1.6–8.3)
NEUTROPHILS NFR BLD AUTO: 85.2 %
PLATELET # BLD AUTO: 154 10E9/L (ref 150–450)
PROCALCITONIN SERPL-MCNC: NORMAL NG/ML
RBC # BLD AUTO: 4.93 10E12/L (ref 4.4–5.9)
WBC # BLD AUTO: 10.3 10E9/L (ref 4–11)

## 2017-05-15 PROCEDURE — 87040 BLOOD CULTURE FOR BACTERIA: CPT | Performed by: PHYSICIAN ASSISTANT

## 2017-05-15 PROCEDURE — 84145 PROCALCITONIN (PCT): CPT | Performed by: PHYSICIAN ASSISTANT

## 2017-05-15 PROCEDURE — 85652 RBC SED RATE AUTOMATED: CPT | Performed by: PHYSICIAN ASSISTANT

## 2017-05-15 PROCEDURE — 85025 COMPLETE CBC W/AUTO DIFF WBC: CPT | Performed by: PHYSICIAN ASSISTANT

## 2017-05-15 PROCEDURE — 86140 C-REACTIVE PROTEIN: CPT | Performed by: PHYSICIAN ASSISTANT

## 2017-05-15 PROCEDURE — 99215 OFFICE O/P EST HI 40 MIN: CPT | Performed by: PHYSICIAN ASSISTANT

## 2017-05-15 PROCEDURE — 36415 COLL VENOUS BLD VENIPUNCTURE: CPT | Performed by: PHYSICIAN ASSISTANT

## 2017-05-15 NOTE — MR AVS SNAPSHOT
After Visit Summary   5/15/2017    Ko Bell    MRN: [id number]           Patient Information     Date Of Birth          1980        Visit Information        Provider Department      5/15/2017 11:20 AM Bethany James PA-C [facility]        Today's Diagnoses     Postoperative infection, initial encounter    -  1    Postoperative seroma of musculoskeletal structure after musculoskeletal procedure        Fever, unspecified        Other chronic pain           Follow-ups after your visit        Your next 10 appointments already scheduled     May 17, 2017  9:15 AM CDT   New Visit with Shirley Wilkinson NP   [facility] ([facility]    [address]  [address]  [facility] [address] [phone]              Who to contact     If you have questions or need follow up information about today's clinic visit or your schedule please contact [facility] directly at [phone].  Normal or non-critical lab and imaging results will be communicated to you by MyChart, letter or phone within 4 business days after the clinic has received the results. If you do not hear from us within 7 days, please contact the clinic through Access Mobile[employer] or phone. If you have a critical or abnormal lab result, we will notify you by phone as soon as possible.  Submit refill requests through [email or web address] or call your pharmacy and they will forward the refill request to us. Please allow 3 business days for your refill to be completed.          Additional Information About Your Visit        MyChart Information     [email or web address] gives you secure access to your electronic health record. If you see a primary care provider, you can also send messages to your care team and make appointments. If you have questions, please call your primary care clinic.  If you do not have a primary care provider, please call [phone] and they will assist you.        Care  "EveryWhere ID     This is your Care EveryWhere ID. This could be used by other organizations to access your [place] medical records  [id number]        Your Vitals Were     Pulse Temperature Height Pulse Oximetry BMI (Body Mass Index)       132 100.2  F (37.9  C) (Tympanic) 5' 7\" (1.702 m) 99% 25.24 kg/m2        Blood Pressure from Last 3 Encounters:   05/15/17 122/84   04/25/17 115/78   04/11/17 118/78    Weight from Last 3 Encounters:   05/15/17 161 lb 2 oz (73.1 kg)   04/25/17 159 lb 3.2 oz (72.2 kg)   04/11/17 161 lb 6 oz (73.2 kg)              We Performed the Following     *UA reflex to Microscopic and Culture ([place] [facility] (except [place] and [place])     Blood culture     Blood culture     CBC with platelets and differential     CRP, inflammation     Drug abuse screen 8 urine (UR)     ESR: Erythrocyte sedimentation rate     Procalcitonin        Primary Care Provider Office Phone # Fax #    Bethany Malou James PA-C [phone]       [address] [address]        Thank you!     Thank you for choosing [facility]  for your care. Our goal is always to provide you with excellent care. Hearing back from our patients is one way we can continue to improve our services. Please take a few minutes to complete the written survey that you may receive in the mail after your visit with us. Thank you!             Your Updated Medication List - Protect others around you: Learn how to safely use, store and throw away your medicines at www.disposemymeds.org.          This list is accurate as of: 5/15/17  1:20 PM.  Always use your most recent med list.                   Brand Name Dispense Instructions for use    cyclobenzaprine 10 MG tablet    FLEXERIL    90 tablet    Take 1 tablet (10 mg) by mouth 3 times daily as needed for muscle spasms       morphine 30 MG 12 hr tablet    MS CONTIN    60 tablet    Take 1 tablet (30 mg) by mouth " every 12 hours maximum 2 tablet(s) per day       oxyCODONE 5 MG IR tablet    ROXICODONE    75 tablet    Take 1 tablet (5 mg) by mouth every 4 hours as needed for pain maximum 6 tablet(s) per day
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
## 2017-05-15 NOTE — NURSING NOTE
"Chief Complaint   Patient presents with     Recheck Medication       Initial /84  Pulse 132  Temp 100.2  F (37.9  C) (Tympanic)  Ht 5' 7\" (1.702 m)  Wt 161 lb 2 oz (73.1 kg)  SpO2 99%  BMI 25.24 kg/m2 Estimated body mass index is 25.24 kg/(m^2) as calculated from the following:    Height as of this encounter: 5' 7\" (1.702 m).    Weight as of this encounter: 161 lb 2 oz (73.1 kg).  Medication Reconciliation: complete   Liya Medina, GEOVANI      "

## 2017-05-17 ENCOUNTER — OFFICE VISIT (OUTPATIENT)
Dept: FAMILY MEDICINE | Facility: CLINIC | Age: 37
End: 2017-05-17
Payer: COMMERCIAL

## 2017-05-17 ENCOUNTER — OFFICE VISIT (OUTPATIENT)
Dept: PSYCHIATRY | Facility: CLINIC | Age: 37
End: 2017-05-17
Attending: PHYSICIAN ASSISTANT
Payer: COMMERCIAL

## 2017-05-17 ENCOUNTER — ANESTHESIA (OUTPATIENT)
Dept: SURGERY | Facility: CLINIC | Age: 37
DRG: 856 | End: 2017-05-17
Payer: COMMERCIAL

## 2017-05-17 ENCOUNTER — ANESTHESIA EVENT (OUTPATIENT)
Dept: SURGERY | Facility: CLINIC | Age: 37
DRG: 856 | End: 2017-05-17
Payer: COMMERCIAL

## 2017-05-17 ENCOUNTER — HOSPITAL ENCOUNTER (INPATIENT)
Facility: CLINIC | Age: 37
LOS: 5 days | Discharge: HOME | DRG: 856 | End: 2017-05-22
Attending: PLASTIC SURGERY | Admitting: INTERNAL MEDICINE
Payer: COMMERCIAL

## 2017-05-17 VITALS
WEIGHT: 162 LBS | TEMPERATURE: 98.7 F | SYSTOLIC BLOOD PRESSURE: 110 MMHG | BODY MASS INDEX: 25.43 KG/M2 | HEIGHT: 67 IN | OXYGEN SATURATION: 99 % | DIASTOLIC BLOOD PRESSURE: 60 MMHG | HEART RATE: 114 BPM

## 2017-05-17 VITALS
TEMPERATURE: 98.6 F | WEIGHT: 162 LBS | OXYGEN SATURATION: 99 % | HEIGHT: 67 IN | HEART RATE: 95 BPM | BODY MASS INDEX: 25.43 KG/M2

## 2017-05-17 DIAGNOSIS — G89.28 CHRONIC PAIN FOLLOWING SURGERY OR PROCEDURE: ICD-10-CM

## 2017-05-17 DIAGNOSIS — T81.40XD POSTOPERATIVE INFECTION, SUBSEQUENT ENCOUNTER: ICD-10-CM

## 2017-05-17 DIAGNOSIS — M96.842 POSTOPERATIVE SEROMA OF MUSCULOSKELETAL STRUCTURE AFTER MUSCULOSKELETAL PROCEDURE: Primary | ICD-10-CM

## 2017-05-17 DIAGNOSIS — L08.9 WOUND INFECTION: Primary | ICD-10-CM

## 2017-05-17 DIAGNOSIS — L03.90 CELLULITIS: ICD-10-CM

## 2017-05-17 DIAGNOSIS — F17.200 TOBACCO USE DISORDER: Primary | ICD-10-CM

## 2017-05-17 DIAGNOSIS — T14.8XXA WOUND INFECTION: Primary | ICD-10-CM

## 2017-05-17 DIAGNOSIS — F39 MOOD DISORDER (H): ICD-10-CM

## 2017-05-17 DIAGNOSIS — F17.200 TOBACCO USE DISORDER: ICD-10-CM

## 2017-05-17 PROBLEM — A41.9 SEPSIS (H): Status: ACTIVE | Noted: 2017-05-17

## 2017-05-17 LAB
ABO + RH BLD: NORMAL
ABO + RH BLD: NORMAL
ALBUMIN SERPL-MCNC: 3.1 G/DL (ref 3.4–5)
ALP SERPL-CCNC: 80 U/L (ref 40–150)
ALT SERPL W P-5'-P-CCNC: 31 U/L (ref 0–70)
ANION GAP SERPL CALCULATED.3IONS-SCNC: 6 MMOL/L (ref 3–14)
AST SERPL W P-5'-P-CCNC: 21 U/L (ref 0–45)
BASOPHILS # BLD AUTO: 0 10E9/L (ref 0–0.2)
BASOPHILS # BLD AUTO: 0 10E9/L (ref 0–0.2)
BASOPHILS NFR BLD AUTO: 0.2 %
BASOPHILS NFR BLD AUTO: 0.2 %
BILIRUB SERPL-MCNC: 0.4 MG/DL (ref 0.2–1.3)
BLD GP AB SCN SERPL QL: NORMAL
BLOOD BANK CMNT PATIENT-IMP: NORMAL
BUN SERPL-MCNC: 11 MG/DL (ref 7–30)
CALCIUM SERPL-MCNC: 9.1 MG/DL (ref 8.5–10.1)
CHLORIDE SERPL-SCNC: 101 MMOL/L (ref 94–109)
CO2 SERPL-SCNC: 29 MMOL/L (ref 20–32)
CREAT SERPL-MCNC: 0.76 MG/DL (ref 0.66–1.25)
CRP SERPL-MCNC: 140 MG/L (ref 0–8)
DIFFERENTIAL METHOD BLD: ABNORMAL
DIFFERENTIAL METHOD BLD: NORMAL
EOSINOPHIL # BLD AUTO: 0.1 10E9/L (ref 0–0.7)
EOSINOPHIL # BLD AUTO: 0.1 10E9/L (ref 0–0.7)
EOSINOPHIL NFR BLD AUTO: 0.9 %
EOSINOPHIL NFR BLD AUTO: 0.9 %
ERYTHROCYTE [DISTWIDTH] IN BLOOD BY AUTOMATED COUNT: 13 % (ref 10–15)
ERYTHROCYTE [DISTWIDTH] IN BLOOD BY AUTOMATED COUNT: 13.2 % (ref 10–15)
ERYTHROCYTE [SEDIMENTATION RATE] IN BLOOD BY WESTERGREN METHOD: 29 MM/H (ref 0–15)
GFR SERPL CREATININE-BSD FRML MDRD: ABNORMAL ML/MIN/1.7M2
GLUCOSE SERPL-MCNC: 96 MG/DL (ref 70–99)
GRAM STN SPEC: ABNORMAL
HCT VFR BLD AUTO: 40.1 % (ref 40–53)
HCT VFR BLD AUTO: 40.8 % (ref 40–53)
HGB BLD-MCNC: 14 G/DL (ref 13.3–17.7)
HGB BLD-MCNC: 14 G/DL (ref 13.3–17.7)
IMM GRANULOCYTES # BLD: 0 10E9/L (ref 0–0.4)
IMM GRANULOCYTES NFR BLD: 0.2 %
INR PPP: 1.03 (ref 0.86–1.14)
LACTATE BLD-SCNC: 0.7 MMOL/L (ref 0.7–2.1)
LYMPHOCYTES # BLD AUTO: 1.1 10E9/L (ref 0.8–5.3)
LYMPHOCYTES # BLD AUTO: 1.2 10E9/L (ref 0.8–5.3)
LYMPHOCYTES NFR BLD AUTO: 11.4 %
LYMPHOCYTES NFR BLD AUTO: 12.3 %
MCH RBC QN AUTO: 29.4 PG (ref 26.5–33)
MCH RBC QN AUTO: 30 PG (ref 26.5–33)
MCHC RBC AUTO-ENTMCNC: 34.3 G/DL (ref 31.5–36.5)
MCHC RBC AUTO-ENTMCNC: 34.9 G/DL (ref 31.5–36.5)
MCV RBC AUTO: 86 FL (ref 78–100)
MCV RBC AUTO: 86 FL (ref 78–100)
MICRO REPORT STATUS: ABNORMAL
MONOCYTES # BLD AUTO: 0.9 10E9/L (ref 0–1.3)
MONOCYTES # BLD AUTO: 1 10E9/L (ref 0–1.3)
MONOCYTES NFR BLD AUTO: 10.5 %
MONOCYTES NFR BLD AUTO: 9.6 %
NEUTROPHILS # BLD AUTO: 7.2 10E9/L (ref 1.6–8.3)
NEUTROPHILS # BLD AUTO: 7.6 10E9/L (ref 1.6–8.3)
NEUTROPHILS NFR BLD AUTO: 76.1 %
NEUTROPHILS NFR BLD AUTO: 77.7 %
NRBC # BLD AUTO: 0 10*3/UL
NRBC BLD AUTO-RTO: 0 /100
PLATELET # BLD AUTO: 143 10E9/L (ref 150–450)
PLATELET # BLD AUTO: 152 10E9/L (ref 150–450)
POTASSIUM SERPL-SCNC: 4.2 MMOL/L (ref 3.4–5.3)
PROT SERPL-MCNC: 7.1 G/DL (ref 6.8–8.8)
RBC # BLD AUTO: 4.67 10E12/L (ref 4.4–5.9)
RBC # BLD AUTO: 4.76 10E12/L (ref 4.4–5.9)
SODIUM SERPL-SCNC: 136 MMOL/L (ref 133–144)
SPECIMEN EXP DATE BLD: NORMAL
SPECIMEN SOURCE: ABNORMAL
WBC # BLD AUTO: 9.4 10E9/L (ref 4–11)
WBC # BLD AUTO: 9.8 10E9/L (ref 4–11)

## 2017-05-17 PROCEDURE — 86140 C-REACTIVE PROTEIN: CPT | Performed by: PHYSICIAN ASSISTANT

## 2017-05-17 PROCEDURE — 25000128 H RX IP 250 OP 636: Performed by: ANESTHESIOLOGY

## 2017-05-17 PROCEDURE — 25000128 H RX IP 250 OP 636: Performed by: EMERGENCY MEDICINE

## 2017-05-17 PROCEDURE — S0020 INJECTION, BUPIVICAINE HYDRO: HCPCS | Performed by: PLASTIC SURGERY

## 2017-05-17 PROCEDURE — 83605 ASSAY OF LACTIC ACID: CPT | Performed by: EMERGENCY MEDICINE

## 2017-05-17 PROCEDURE — 96361 HYDRATE IV INFUSION ADD-ON: CPT

## 2017-05-17 PROCEDURE — 36415 COLL VENOUS BLD VENIPUNCTURE: CPT

## 2017-05-17 PROCEDURE — 80053 COMPREHEN METABOLIC PANEL: CPT | Performed by: EMERGENCY MEDICINE

## 2017-05-17 PROCEDURE — 25000125 ZZHC RX 250: Performed by: PLASTIC SURGERY

## 2017-05-17 PROCEDURE — 90792 PSYCH DIAG EVAL W/MED SRVCS: CPT | Performed by: NURSE PRACTITIONER

## 2017-05-17 PROCEDURE — 99223 1ST HOSP IP/OBS HIGH 75: CPT | Mod: AI | Performed by: INTERNAL MEDICINE

## 2017-05-17 PROCEDURE — 36415 COLL VENOUS BLD VENIPUNCTURE: CPT | Performed by: PHYSICIAN ASSISTANT

## 2017-05-17 PROCEDURE — 86901 BLOOD TYPING SEROLOGIC RH(D): CPT | Performed by: EMERGENCY MEDICINE

## 2017-05-17 PROCEDURE — 25000125 ZZHC RX 250: Performed by: NURSE ANESTHETIST, CERTIFIED REGISTERED

## 2017-05-17 PROCEDURE — 99285 EMERGENCY DEPT VISIT HI MDM: CPT | Mod: 25

## 2017-05-17 PROCEDURE — 25000128 H RX IP 250 OP 636: Performed by: NURSE ANESTHETIST, CERTIFIED REGISTERED

## 2017-05-17 PROCEDURE — 85652 RBC SED RATE AUTOMATED: CPT | Performed by: PHYSICIAN ASSISTANT

## 2017-05-17 PROCEDURE — 85025 COMPLETE CBC W/AUTO DIFF WBC: CPT | Performed by: PHYSICIAN ASSISTANT

## 2017-05-17 PROCEDURE — 36000052 ZZH SURGERY LEVEL 2 EA 15 ADDTL MIN: Performed by: PLASTIC SURGERY

## 2017-05-17 PROCEDURE — 37000008 ZZH ANESTHESIA TECHNICAL FEE, 1ST 30 MIN: Performed by: PLASTIC SURGERY

## 2017-05-17 PROCEDURE — 87205 SMEAR GRAM STAIN: CPT | Performed by: PLASTIC SURGERY

## 2017-05-17 PROCEDURE — 36000050 ZZH SURGERY LEVEL 2 1ST 30 MIN: Performed by: PLASTIC SURGERY

## 2017-05-17 PROCEDURE — 25000128 H RX IP 250 OP 636: Performed by: INTERNAL MEDICINE

## 2017-05-17 PROCEDURE — 37000009 ZZH ANESTHESIA TECHNICAL FEE, EACH ADDTL 15 MIN: Performed by: PLASTIC SURGERY

## 2017-05-17 PROCEDURE — 87186 SC STD MICRODIL/AGAR DIL: CPT | Performed by: PLASTIC SURGERY

## 2017-05-17 PROCEDURE — 25000132 ZZH RX MED GY IP 250 OP 250 PS 637: Performed by: PLASTIC SURGERY

## 2017-05-17 PROCEDURE — 40000169 ZZH STATISTIC PRE-PROCEDURE ASSESSMENT I: Performed by: PLASTIC SURGERY

## 2017-05-17 PROCEDURE — 87075 CULTR BACTERIA EXCEPT BLOOD: CPT | Performed by: PLASTIC SURGERY

## 2017-05-17 PROCEDURE — 25000128 H RX IP 250 OP 636

## 2017-05-17 PROCEDURE — 71000012 ZZH RECOVERY PHASE 1 LEVEL 1 FIRST HR: Performed by: PLASTIC SURGERY

## 2017-05-17 PROCEDURE — 99215 OFFICE O/P EST HI 40 MIN: CPT | Performed by: PHYSICIAN ASSISTANT

## 2017-05-17 PROCEDURE — 36415 COLL VENOUS BLD VENIPUNCTURE: CPT | Performed by: EMERGENCY MEDICINE

## 2017-05-17 PROCEDURE — 85025 COMPLETE CBC W/AUTO DIFF WBC: CPT | Performed by: EMERGENCY MEDICINE

## 2017-05-17 PROCEDURE — 12000007 ZZH R&B INTERMEDIATE

## 2017-05-17 PROCEDURE — 27210794 ZZH OR GENERAL SUPPLY STERILE: Performed by: PLASTIC SURGERY

## 2017-05-17 PROCEDURE — 85610 PROTHROMBIN TIME: CPT | Performed by: EMERGENCY MEDICINE

## 2017-05-17 PROCEDURE — 25000128 H RX IP 250 OP 636: Performed by: PLASTIC SURGERY

## 2017-05-17 PROCEDURE — 86900 BLOOD TYPING SEROLOGIC ABO: CPT | Performed by: EMERGENCY MEDICINE

## 2017-05-17 PROCEDURE — 96374 THER/PROPH/DIAG INJ IV PUSH: CPT

## 2017-05-17 PROCEDURE — 25000566 ZZH SEVOFLURANE, EA 15 MIN: Performed by: PLASTIC SURGERY

## 2017-05-17 PROCEDURE — 0J970ZZ DRAINAGE OF BACK SUBCUTANEOUS TISSUE AND FASCIA, OPEN APPROACH: ICD-10-PCS | Performed by: PLASTIC SURGERY

## 2017-05-17 PROCEDURE — 87070 CULTURE OTHR SPECIMN AEROBIC: CPT | Performed by: PLASTIC SURGERY

## 2017-05-17 PROCEDURE — 86850 RBC ANTIBODY SCREEN: CPT | Performed by: EMERGENCY MEDICINE

## 2017-05-17 PROCEDURE — 87040 BLOOD CULTURE FOR BACTERIA: CPT | Performed by: EMERGENCY MEDICINE

## 2017-05-17 PROCEDURE — 27210995 ZZH RX 272: Performed by: PLASTIC SURGERY

## 2017-05-17 PROCEDURE — 87077 CULTURE AEROBIC IDENTIFY: CPT | Performed by: PLASTIC SURGERY

## 2017-05-17 RX ORDER — ONDANSETRON 4 MG/1
4 TABLET, ORALLY DISINTEGRATING ORAL EVERY 6 HOURS PRN
Status: DISCONTINUED | OUTPATIENT
Start: 2017-05-17 | End: 2017-05-22 | Stop reason: HOSPADM

## 2017-05-17 RX ORDER — LIDOCAINE 40 MG/G
CREAM TOPICAL
Status: DISCONTINUED | OUTPATIENT
Start: 2017-05-17 | End: 2017-05-22 | Stop reason: HOSPADM

## 2017-05-17 RX ORDER — PROCHLORPERAZINE MALEATE 5 MG
5-10 TABLET ORAL EVERY 6 HOURS PRN
Status: DISCONTINUED | OUTPATIENT
Start: 2017-05-17 | End: 2017-05-22 | Stop reason: HOSPADM

## 2017-05-17 RX ORDER — CEFAZOLIN SODIUM 1 G/50ML
1250 SOLUTION INTRAVENOUS ONCE
Status: COMPLETED | OUTPATIENT
Start: 2017-05-17 | End: 2017-05-17

## 2017-05-17 RX ORDER — ONDANSETRON 2 MG/ML
4 INJECTION INTRAMUSCULAR; INTRAVENOUS EVERY 6 HOURS PRN
Status: DISCONTINUED | OUTPATIENT
Start: 2017-05-17 | End: 2017-05-22 | Stop reason: HOSPADM

## 2017-05-17 RX ORDER — HYDROMORPHONE HYDROCHLORIDE 1 MG/ML
.3-.5 INJECTION, SOLUTION INTRAMUSCULAR; INTRAVENOUS; SUBCUTANEOUS EVERY 5 MIN PRN
Status: DISCONTINUED | OUTPATIENT
Start: 2017-05-17 | End: 2017-05-17 | Stop reason: HOSPADM

## 2017-05-17 RX ORDER — PIPERACILLIN SODIUM, TAZOBACTAM SODIUM 3; .375 G/15ML; G/15ML
3.38 INJECTION, POWDER, LYOPHILIZED, FOR SOLUTION INTRAVENOUS ONCE
Status: COMPLETED | OUTPATIENT
Start: 2017-05-17 | End: 2017-05-17

## 2017-05-17 RX ORDER — NALOXONE HYDROCHLORIDE 0.4 MG/ML
.1-.4 INJECTION, SOLUTION INTRAMUSCULAR; INTRAVENOUS; SUBCUTANEOUS
Status: DISCONTINUED | OUTPATIENT
Start: 2017-05-17 | End: 2017-05-22 | Stop reason: HOSPADM

## 2017-05-17 RX ORDER — ONDANSETRON 2 MG/ML
4 INJECTION INTRAMUSCULAR; INTRAVENOUS EVERY 30 MIN PRN
Status: DISCONTINUED | OUTPATIENT
Start: 2017-05-17 | End: 2017-05-17 | Stop reason: HOSPADM

## 2017-05-17 RX ORDER — FENTANYL CITRATE 50 UG/ML
25-50 INJECTION, SOLUTION INTRAMUSCULAR; INTRAVENOUS
Status: DISCONTINUED | OUTPATIENT
Start: 2017-05-17 | End: 2017-05-17 | Stop reason: HOSPADM

## 2017-05-17 RX ORDER — GABAPENTIN 300 MG/1
CAPSULE ORAL
Qty: 90 CAPSULE | Refills: 1 | Status: SHIPPED | OUTPATIENT
Start: 2017-05-17 | End: 2017-08-28

## 2017-05-17 RX ORDER — PIPERACILLIN SODIUM, TAZOBACTAM SODIUM 2; .25 G/10ML; G/10ML
2.25 INJECTION, POWDER, LYOPHILIZED, FOR SOLUTION INTRAVENOUS EVERY 6 HOURS
Status: DISCONTINUED | OUTPATIENT
Start: 2017-05-17 | End: 2017-05-17

## 2017-05-17 RX ORDER — POTASSIUM CHLORIDE 7.45 MG/ML
10 INJECTION INTRAVENOUS
Status: DISCONTINUED | OUTPATIENT
Start: 2017-05-17 | End: 2017-05-22 | Stop reason: HOSPADM

## 2017-05-17 RX ORDER — OXYCODONE HYDROCHLORIDE 5 MG/1
5-10 TABLET ORAL
Status: DISCONTINUED | OUTPATIENT
Start: 2017-05-17 | End: 2017-05-22 | Stop reason: HOSPADM

## 2017-05-17 RX ORDER — ONDANSETRON 2 MG/ML
4 INJECTION INTRAMUSCULAR; INTRAVENOUS EVERY 6 HOURS PRN
Status: DISCONTINUED | OUTPATIENT
Start: 2017-05-17 | End: 2017-05-17

## 2017-05-17 RX ORDER — LIDOCAINE HYDROCHLORIDE 20 MG/ML
INJECTION, SOLUTION INFILTRATION; PERINEURAL PRN
Status: DISCONTINUED | OUTPATIENT
Start: 2017-05-17 | End: 2017-05-17

## 2017-05-17 RX ORDER — ONDANSETRON 4 MG/1
4 TABLET, ORALLY DISINTEGRATING ORAL EVERY 6 HOURS PRN
Status: DISCONTINUED | OUTPATIENT
Start: 2017-05-17 | End: 2017-05-17

## 2017-05-17 RX ORDER — ONDANSETRON 4 MG/1
4 TABLET, ORALLY DISINTEGRATING ORAL EVERY 30 MIN PRN
Status: DISCONTINUED | OUTPATIENT
Start: 2017-05-17 | End: 2017-05-17 | Stop reason: HOSPADM

## 2017-05-17 RX ORDER — GLYCOPYRROLATE 0.2 MG/ML
INJECTION, SOLUTION INTRAMUSCULAR; INTRAVENOUS PRN
Status: DISCONTINUED | OUTPATIENT
Start: 2017-05-17 | End: 2017-05-17

## 2017-05-17 RX ORDER — POTASSIUM CL/LIDO/0.9 % NACL 10MEQ/0.1L
10 INTRAVENOUS SOLUTION, PIGGYBACK (ML) INTRAVENOUS
Status: DISCONTINUED | OUTPATIENT
Start: 2017-05-17 | End: 2017-05-22 | Stop reason: HOSPADM

## 2017-05-17 RX ORDER — FENTANYL CITRATE 50 UG/ML
INJECTION, SOLUTION INTRAMUSCULAR; INTRAVENOUS PRN
Status: DISCONTINUED | OUTPATIENT
Start: 2017-05-17 | End: 2017-05-17

## 2017-05-17 RX ORDER — BUPIVACAINE HYDROCHLORIDE AND EPINEPHRINE 2.5; 5 MG/ML; UG/ML
INJECTION, SOLUTION INFILTRATION; PERINEURAL PRN
Status: DISCONTINUED | OUTPATIENT
Start: 2017-05-17 | End: 2017-05-17 | Stop reason: HOSPADM

## 2017-05-17 RX ORDER — SODIUM CHLORIDE, SODIUM LACTATE, POTASSIUM CHLORIDE, CALCIUM CHLORIDE 600; 310; 30; 20 MG/100ML; MG/100ML; MG/100ML; MG/100ML
INJECTION, SOLUTION INTRAVENOUS CONTINUOUS
Status: DISCONTINUED | OUTPATIENT
Start: 2017-05-17 | End: 2017-05-17 | Stop reason: HOSPADM

## 2017-05-17 RX ORDER — MORPHINE SULFATE 2 MG/ML
4-6 INJECTION, SOLUTION INTRAMUSCULAR; INTRAVENOUS
Status: DISCONTINUED | OUTPATIENT
Start: 2017-05-17 | End: 2017-05-22 | Stop reason: HOSPADM

## 2017-05-17 RX ORDER — METOCLOPRAMIDE 10 MG/1
10 TABLET ORAL EVERY 6 HOURS PRN
Status: DISCONTINUED | OUTPATIENT
Start: 2017-05-17 | End: 2017-05-22 | Stop reason: HOSPADM

## 2017-05-17 RX ORDER — PIPERACILLIN SODIUM, TAZOBACTAM SODIUM 3; .375 G/15ML; G/15ML
3.38 INJECTION, POWDER, LYOPHILIZED, FOR SOLUTION INTRAVENOUS EVERY 6 HOURS
Status: DISCONTINUED | OUTPATIENT
Start: 2017-05-17 | End: 2017-05-19

## 2017-05-17 RX ORDER — POTASSIUM CHLORIDE 1.5 G/1.58G
20-40 POWDER, FOR SOLUTION ORAL
Status: DISCONTINUED | OUTPATIENT
Start: 2017-05-17 | End: 2017-05-22 | Stop reason: HOSPADM

## 2017-05-17 RX ORDER — ACETAMINOPHEN 325 MG/1
975 TABLET ORAL EVERY 8 HOURS
Status: COMPLETED | OUTPATIENT
Start: 2017-05-17 | End: 2017-05-20

## 2017-05-17 RX ORDER — AMOXICILLIN 250 MG
1-2 CAPSULE ORAL 2 TIMES DAILY PRN
Status: DISCONTINUED | OUTPATIENT
Start: 2017-05-17 | End: 2017-05-22 | Stop reason: HOSPADM

## 2017-05-17 RX ORDER — GABAPENTIN 300 MG/1
300 CAPSULE ORAL 2 TIMES DAILY
Status: DISCONTINUED | OUTPATIENT
Start: 2017-05-17 | End: 2017-05-22 | Stop reason: HOSPADM

## 2017-05-17 RX ORDER — ACETAMINOPHEN 325 MG/1
650 TABLET ORAL EVERY 4 HOURS PRN
Status: DISCONTINUED | OUTPATIENT
Start: 2017-05-20 | End: 2017-05-22 | Stop reason: HOSPADM

## 2017-05-17 RX ORDER — NEOSTIGMINE METHYLSULFATE 1 MG/ML
VIAL (ML) INJECTION PRN
Status: DISCONTINUED | OUTPATIENT
Start: 2017-05-17 | End: 2017-05-17

## 2017-05-17 RX ORDER — POTASSIUM CHLORIDE 29.8 MG/ML
20 INJECTION INTRAVENOUS
Status: DISCONTINUED | OUTPATIENT
Start: 2017-05-17 | End: 2017-05-22 | Stop reason: HOSPADM

## 2017-05-17 RX ORDER — SODIUM CHLORIDE 9 MG/ML
INJECTION, SOLUTION INTRAVENOUS CONTINUOUS
Status: DISCONTINUED | OUTPATIENT
Start: 2017-05-17 | End: 2017-05-18

## 2017-05-17 RX ORDER — METOCLOPRAMIDE HYDROCHLORIDE 5 MG/ML
10 INJECTION INTRAMUSCULAR; INTRAVENOUS EVERY 6 HOURS PRN
Status: DISCONTINUED | OUTPATIENT
Start: 2017-05-17 | End: 2017-05-22 | Stop reason: HOSPADM

## 2017-05-17 RX ORDER — BUPIVACAINE HYDROCHLORIDE 2.5 MG/ML
INJECTION, SOLUTION INFILTRATION; PERINEURAL PRN
Status: DISCONTINUED | OUTPATIENT
Start: 2017-05-17 | End: 2017-05-17 | Stop reason: HOSPADM

## 2017-05-17 RX ORDER — POLYETHYLENE GLYCOL 3350 17 G/17G
17 POWDER, FOR SOLUTION ORAL DAILY PRN
Status: DISCONTINUED | OUTPATIENT
Start: 2017-05-17 | End: 2017-05-21

## 2017-05-17 RX ORDER — POTASSIUM CHLORIDE 1500 MG/1
20-40 TABLET, EXTENDED RELEASE ORAL
Status: DISCONTINUED | OUTPATIENT
Start: 2017-05-17 | End: 2017-05-22 | Stop reason: HOSPADM

## 2017-05-17 RX ORDER — NALOXONE HYDROCHLORIDE 0.4 MG/ML
.1-.4 INJECTION, SOLUTION INTRAMUSCULAR; INTRAVENOUS; SUBCUTANEOUS
Status: DISCONTINUED | OUTPATIENT
Start: 2017-05-17 | End: 2017-05-17

## 2017-05-17 RX ORDER — OXYCODONE HYDROCHLORIDE 5 MG/1
5-10 TABLET ORAL
Status: DISCONTINUED | OUTPATIENT
Start: 2017-05-17 | End: 2017-05-17

## 2017-05-17 RX ORDER — BISACODYL 10 MG
10 SUPPOSITORY, RECTAL RECTAL DAILY PRN
Status: DISCONTINUED | OUTPATIENT
Start: 2017-05-17 | End: 2017-05-22 | Stop reason: HOSPADM

## 2017-05-17 RX ORDER — ACETAMINOPHEN 325 MG/1
650 TABLET ORAL EVERY 4 HOURS PRN
Status: DISCONTINUED | OUTPATIENT
Start: 2017-05-17 | End: 2017-05-17

## 2017-05-17 RX ORDER — MORPHINE SULFATE 30 MG/1
30 TABLET, FILM COATED, EXTENDED RELEASE ORAL 2 TIMES DAILY PRN
Status: DISCONTINUED | OUTPATIENT
Start: 2017-05-17 | End: 2017-05-22 | Stop reason: HOSPADM

## 2017-05-17 RX ORDER — MEPERIDINE HYDROCHLORIDE 25 MG/ML
12.5 INJECTION INTRAMUSCULAR; INTRAVENOUS; SUBCUTANEOUS EVERY 5 MIN PRN
Status: DISCONTINUED | OUTPATIENT
Start: 2017-05-17 | End: 2017-05-17 | Stop reason: HOSPADM

## 2017-05-17 RX ORDER — SODIUM CHLORIDE 9 MG/ML
1000 INJECTION, SOLUTION INTRAVENOUS CONTINUOUS
Status: DISCONTINUED | OUTPATIENT
Start: 2017-05-17 | End: 2017-05-17

## 2017-05-17 RX ORDER — LIDOCAINE 40 MG/G
CREAM TOPICAL
Status: DISCONTINUED | OUTPATIENT
Start: 2017-05-17 | End: 2017-05-17

## 2017-05-17 RX ORDER — ACETAMINOPHEN 650 MG/1
650 SUPPOSITORY RECTAL EVERY 4 HOURS PRN
Status: DISCONTINUED | OUTPATIENT
Start: 2017-05-17 | End: 2017-05-22 | Stop reason: HOSPADM

## 2017-05-17 RX ORDER — PROPOFOL 10 MG/ML
INJECTION, EMULSION INTRAVENOUS PRN
Status: DISCONTINUED | OUTPATIENT
Start: 2017-05-17 | End: 2017-05-17

## 2017-05-17 RX ADMIN — SODIUM CHLORIDE 1000 ML: 9 INJECTION, SOLUTION INTRAVENOUS at 14:45

## 2017-05-17 RX ADMIN — VANCOMYCIN HYDROCHLORIDE 1250 MG: 5 INJECTION, POWDER, LYOPHILIZED, FOR SOLUTION INTRAVENOUS at 16:54

## 2017-05-17 RX ADMIN — PIPERACILLIN SODIUM,TAZOBACTAM SODIUM 3.38 G: 3; .375 INJECTION, POWDER, FOR SOLUTION INTRAVENOUS at 15:45

## 2017-05-17 RX ADMIN — GABAPENTIN 300 MG: 300 CAPSULE ORAL at 22:24

## 2017-05-17 RX ADMIN — FENTANYL CITRATE 50 MCG: 50 INJECTION, SOLUTION INTRAMUSCULAR; INTRAVENOUS at 17:52

## 2017-05-17 RX ADMIN — FENTANYL CITRATE 50 MCG: 50 INJECTION, SOLUTION INTRAMUSCULAR; INTRAVENOUS at 18:14

## 2017-05-17 RX ADMIN — OXYCODONE HYDROCHLORIDE 5 MG: 5 TABLET ORAL at 22:25

## 2017-05-17 RX ADMIN — GLYCOPYRROLATE 0.7 MG: 0.2 INJECTION, SOLUTION INTRAMUSCULAR; INTRAVENOUS at 17:58

## 2017-05-17 RX ADMIN — PROPOFOL 200 MG: 10 INJECTION, EMULSION INTRAVENOUS at 17:36

## 2017-05-17 RX ADMIN — HYDROMORPHONE HYDROCHLORIDE 0.5 MG: 1 INJECTION, SOLUTION INTRAMUSCULAR; INTRAVENOUS; SUBCUTANEOUS at 18:58

## 2017-05-17 RX ADMIN — NEOSTIGMINE METHYLSULFATE 4 MG: 1 INJECTION INTRAMUSCULAR; INTRAVENOUS; SUBCUTANEOUS at 17:58

## 2017-05-17 RX ADMIN — MIDAZOLAM HYDROCHLORIDE 2 MG: 1 INJECTION, SOLUTION INTRAMUSCULAR; INTRAVENOUS at 17:36

## 2017-05-17 RX ADMIN — FENTANYL CITRATE 100 MCG: 50 INJECTION, SOLUTION INTRAMUSCULAR; INTRAVENOUS at 17:36

## 2017-05-17 RX ADMIN — ROCURONIUM BROMIDE 30 MG: 10 INJECTION INTRAVENOUS at 17:36

## 2017-05-17 RX ADMIN — PIPERACILLIN SODIUM,TAZOBACTAM SODIUM 3.38 G: 3; .375 INJECTION, POWDER, FOR SOLUTION INTRAVENOUS at 22:26

## 2017-05-17 RX ADMIN — ACETAMINOPHEN 975 MG: 325 TABLET, FILM COATED ORAL at 22:24

## 2017-05-17 RX ADMIN — LIDOCAINE HYDROCHLORIDE 100 MG: 20 INJECTION, SOLUTION INFILTRATION; PERINEURAL at 17:36

## 2017-05-17 RX ADMIN — SODIUM CHLORIDE: 9 INJECTION, SOLUTION INTRAVENOUS at 22:24

## 2017-05-17 RX ADMIN — SODIUM CHLORIDE, POTASSIUM CHLORIDE, SODIUM LACTATE AND CALCIUM CHLORIDE: 600; 310; 30; 20 INJECTION, SOLUTION INTRAVENOUS at 17:32

## 2017-05-17 ASSESSMENT — ANXIETY QUESTIONNAIRES
1. FEELING NERVOUS, ANXIOUS, OR ON EDGE: NEARLY EVERY DAY
IF YOU CHECKED OFF ANY PROBLEMS ON THIS QUESTIONNAIRE, HOW DIFFICULT HAVE THESE PROBLEMS MADE IT FOR YOU TO DO YOUR WORK, TAKE CARE OF THINGS AT HOME, OR GET ALONG WITH OTHER PEOPLE: VERY DIFFICULT
5. BEING SO RESTLESS THAT IT IS HARD TO SIT STILL: MORE THAN HALF THE DAYS
GAD7 TOTAL SCORE: 11
7. FEELING AFRAID AS IF SOMETHING AWFUL MIGHT HAPPEN: MORE THAN HALF THE DAYS
3. WORRYING TOO MUCH ABOUT DIFFERENT THINGS: NOT AT ALL
2. NOT BEING ABLE TO STOP OR CONTROL WORRYING: NOT AT ALL
6. BECOMING EASILY ANNOYED OR IRRITABLE: NEARLY EVERY DAY

## 2017-05-17 ASSESSMENT — PAIN SCALES - GENERAL: PAINLEVEL: SEVERE PAIN (7)

## 2017-05-17 ASSESSMENT — ENCOUNTER SYMPTOMS
WOUND: 1
FEVER: 0
NUMBNESS: 0
WEAKNESS: 0
BACK PAIN: 0
NAUSEA: 0
COLOR CHANGE: 1
VOMITING: 0

## 2017-05-17 ASSESSMENT — LIFESTYLE VARIABLES: TOBACCO_USE: 1

## 2017-05-17 ASSESSMENT — PATIENT HEALTH QUESTIONNAIRE - PHQ9: 5. POOR APPETITE OR OVEREATING: SEVERAL DAYS

## 2017-05-17 NOTE — PROGRESS NOTES
Plastic Surgery    Unusual course after microdiscectomy late last year with persistent/recurrent hematoma at surgical site.  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  He again had recurrent seromas and after a drain was placed last week he developed severe redness and infection.  Was seen 2 days ago with early signs of infection.  This has worsened despite initial trial of Keflex.  Cultures from 2 days ago now showing Staph and Acinetobacter.    Admitted today for surgical washout and initiation of IV abx - Vanco and Zosyn.  Will have ID see as well.  Will re-culture in OR.    Discussed plan with patient and he is agreeable to proceed.  Will leave wound open or place wound vac depending on findings.

## 2017-05-17 NOTE — Clinical Note
Devin Sims, Thank you for the Psychiatry referral to the [facility] ([facility]). Our psychiatry providers act as a specialty service for Primary Care Providers in the [facility] that seek to optimize medications for unstable patients.  Once medications have been optimized, our providers discharge the patient back to the referring Primary Care Provider for ongoing medication management.  This type of system allows our providers to serve a high volume of patients.   Please see my Impression and Plan.  If you have any questions or concerns, please let me know.  Shirley
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
## 2017-05-17 NOTE — ED NOTES
"[facility]  ED Nurse Handoff Report    ED Chief complaint: Wound Check (s/p back surgery - pt sent to ER by PMD for antibiotics for wound infection )      ED Diagnosis:   Final diagnoses:   Cellulitis       Code Status: Full Code    Allergies: No Known Allergies    Activity level - Baseline/Home:  Independent    Activity Level - Current:   Independent     Needed?: No    Isolation: No  Infection: Not Applicable    Bariatric?: No    Vital Signs:   Vitals:    05/17/17 1342 05/17/17 1521 05/17/17 1522   BP: 123/74     Pulse: 110     Resp: 16     Temp: 100.2  F (37.9  C)     TempSrc: Oral     SpO2: 100% 98% 99%   Weight: 74.8 kg (165 lb)     Height: 1.702 m (5' 7\")         Cardiac Rhythm: ,        Pain level: 0-10 Pain Scale: 7    Is this patient confused?: No    Patient Report: Initial Complaint: post op wound check  Focused Assessment: On 1/3/2017 the patient had a lumbar discectomy, and since then has had recurrent seromas. These have been incised and drained multiple times. One week ago, the patient had a drain placed in this area, which was removed today in clinic. Clinic noted that the area is very warm, red and swollen, with redness across the back and radiating into the front. He has not had any fevers or increased pain today, no numbness or weakness in the lower extremities. To note, he is taking keflex, and has been NPO since this morning.  Tests Performed: labs  Abnormal Results: lactic nml, other labs nml, BCx2 sent  Treatments provided: IV fluids, IV antibiotics    Family Comments: not in ED    OBS brochure/video discussed/provided to patient: N/A    ED Medications:   Medications   0.9% sodium chloride BOLUS (1,000 mLs Intravenous New Bag 5/17/17 2209)     Followed by   0.9% sodium chloride infusion (not administered)   piperacillin-tazobactam (ZOSYN) 3.375 g vial to attach to  mL bag (not administered)   vancomycin 1250 mg in 0.9% NaCl 250 mL PREMIX (not administered) "       Drips infusing?:  Yes      ED NURSE PHONE NUMBER: [phone]

## 2017-05-17 NOTE — ED PROVIDER NOTES
History     Chief Complaint:  Wound Check    HPI   Ko Bell is a 37 year old male who presents from clinic for evaluation of a seroma. On 1/3/2017 the patient had a lumbar discectomy, and since then has had recurrent seromas. These have been incised and drained multiple times. One week ago, the patient had a drain placed in this area, which was removed a few days ago in clinic. Cultures obtained at that time growing G+ and G- species. Sent in from  after clinic noted that the area is very warm, red and swollen, with redness across the back and radiating into the front. He has not had any fevers or increased pain today, no numbness or weakness in the lower extremities. To note, he is taking keflex, levaquin and has been NPO since this morning. Urinating normally. Having normal bowel movements. Fever a few days ago.    Allergies:  No Known Drug Allergies      Medications:    Neurontin  Oxycodone  Morphine  Flexeril    Past Medical History:    Bipolar disorder  Cocaine abuse    Past Surgical History:    Discectomy  Excise lesion trunk  I and D spine  Orthopedic surgery  Sinus surgery  [place] teeth    Family History:    Skin cancer  Substance abuse  Depression    Social History:  The patient presented to the ED alone.   Smoking Status: Current some day smoker  Smokeless Tobacco: No  Alcohol Use: No   Marital Status:   [4]     Review of Systems   Constitutional: Negative for fever.   Gastrointestinal: Negative for nausea and vomiting.   Musculoskeletal: Negative for back pain.   Skin: Positive for color change and wound.   Neurological: Negative for weakness and numbness.   All other systems reviewed and are negative.    Physical Exam   Vitals:  Patient Vitals for the past 24 hrs:   BP Temp Temp src Pulse Resp SpO2 Height Weight   05/17/17 1546 - - - - - 100 % - -   05/17/17 1531 - - - - - 99 % - -   05/17/17 1530 110/71 - - - - - - -   05/17/17 1522 - - - - - 99 % - -   05/17/17 1521 - - - - - 98 %  "- -   05/17/17 1342 123/74 100.2  F (37.9  C) Oral 110 16 100 % 1.702 m (5' 7\") 74.8 kg (165 lb)     Physical Exam  General: Resting comfortably on the gurney  Eyes:  The pupils are equal and round  ENT:    Moist mucous membranes  Neck:  Normal range of motion  CV:  Tachycardic rate and rhythm    Skin warm and well perfused   Resp:  Lungs are clear    Non-labored    No rales    No wheezing   GI:  Abdomen is soft, there is no rigidity    No distension    No rebound tenderness    No abdominal tenderness   MS:  Normal muscular tone    Moving lower extremities normally  Skin:  Fullness at midline low back incision    Erythema and warmth across entire low back  Neuro:   Awake, alert.      Speech is normal and fluent.    Face is symmetric.     Moves all extremities  Psych:  Normal affect.  Appropriate interactions.  Emergency Department Course     Laboratory:  Laboratory findings were communicated with the patient who voiced understanding of the findings.  CBC: PLT: 143 (L) o/w WNL. (WBC 9.8, HGB 14.0)   CMP: Albumin: 3.1 (L) o/w WNL (Creatinine 0.76)  Lactic Acid: 0.7  INR: 1.03  Blood Cultures: Pending    Interventions:  1445 Normal Saline 1000 mL IV   1545 Zosyn 3.375 g IV     Emergency Department Course:  Nursing notes and vitals reviewed.  I performed an exam of the patient as documented above.   IV was inserted and blood was drawn for laboratory testing, results above.   2:44 PM: I spoke with Dr. Palma of [facility] regarding patient's presentation, findings, and plan of care.     I discussed the treatment plan with the patient. They expressed understanding of this plan and consented to admission. I discussed the patient with Dr. Adorno, who will admit the patient to a monitored bed for further evaluation and treatment.    I personally reviewed the laboratory results with the Patient and answered all related questions prior to admission.     Impression & Plan      Medical Decision Making:  Ko ERWIN" Arabella is a 37 year old male who presents to the emergency department today with wound check. He was sent in for IV antibiotics and surgery. He is initially mildly tachycardic and elevated temp in the ED at 100.2. There is erythema across the lower back consistent with cellulitis and I am concerned there could be an abscess near his incision as well. I called Dr. Palma from [facility] and he will be taking the patient to the OR this evening. Recommended IV antibiotics including zosyn and vancomycin. Dr. Adorno will admit the patient to the hospital. Attempted to get the culture results from a few days ago in clinic as well. Patient updated on the plan.     Diagnosis:    ICD-10-CM    1. Cellulitis L03.90 ABO/Rh type and screen      Disposition:   Admission    Scribe Disclosure:  I, Nestor Carmona, am serving as a scribe at 2:33 PM on 5/17/2017 to document services personally performed by Paty Vides MD, based on my observations and the provider's statements to me.   5/17/2017    EMERGENCY DEPARTMENT       Paty Vides MD  05/17/17 4007
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
## 2017-05-17 NOTE — MR AVS SNAPSHOT
After Visit Summary   5/17/2017    Ko Bell    MRN: [id number]           Patient Information     Date Of Birth          1980        Visit Information        Provider Department      5/17/2017 12:40 PM Bethany James PA-C [facility]        Today's Diagnoses     Postoperative seroma of musculoskeletal structure after musculoskeletal procedure    -  1    Postoperative infection, subsequent encounter           Follow-ups after your visit        Future tests that were ordered for you today     Open Standing Orders        Priority Remaining Interval Expires Ordered    Oxygen: Nasal cannula, Oxygen mask STAT 33730/42008 CONTINUOUS  5/17/2017            Who to contact     If you have questions or need follow up information about today's clinic visit or your schedule please contact [facility] directly at [phone].  Normal or non-critical lab and imaging results will be communicated to you by Responsyshart, letter or phone within 4 business days after the clinic has received the results. If you do not hear from us within 7 days, please contact the clinic through Responsyshart or phone. If you have a critical or abnormal lab result, we will notify you by phone as soon as possible.  Submit refill requests through Daio or call your pharmacy and they will forward the refill request to us. Please allow 3 business days for your refill to be completed.          Additional Information About Your Visit        Responsyshart Information     Daio gives you secure access to your electronic health record. If you see a primary care provider, you can also send messages to your care team and make appointments. If you have questions, please call your primary care clinic.  If you do not have a primary care provider, please call [phone] and they will assist you.        Care EveryWhere ID     This is your Care EveryWhere ID. This could be used by other organizations to access your  "[facility] medical records  [id number]        Your Vitals Were     Pulse Temperature Height Pulse Oximetry BMI (Body Mass Index)       95 98.6  F (37  C) 5' 7\" (1.702 m) 99% 25.37 kg/m2        Blood Pressure from Last 3 Encounters:   05/17/17 123/74   05/17/17 110/60   05/15/17 122/84    Weight from Last 3 Encounters:   05/17/17 165 lb (74.8 kg)   05/17/17 162 lb (73.5 kg)   05/17/17 162 lb (73.5 kg)              We Performed the Following     CBC with platelets and differential     CRP, inflammation     ESR: Erythrocyte sedimentation rate          Today's Medication Changes          These changes are accurate as of: 5/17/17  2:10 PM.  If you have any questions, ask your nurse or doctor.               Start taking these medicines.        Dose/Directions    gabapentin 300 MG capsule   Commonly known as:  NEURONTIN   Used for:  Mood disorder (H)   Started by:  Shirley Wilkinson NP        Take 1 capsule (300 mg) for 5 days, then increase to 2 capsules (600 mg). For sleep, pain, anger, mood, anxiety.   Quantity:  90 capsule   Refills:  1            Where to get your medicines      These medications were sent to [facility] [place] - [place] [address]     Phone:  [phone]     gabapentin 300 MG capsule                Primary Care Provider Office Phone # Fax #    Bethany James PA-C [phone]       [place] [address]        Thank you!     Thank you for choosing [facility]  for your care. Our goal is always to provide you with excellent care. Hearing back from our patients is one way we can continue to improve our services. Please take a few minutes to complete the written survey that you may receive in the mail after your visit with us. Thank you!             Your Updated Medication List - Protect others around you: Learn how to safely use, store and throw " away your medicines at www.disposemymeds.org.          This list is accurate as of: 5/17/17  2:10 PM.  Always use your most recent med list.                   Brand Name Dispense Instructions for use    cyclobenzaprine 10 MG tablet    FLEXERIL    90 tablet    Take 1 tablet (10 mg) by mouth 3 times daily as needed for muscle spasms       gabapentin 300 MG capsule    NEURONTIN    90 capsule    Take 1 capsule (300 mg) for 5 days, then increase to 2 capsules (600 mg). For sleep, pain, anger, mood, anxiety.       morphine 30 MG 12 hr tablet    MS CONTIN    60 tablet    Take 1 tablet (30 mg) by mouth every 12 hours maximum 2 tablet(s) per day       oxyCODONE 5 MG IR tablet    ROXICODONE    75 tablet    Take 1 tablet (5 mg) by mouth every 4 hours as needed for pain maximum 6 tablet(s) per day

## 2017-05-17 NOTE — ANESTHESIA CARE TRANSFER NOTE
Patient: Ko Bell    Procedure(s):  IRRIGATION AND DRAINAGE OF BACK WOUND - Wound Class: II-Clean Contaminated    Diagnosis: Unknown  Diagnosis Additional Information: No value filed.    Anesthesia Type:   General, ETT     Note:  Airway :Nasal Cannula  Patient transferred to:PACU  Comments: Patient breathing spontaneously.  Follows commands.  Suctioned and extubated.  Exchanging air well.  Transferred to PACU with 2L O2 via NC.  Monitors on.  VSS.  Patent IV.  Report and transfer of care to RN.        Vitals: (Last set prior to Anesthesia Care Transfer)    CRNA VITALS  5/17/2017 1741 - 5/17/2017 1818      5/17/2017             Pulse: 95    SpO2: 98 %    Resp Rate (observed): 30                Electronically Signed By: MARTHA Mccullough CRNA  May 17, 2017  6:18 PM
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
## 2017-05-17 NOTE — IP AVS SNAPSHOT
MRN:[id number]                      After Visit Summary   5/17/2017    Ko Bell    MRN: [id number]           Thank you!     Thank you for choosing [facility] for your care. Our goal is always to provide you with excellent care. Hearing back from our patients is one way we can continue to improve our services. Please take a few minutes to complete the written survey that you may receive in the mail after you visit with us. Thank you!        Patient Information     Date Of Birth          1980        Designated Caregiver       Most Recent Value    Caregiver    Will someone help with your care after discharge? no      About your hospital stay     You were admitted on:  May 17, 2017 You last received care in the:  [place] [facility] [address] [facility]    You were discharged on:  May 22, 2017        Reason for your hospital stay       Back wound infection            Reason for your hospital stay       Further management of an infected back wound.                  Who to Call     For medical emergencies, please call 911.  For non-urgent questions about your medical care, please call your primary care provider or clinic, [phone]  For questions related to your surgery, please call your surgery clinic        Attending Provider     Provider Specialty    Paty Vides MD Emergency Medicine    Roberts ChapelDarrin MD Internal Medicine    Wayne Palma MD Plastic Surgery       Primary Care Provider Office Phone # Fax #    Bethany James PA-C [phone]       [address] [address]        After Care Instructions     Activity       Your activity upon discharge: activity as tolerated and no operation of machinery while taking narcotics.            Diet       Follow this diet upon discharge: Orders Placed This Encounter      Combination Diet Regular Diet Adult            Wound care and dressings        Instructions to care for your wound at home: wound vac to -125 mm hg change 3 times a week ( may skip Memorial Day and be seen with Dr. Palma on May 31st for change) for infected seroma, to assist in granulation tissue formation.    You are having the [facility] do the wound vac changes, starting this Wednesday, May 24th at 9am. The address of the clinic is [address] [place] [address]. Go to Hospital building, 1st floor stop at patient registration and the [facility] is right next to it. .  The wound clinic phone number is [phone]. BRING WOUND VAC SUPPLIES TO ALL APPOINTMENTS.    Plan of care for wound located on midlumbar spine: [employer] Wound VAC dressing changes as above.  Use liquid 4% lidocaine prior to dressing change, in the wound bed.                  Follow-up Appointments     Follow-up and recommended labs and tests        7-10 days w/ Dr. Palma.  Call [phone].   Daily dressing changes as instructed.    You have a follow up appointment scheduled for Wednesday, May 31 at 11:15 with Dr. Palma in [place]. The office phone number is [phone]. BRING WOUND VAC SUPPLIES TO APPOINTMENT.            Follow-up and recommended labs and tests        Follow up with primary care provider, Bethany James, within 7 days for hospital follow- up.  No follow up labs or test are needed.  Follow up with wound clinic as previously scheduled.                  Further instructions from your care team       Next week Wednesday w/ Dr. Palma.  Call [phone].  Wound care as instructed.    Going Home with A Wound Vac  Usually your doctor, home health agency or wound care clinic trained in VAC therapy will change your dressing. There are specific dressings that need to be used for your VAC dressing change. There are also specific settings that need to be set on the machine at discharge.   When you are discharged, make sure you bring along the box that has been left in your  room from the [facility] representative. (You need to leave with a portable VAC machine, not the hospital s.)  **Dressings will be changed according to what your MD has ordered. It is usually 3 times/week or as needed.  **You should not shower ( or get dressing wet) unless this has been approved by your MD.  **Keep the machine plugged in as much as possible to prevent having to recharge a full 12 hours. The battery may run for about 12 hours.    **At home: If there is a problem and the machine registers  air leak , it usually means the dressing is loose.  You may try to patch the dressing with new Tegaderm or clear KCI drape. If this does not work, and Home Care is following the patient, call the Home Care nurse.  If the machine malfunctions, read the manual and/or call [facility] at #[phone]  How to Change the Canister on the Wound VAC  1. Close white clamp on foam dressing tube.  2. Disconnect tubes from each other - hold canister tube up to allow drainage to flow down tube and into canister. Close clamp on canister tube.  3. Press therapy button to off.  4. Push in canister release button at front of machine and remove canister from machine. You may need to jiggle it out.  5. Insert a new canister into machine. Jiggle into place until you hear a click.  6. Hook tubes together, unclamp clamps.  7. Press therapy button; press therapy  on  to restart.      ** If you have any serious bleeding, feel faint or feel dizzy, turn off the machine and call 911.  **You may also need to call your MD for a fever over 102, increase in redness, swelling, bleeding, bad swell, increase in pain warmth around dressing site.  **If you have a question or problem with the bandage call your MD, home care nurse or wound care center.  **If your machine isn t working right and the alarms keep going off: Read about  alarms  in your manual and/or call [facility] at [phone].    Follow up Appointment:  __Next Wednesday with   "Louie__________________    Doctor's Phone Number: ____________________            Pending Results     Date and Time Order Name Status Description    5/17/2017 1755 Anaerobic bacterial culture Preliminary     5/17/2017 1448 Blood culture Preliminary     5/17/2017 1432 Blood culture Preliminary             Statement of Approval     Ordered          05/22/17 1220  I have reviewed and agree with all the recommendations and orders detailed in this document.  EFFECTIVE NOW     Approved and electronically signed by:  Dipika Morgan MD             Admission Information     Date & Time Provider Department Dept. Phone    5/17/2017 Wayne Palma MD [place] [address] [facility] [phone]      Your Vitals Were     Blood Pressure Pulse Temperature Respirations Height Weight    123/67 (BP Location: Right arm) 75 98  F (36.7  C) (Oral) 16 1.702 m (5' 7\") 74.8 kg (165 lb)    Pulse Oximetry BMI (Body Mass Index)                97% 25.84 kg/m2          [email or web address] Information     [employer] gives you secure access to your electronic health record. If you see a primary care provider, you can also send messages to your care team and make appointments. If you have questions, please call your primary care clinic.  If you do not have a primary care provider, please call [phone] and they will assist you.        Care EveryWhere ID     This is your Care EveryWhere ID. This could be used by other organizations to access your [facility] medical records  [id number]           Review of your medicines      START taking        Dose / Directions    lidocaine 4 % solution   Commonly known as:  XYLOCAINE        Apply topically every 4 hours as needed for moderate pain   Quantity:  50 mL   Refills:  0       nicotine 14 MG/24HR 24 hr patch   Commonly known as:  NICODERM CQ   Used for:  Tobacco use disorder        Dose:  1 patch   Place 1 patch onto the skin every 24 hours   Quantity:  30 patch   Refills:  0       " oxyCODONE HCl 5 MG Taba   Commonly known as:  OXECTA   Indication:  Acute Pain   Replaces:  OXYCODONE HCL PO        Dose:  5-10 mg   Take 5-10 mg by mouth every 4 hours as needed   Quantity:  50 each   Refills:  0       sulfamethoxazole-trimethoprim 400-80 MG per tablet   Commonly known as:  BACTRIM/SEPTRA        Dose:  1 tablet   Take 1 tablet by mouth 2 times daily   Quantity:  20 tablet   Refills:  0         CONTINUE these medicines which have NOT CHANGED        Dose / Directions    gabapentin 300 MG capsule   Commonly known as:  NEURONTIN   Used for:  Mood disorder (H)        Take 1 capsule (300 mg) for 5 days, then increase to 2 capsules (600 mg). For sleep, pain, anger, mood, anxiety.   Quantity:  90 capsule   Refills:  1       MS CONTIN PO        Dose:  30 mg   Take 30 mg by mouth 2 times daily as needed for moderate to severe pain   Refills:  0         STOP taking     CEPHALEXIN PO           CLINDAMYCIN HCL PO           LEVOFLOXACIN PO           OXYCODONE HCL PO   Replaced by:  oxyCODONE HCl 5 MG Taba                Where to get your medicines      These medications were sent to [address] [address]     Phone:  [phone]     lidocaine 4 % solution    nicotine 14 MG/24HR 24 hr patch    sulfamethoxazole-trimethoprim 400-80 MG per tablet         Some of these will need a paper prescription and others can be bought over the counter. Ask your nurse if you have questions.     Bring a paper prescription for each of these medications     oxyCODONE HCl 5 MG Taba                Protect others around you: Learn how to safely use, store and throw away your medicines at www.disposemymeds.org.             Medication List: This is a list of all your medications and when to take them. Check marks below indicate your daily home schedule. Keep this list as a reference.      Medications           Morning Afternoon Evening  Bedtime As Needed    gabapentin 300 MG capsule   Commonly known as:  NEURONTIN   Take 1 capsule (300 mg) for 5 days, then increase to 2 capsules (600 mg). For sleep, pain, anger, mood, anxiety.   Last time this was given:  300 mg on 5/22/2017  8:25 AM                                   lidocaine 4 % solution   Commonly known as:  XYLOCAINE   Apply topically every 4 hours as needed for moderate pain   Last time this was given:  40 mLs on 5/22/2017 12:05 PM                                MS CONTIN PO   Take 30 mg by mouth 2 times daily as needed for moderate to severe pain                                   nicotine 14 MG/24HR 24 hr patch   Commonly known as:  NICODERM CQ   Place 1 patch onto the skin every 24 hours   Last time this was given:  1 patch on 5/22/2017  8:25 AM                                oxyCODONE HCl 5 MG Taba   Commonly known as:  OXECTA   Take 5-10 mg by mouth every 4 hours as needed                                   sulfamethoxazole-trimethoprim 400-80 MG per tablet   Commonly known as:  BACTRIM/SEPTRA   Take 1 tablet by mouth 2 times daily

## 2017-05-17 NOTE — IP AVS SNAPSHOT
[facility] [facility] [address] [facility]    [address] [place] [address]    Phone:  [phone]                                       After Visit Summary   5/17/2017    Ko Bell    MRN: [id number]           After Visit Summary Signature Page     I have received my discharge instructions, and my questions have been answered. I have discussed any challenges I see with this plan with the nurse or doctor.    ..........................................................................................................................................  Patient/Patient Representative Signature      ..........................................................................................................................................  Patient Representative Print Name and Relationship to Patient    ..................................................               ................................................  Date                                            Time    ..........................................................................................................................................  Reviewed by Signature/Title    ...................................................              ..............................................  Date                                                            Time

## 2017-05-17 NOTE — MR AVS SNAPSHOT
After Visit Summary   5/17/2017    Ko Bell    MRN: [id number]           Patient Information     Date Of Birth          1980        Visit Information        Provider Department      5/17/2017 9:15 AM Shirley Wilkinson NP [facility]        Today's Diagnoses     Tobacco use disorder    -  1    Mood disorder (H)          Care Instructions    Treatment Plan:    Start Neurontin (gabapentin) 300 mg by mouth daily at bedtime for 5 days, then increase to 600 mg by mouth daily at bedtime. For sleep, anxiety, mood, anger, pain.     Continue all other medications as reviewed per electronic medical record today.     All questions addressed. Education and counseling completed regarding risks and benefits of medications and psychotherapy options.    Safety plan was reviewed. To the Emergency Department as needed or call after hours crisis line at [phone] or [phone].     To schedule individual or family therapy, call [facility] at [phone].     Schedule an appointment with me in 4-6 weeks or sooner as needed.  Call [facility] at [phone] to schedule.    Follow up with primary care provider as planned or for acute medical concerns.    Call the psychiatric nurse line with medication questions or concerns at [phone].    My Practice Policy was reviewed and signed: YES     MyChart may be used to communicate with your provider, but this is not intended to be used for emergencies.        Follow-ups after your visit        Follow-up notes from your care team     Return in about 4 weeks (around 6/14/2017).      Who to contact     If you have questions or need follow up information about today's clinic visit or your schedule please contact [facility] directly at [phone].  Normal or non-critical lab and imaging results will be communicated to you by MyChart, letter or phone within 4 business days after the clinic has  "received the results. If you do not hear from us within 7 days, please contact the clinic through [employer] or phone. If you have a critical or abnormal lab result, we will notify you by phone as soon as possible.  Submit refill requests through [employer] or call your pharmacy and they will forward the refill request to us. Please allow 3 business days for your refill to be completed.          Additional Information About Your Visit        [employer] Information     [employer] gives you secure access to your electronic health record. If you see a primary care provider, you can also send messages to your care team and make appointments. If you have questions, please call your primary care clinic.  If you do not have a primary care provider, please call [phone] and they will assist you.        Care EveryWhere ID     This is your Care EveryWhere ID. This could be used by other organizations to access your [facility] medical records  [id number]        Your Vitals Were     Pulse Temperature Height Pulse Oximetry BMI (Body Mass Index)       114 98.7  F (37.1  C) (Oral) 5' 7\" (1.702 m) 99% 25.37 kg/m2        Blood Pressure from Last 3 Encounters:   05/17/17 110/60   05/15/17 122/84   04/25/17 115/78    Weight from Last 3 Encounters:   05/17/17 162 lb (73.5 kg)   05/15/17 161 lb 2 oz (73.1 kg)   04/25/17 159 lb 3.2 oz (72.2 kg)              Today, you had the following     No orders found for display         Today's Medication Changes          These changes are accurate as of: 5/17/17 10:18 AM.  If you have any questions, ask your nurse or doctor.               Start taking these medicines.        Dose/Directions    gabapentin 300 MG capsule   Commonly known as:  NEURONTIN   Used for:  Mood disorder (H)   Started by:  Shirley Wilkinson NP        Take 1 capsule (300 mg) for 5 days, then increase to 2 capsules (600 mg). For sleep, pain, anger, mood, anxiety.   Quantity:  90 capsule   Refills:  1            Where to get your " medicines      These medications were sent to [facility] - [place], [place] - [address]  [address], [facility] [address]     Phone:  [phone]     gabapentin 300 MG capsule                Primary Care Provider Office Phone # Fax #    Bethany James PA-C [phone]       [facility] [address] [place] [address]        Thank you!     Thank you for choosing [facility]  for your care. Our goal is always to provide you with excellent care. Hearing back from our patients is one way we can continue to improve our services. Please take a few minutes to complete the written survey that you may receive in the mail after your visit with us. Thank you!             Your Updated Medication List - Protect others around you: Learn how to safely use, store and throw away your medicines at www.disposemymeds.org.          This list is accurate as of: 5/17/17 10:18 AM.  Always use your most recent med list.                   Brand Name Dispense Instructions for use    cyclobenzaprine 10 MG tablet    FLEXERIL    90 tablet    Take 1 tablet (10 mg) by mouth 3 times daily as needed for muscle spasms       gabapentin 300 MG capsule    NEURONTIN    90 capsule    Take 1 capsule (300 mg) for 5 days, then increase to 2 capsules (600 mg). For sleep, pain, anger, mood, anxiety.       morphine 30 MG 12 hr tablet    MS CONTIN    60 tablet    Take 1 tablet (30 mg) by mouth every 12 hours maximum 2 tablet(s) per day       oxyCODONE 5 MG IR tablet    ROXICODONE    75 tablet    Take 1 tablet (5 mg) by mouth every 4 hours as needed for pain maximum 6 tablet(s) per day

## 2017-05-17 NOTE — PROGRESS NOTES
POST OPERATIVE NOTE-IMMEDIATE :    Preoperative Diagnosis:  Wound infection lower back    Postoperative Diagnosis:  Same    Procedures:  Procedure(s):  IRRIGATION AND DRAINAGE OF BACK WOUND - Wound Class: II-Clean Contaminated    Surgeon(s) and Assistants (if any):  Surgeon(s):  Wayne Palma MD    EBL:  20 mL    Anesthesia:  General    Complications: None    Specimen:    ID Type Source Tests Collected by Time Destination   1 : LEFT BACK WOUND Wound Back ANAEROBIC BACTERIAL CULTURE, GRAM STAIN, WOUND CULTURE AEROBIC BACTERIAL Wayne Palma MD 5/17/2017  5:52 PM        Findings:  Approx 30cc of milky purulent fluid.  Sent for culture.  Fascia not undermining beyond surgically created space.  Packed open after irrigation and hemostasis.    Condition on discharge from OR:  Satisfactory    Wayne Palma

## 2017-05-17 NOTE — NURSING NOTE
"Chief Complaint   Patient presents with     Consult     Referred by Bethany James-seen in past by [facility] Zun[facility] psych, pt here to discuss medication for Bipolar off medication for years, used depakote and Seroquel in the past       Initial /60 (BP Location: Right arm, Patient Position: Chair, Cuff Size: Adult Regular)  Pulse 114  Temp 98.7  F (37.1  C) (Oral)  Ht 5' 7\" (1.702 m)  Wt 162 lb (73.5 kg)  SpO2 99%  BMI 25.37 kg/m2 Estimated body mass index is 25.37 kg/(m^2) as calculated from the following:    Height as of this encounter: 5' 7\" (1.702 m).    Weight as of this encounter: 162 lb (73.5 kg).  Medication Reconciliation: complete    "

## 2017-05-17 NOTE — PHARMACY-ADMISSION MEDICATION HISTORY
Admission medication history interview status for the 5/17/2017  admission is complete. See EPIC admission navigator for prior to admission medications     Medication history source reliability:Moderate    Actions taken by pharmacist (provider contacted, etc):confirmed new rx picked up from [facility] (not started gabapentin, clindamycin, levofloxacin yet)     Additional medication history information not noted on PTA med list :None    Medication reconciliation/reorder completed by provider prior to medication history? No    Time spent in this activity: 25 minutes    Prior to Admission medications    Medication Sig Last Dose Taking? Auth Provider   Morphine Sulfate (MS CONTIN PO) Take 30 mg by mouth 2 times daily as needed for moderate to severe pain 5/14/2017 Yes Unknown, Entered By History   OXYCODONE HCL PO Take 10 mg by mouth 2 times daily Am and hs 5/17/2017 at 0700 Yes Unknown, Entered By History   OXYCODONE HCL PO Take 5 mg by mouth every 24 hours At lunch  Yes Unknown, Entered By History   OXYCODONE HCL PO Take 5-10 mg by mouth daily as needed  Yes Unknown, Entered By History   CEPHALEXIN PO Take 500 mg by mouth 4 times daily 5/17/2017 at am Yes Unknown, Entered By History   gabapentin (NEURONTIN) 300 MG capsule Take 1 capsule (300 mg) for 5 days, then increase to 2 capsules (600 mg). For sleep, pain, anger, mood, anxiety. yet to start  Shirley Wilkinson NP   CLINDAMYCIN HCL PO Take 300 mg by mouth 3 times daily For ten days, yet to start   Unknown, Entered By History   LEVOFLOXACIN PO Take 500 mg by mouth daily For ten days, yet to start   Unknown, Entered By History

## 2017-05-18 LAB
ANION GAP SERPL CALCULATED.3IONS-SCNC: 5 MMOL/L (ref 3–14)
BUN SERPL-MCNC: 8 MG/DL (ref 7–30)
CALCIUM SERPL-MCNC: 8.2 MG/DL (ref 8.5–10.1)
CHLORIDE SERPL-SCNC: 107 MMOL/L (ref 94–109)
CO2 SERPL-SCNC: 28 MMOL/L (ref 20–32)
CREAT SERPL-MCNC: 0.77 MG/DL (ref 0.66–1.25)
ERYTHROCYTE [DISTWIDTH] IN BLOOD BY AUTOMATED COUNT: 13.3 % (ref 10–15)
GFR SERPL CREATININE-BSD FRML MDRD: ABNORMAL ML/MIN/1.7M2
GLUCOSE SERPL-MCNC: 100 MG/DL (ref 70–99)
HCT VFR BLD AUTO: 35.7 % (ref 40–53)
HGB BLD-MCNC: 12.2 G/DL (ref 13.3–17.7)
MCH RBC QN AUTO: 29.5 PG (ref 26.5–33)
MCHC RBC AUTO-ENTMCNC: 34.2 G/DL (ref 31.5–36.5)
MCV RBC AUTO: 86 FL (ref 78–100)
PLATELET # BLD AUTO: 155 10E9/L (ref 150–450)
POTASSIUM SERPL-SCNC: 4.1 MMOL/L (ref 3.4–5.3)
RBC # BLD AUTO: 4.14 10E12/L (ref 4.4–5.9)
SODIUM SERPL-SCNC: 140 MMOL/L (ref 133–144)
WBC # BLD AUTO: 5.7 10E9/L (ref 4–11)

## 2017-05-18 PROCEDURE — 80048 BASIC METABOLIC PNL TOTAL CA: CPT | Performed by: INTERNAL MEDICINE

## 2017-05-18 PROCEDURE — 25000128 H RX IP 250 OP 636: Performed by: PLASTIC SURGERY

## 2017-05-18 PROCEDURE — 85027 COMPLETE CBC AUTOMATED: CPT | Performed by: INTERNAL MEDICINE

## 2017-05-18 PROCEDURE — 36415 COLL VENOUS BLD VENIPUNCTURE: CPT | Performed by: INTERNAL MEDICINE

## 2017-05-18 PROCEDURE — 12000007 ZZH R&B INTERMEDIATE

## 2017-05-18 PROCEDURE — 25000132 ZZH RX MED GY IP 250 OP 250 PS 637: Performed by: PLASTIC SURGERY

## 2017-05-18 PROCEDURE — 99207 ZZC CDG-MDM COMPONENT: MEETS MODERATE - UP CODED: CPT | Performed by: HOSPITALIST

## 2017-05-18 PROCEDURE — 99232 SBSQ HOSP IP/OBS MODERATE 35: CPT | Performed by: HOSPITALIST

## 2017-05-18 PROCEDURE — 25000128 H RX IP 250 OP 636: Performed by: INTERNAL MEDICINE

## 2017-05-18 RX ORDER — NICOTINE 21 MG/24HR
1 PATCH, TRANSDERMAL 24 HOURS TRANSDERMAL DAILY
Status: DISCONTINUED | OUTPATIENT
Start: 2017-05-18 | End: 2017-05-22 | Stop reason: HOSPADM

## 2017-05-18 RX ADMIN — OXYCODONE HYDROCHLORIDE 10 MG: 5 TABLET ORAL at 09:07

## 2017-05-18 RX ADMIN — OXYCODONE HYDROCHLORIDE 10 MG: 5 TABLET ORAL at 14:59

## 2017-05-18 RX ADMIN — VANCOMYCIN HYDROCHLORIDE 1500 MG: 5 INJECTION, POWDER, LYOPHILIZED, FOR SOLUTION INTRAVENOUS at 16:17

## 2017-05-18 RX ADMIN — PIPERACILLIN SODIUM,TAZOBACTAM SODIUM 3.38 G: 3; .375 INJECTION, POWDER, FOR SOLUTION INTRAVENOUS at 09:07

## 2017-05-18 RX ADMIN — ACETAMINOPHEN 975 MG: 325 TABLET, FILM COATED ORAL at 05:21

## 2017-05-18 RX ADMIN — NICOTINE 1 PATCH: 14 PATCH, EXTENDED RELEASE TRANSDERMAL at 09:07

## 2017-05-18 RX ADMIN — ACETAMINOPHEN 975 MG: 325 TABLET, FILM COATED ORAL at 21:25

## 2017-05-18 RX ADMIN — OXYCODONE HYDROCHLORIDE 10 MG: 5 TABLET ORAL at 21:24

## 2017-05-18 RX ADMIN — OXYCODONE HYDROCHLORIDE 10 MG: 5 TABLET ORAL at 12:18

## 2017-05-18 RX ADMIN — GABAPENTIN 300 MG: 300 CAPSULE ORAL at 09:07

## 2017-05-18 RX ADMIN — GABAPENTIN 300 MG: 300 CAPSULE ORAL at 21:24

## 2017-05-18 RX ADMIN — PIPERACILLIN SODIUM,TAZOBACTAM SODIUM 3.38 G: 3; .375 INJECTION, POWDER, FOR SOLUTION INTRAVENOUS at 14:59

## 2017-05-18 RX ADMIN — PIPERACILLIN SODIUM,TAZOBACTAM SODIUM 3.38 G: 3; .375 INJECTION, POWDER, FOR SOLUTION INTRAVENOUS at 03:01

## 2017-05-18 RX ADMIN — PIPERACILLIN SODIUM,TAZOBACTAM SODIUM 3.38 G: 3; .375 INJECTION, POWDER, FOR SOLUTION INTRAVENOUS at 21:25

## 2017-05-18 RX ADMIN — ACETAMINOPHEN 975 MG: 325 TABLET, FILM COATED ORAL at 13:56

## 2017-05-18 RX ADMIN — VANCOMYCIN HYDROCHLORIDE 1500 MG: 5 INJECTION, POWDER, LYOPHILIZED, FOR SOLUTION INTRAVENOUS at 03:57

## 2017-05-18 RX ADMIN — OXYCODONE HYDROCHLORIDE 10 MG: 5 TABLET ORAL at 04:17

## 2017-05-18 RX ADMIN — OXYCODONE HYDROCHLORIDE 10 MG: 5 TABLET ORAL at 18:10

## 2017-05-18 ASSESSMENT — ACTIVITIES OF DAILY LIVING (ADL)
BATHING: 0-->INDEPENDENT
DRESS: 0-->INDEPENDENT
RETIRED_EATING: 0-->INDEPENDENT
AMBULATION: 0-->INDEPENDENT
TOILETING: 0-->INDEPENDENT
TRANSFERRING: 0-->INDEPENDENT
SWALLOWING: 0-->SWALLOWS FOODS/LIQUIDS WITHOUT DIFFICULTY
COGNITION: 0 - NO COGNITION ISSUES REPORTED
FALL_HISTORY_WITHIN_LAST_SIX_MONTHS: NO
RETIRED_COMMUNICATION: 0-->UNDERSTANDS/COMMUNICATES WITHOUT DIFFICULTY

## 2017-05-18 ASSESSMENT — PATIENT HEALTH QUESTIONNAIRE - PHQ9: SUM OF ALL RESPONSES TO PHQ QUESTIONS 1-9: 22

## 2017-05-18 ASSESSMENT — ANXIETY QUESTIONNAIRES: GAD7 TOTAL SCORE: 11

## 2017-05-18 NOTE — PLAN OF CARE
Problem: Goal Outcome Summary  Goal: Goal Outcome Summary  Outcome: No Change  VSS, up independently. Oxycodone and tylenol for pain. Dressing changed twice today, leaking serosanguinous fluid. Eating well, voiding.

## 2017-05-18 NOTE — PLAN OF CARE
Problem: Goal Outcome Summary  Goal: Goal Outcome Summary  Outcome: Improving  A/O, AVSS. Pain controlled with Oxycodone. LS clear. BS active, passing flatus. Back dressing shadowing marked at start of shift, no change overnight. Tolerating regular diet. Ambulated to bathroom independently. Adequate UOP.

## 2017-05-18 NOTE — PROGRESS NOTES
Plastic Surgery POD# 1    Patient feeling better    Dressing changed, no bleeding.  Swollen as expected, but erythema beginning to lighten    Plan: cont vanco/zosyn until cultures back.  ID consult today.  Soc services to begin d/c planning for BID wet to dry dressing changes.

## 2017-05-18 NOTE — OP NOTE
DATE OF PROCEDURE:  05/17/2017      SURGEON:  Wayne Palma MD      PRE-PROCEDURE DIAGNOSIS:  Lumbar back wound infection and abscess.      POSTOPERATIVE DIAGNOSIS:  Lumbar back wound infection and abscess.      PROCEDURE:  Incision and drainage of deep abscess of lower back subcutaneous space.      DESCRIPTION OF PROCEDURE:  Ko Bell was marked preoperatively.  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  I explored this recently and performed a multilayered closure including a paraspinous muscle hernia fascial repair.  This has gone on to have the same trouble with seromas and after recent percutaneous drain placement, now a deep infection.  The risks and benefits of exploratory surgery and drainage were explained to the patient who was agreeable.  He was placed under general endotracheal anesthesia and turned in the prone position with careful padding and positioning.  The lower back was prepped and draped in a sterile fashion.  A timeout was done.  I opened a longitudinal scar and about 30 mL of milky purulent fluid was removed.  This was sent for culture.  I checked carefully and found that there was no excessive undermining beyond where the previous surgery had created a surgical space.  There was no clinical evidence of necrotizing type of infection.  I irrigated with 1 liter of double antibiotic solution and checked for final hemostasis.  I injected 20 mL of 0.25% Marcaine 1:200,000 epinephrine into the soft tissues for postoperative pain relief.  I packed the wound open with antibiotic-soaked gauze and placed absorbent pad over this.  He was turned back in the supine position.  Anesthesia was reversed and he was taken to the recovery room in satisfactory condition.  Estimated blood loss 20 mL.  Sponge and needle counts correct.  One specimen lower back wound culture.         WAYNE PALMA MD             D: 05/17/2017 18:21    T: 2017 01:06   MT: MOHAN#[id number]      Name:     TATIANA STARK   MRN:      [id number]        Account:        [id number]   :      1980           Procedure Date: 2017      Document: [id number]
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
## 2017-05-18 NOTE — PLAN OF CARE
Problem: Goal Outcome Summary  Goal: Goal Outcome Summary  A/O x4. VSS, except elevated temp. Incision on the back with scant SS drainage, will continue to monitor. Left side of the back is red and warm, marked. CMS intact on BLE. Pt removes BP cuff. IS 3000. Pain managed with oxycodone, tylenol and gabapentin. Tolerates regular diet well. Voiding in the BR adequately.

## 2017-05-18 NOTE — PHARMACY-VANCOMYCIN DOSING SERVICE
Pharmacy Vancomycin Initial Note  Date of Service May 17, 2017  Patient's  1980  37 year old, male    Indication: Abscess    Current estimated CrCl = Estimated Creatinine Clearance: 140.8 mL/min (based on Cr of 0.76).    Creatinine for last 3 days  2017:  2:40 PM Creatinine 0.76 mg/dL    Recent Vancomycin Level(s) for last 3 days  No results found for requested labs within last 72 hours.      Vancomycin IV Administrations (past 72 hours)                   vancomycin 1250 mg in 0.9% NaCl 250 mL PREMIX (mg) 1,250 mg New Bag 17 1654                Nephrotoxins and other renal medications (Future)    Start     Dose/Rate Route Frequency Ordered Stop    17 0300  vancomycin (VANCOCIN) 1500 mg in 0.9% NaCl 250 mL PREMIX      1,500 mg Intravenous EVERY 12 HOURS 17 21417  piperacillin-tazobactam (ZOSYN) 3.375 g vial to attach to  mL bag     Comments:  Pharmacy can adjust dose based on renal function.    3.375 g  over 1 Hours Intravenous EVERY 6 HOURS 17            Contrast Orders - past 72 hours     None                Plan:  1.  Start vancomycin  1500 mg IV q12h.   2.  Goal Trough Level: 15-20 mg/L   3.  Pharmacy will check trough levels as appropriate in 1-3 Days.    4. Serum creatinine levels will be ordered daily for the first week of therapy and at least twice weekly for subsequent weeks.    5. Laura method utilized to dose vancomycin therapy: Method 2    Mili Corrales

## 2017-05-18 NOTE — ANESTHESIA POSTPROCEDURE EVALUATION
Patient: Ko Bell    Procedure(s):  IRRIGATION AND DRAINAGE OF BACK WOUND - Wound Class: II-Clean Contaminated    Diagnosis:Unknown  Diagnosis Additional Information: No value filed.    Anesthesia Type:  General, ETT    Note:  Anesthesia Post Evaluation    Patient location during evaluation: PACU  Patient participation: Able to fully participate in evaluation  Level of consciousness: awake and alert  Pain management: satisfactory to patient  Airway patency: patent  Cardiovascular status: hemodynamically stable  Respiratory status: acceptable and unassisted  Hydration status: balanced  PONV: none     Anesthetic complications: None          Last vitals:  Vitals:    05/17/17 1930 05/17/17 1940 05/17/17 2000   BP:  114/68 104/52   Pulse:   84   Resp: 15 12 16   Temp: 36.5  C (97.7  F) 36.4  C (97.5  F) 37.2  C (99  F)   SpO2: 97% 96% 96%         Electronically Signed By: Lexx Whitney MD  May 17, 2017  9:50 PM

## 2017-05-18 NOTE — PROGRESS NOTES
[facility]    Hospitalist Progress Note      Assessment & Plan   Ko Bell is a 37 year old male who was admitted on 5/17/2017.  Past history of bipolar disorder and recent diskectomy who presents with infected seroma.    Sepsis due to postoperative wound infection s/p I&D 5/17.  Presents reporting fever at home, tachycardic upon arrival with evidence of infected seroma and elevated inflammatory markers.  No other SIRS criteria present.  Underwent I&D by Plastic Surgery 5/17.  - post-op management including pain control and DVT prophylaxis per Plastic Surgery  - prior cultures with Staph and Acinetobacter per Plastic Surgery  - surgical fluid gram stain with GPC in clusters, culture pending  - continue vanco and zosyn pending ID consult  - stop IVF    Acute blood loss anemia:  - pre-op hgb 14 > 12.2 post-op    DVT Prophylaxis: Pneumatic Compression Devices  Code Status: Full Code    Disposition: Expected discharge in 1-2 days pending culture results and antibiotic plan.    Edgar Garcia    Interval History   Reports borderline pain control.  Denies fever/chills, dyspnea, chest pressure.  Has not moved bowels in days, which he reports is normal for him.      -Data reviewed today: I reviewed all new labs and imaging results over the last 24 hours. I personally reviewed no images or EKG's today.    Physical Exam   Temp: 98.7  F (37.1  C) Temp src: Oral BP: 93/56 Pulse: 65 Heart Rate: 64 Resp: 16 SpO2: 96 % O2 Device: None (Room air) Oxygen Delivery: 3 LPM  Vitals:    05/17/17 1342   Weight: 74.8 kg (165 lb)     Vital Signs with Ranges  Temp:  [97  F (36.1  C)-100.2  F (37.9  C)] 98.7  F (37.1  C)  Pulse:  [] 65  Heart Rate:  [64-93] 64  Resp:  [11-23] 16  BP: ()/(52-86) 93/56  SpO2:  [96 %-100 %] 96 %  I/O last 3 completed shifts:  In: 2114 [P.O.:520; I.V.:1594]  Out: 20 [Blood:20]    Constitutional: Well developed, well nourished male in no acute distress  Respiratory: Lungs clear  to ausculation bilaterally without crackles or wheezes, no tachypnea  Cardiovascular: regular rate and rhythm, normal S1/S2 without murmur, rubs or gallops, no peripheral edema  GI: abdomen soft, non-tender, non-distended, normal bowel sounds  Skin/Integumen:  Surgical dressing with what appears to be serosanguinous drainage  Other:  alert and appropriate, cranial nerves grossly intact    Medications     NaCl 100 mL/hr at 05/17/17 2224       nicotine   Transdermal Q8H     [START ON 5/19/2017] nicotine   Transdermal Daily     nicotine  1 patch Transdermal Daily     piperacillin-tazobactam  3.375 g Intravenous Q6H     gabapentin  300 mg Oral BID     sodium chloride (PF)  3 mL Intracatheter Q8H     acetaminophen  975 mg Oral Q8H     vancomycin (VANCOCIN) IV  1,500 mg Intravenous Q12H       Data     Recent Labs  Lab 05/18/17  0750 05/17/17  1440 05/17/17  1237   WBC 5.7 9.8 9.4   HGB 12.2* 14.0 14.0   MCV 86 86 86    143* 152   INR  --  1.03  --     136  --    POTASSIUM 4.1 4.2  --    CHLORIDE 107 101  --    CO2 28 29  --    BUN 8 11  --    CR 0.77 0.76  --    ANIONGAP 5 6  --    OLAMIDE 8.2* 9.1  --    * 96  --    ALBUMIN  --  3.1*  --    PROTTOTAL  --  7.1  --    BILITOTAL  --  0.4  --    ALKPHOS  --  80  --    ALT  --  31  --    AST  --  21  --        No results found for this or any previous visit (from the past 24 hour(s)).

## 2017-05-19 LAB
BACTERIA SPEC CULT: ABNORMAL
CREAT SERPL-MCNC: 0.87 MG/DL (ref 0.66–1.25)
GFR SERPL CREATININE-BSD FRML MDRD: NORMAL ML/MIN/1.7M2
GLUCOSE SERPL-MCNC: 110 MG/DL (ref 70–99)
HGB BLD-MCNC: 12.6 G/DL (ref 13.3–17.7)
MICRO REPORT STATUS: ABNORMAL
MICROORGANISM SPEC CULT: ABNORMAL
SPECIMEN SOURCE: ABNORMAL
VANCOMYCIN SERPL-MCNC: 9.4 MG/L

## 2017-05-19 PROCEDURE — 82565 ASSAY OF CREATININE: CPT | Performed by: PLASTIC SURGERY

## 2017-05-19 PROCEDURE — 85018 HEMOGLOBIN: CPT | Performed by: PLASTIC SURGERY

## 2017-05-19 PROCEDURE — 99232 SBSQ HOSP IP/OBS MODERATE 35: CPT | Performed by: HOSPITALIST

## 2017-05-19 PROCEDURE — 82947 ASSAY GLUCOSE BLOOD QUANT: CPT | Performed by: PLASTIC SURGERY

## 2017-05-19 PROCEDURE — 99207 ZZC CDG-MDM COMPONENT: MEETS MODERATE - UP CODED: CPT | Performed by: HOSPITALIST

## 2017-05-19 PROCEDURE — 80202 ASSAY OF VANCOMYCIN: CPT | Performed by: PLASTIC SURGERY

## 2017-05-19 PROCEDURE — 12000007 ZZH R&B INTERMEDIATE

## 2017-05-19 PROCEDURE — 25000128 H RX IP 250 OP 636: Performed by: PLASTIC SURGERY

## 2017-05-19 PROCEDURE — 25000132 ZZH RX MED GY IP 250 OP 250 PS 637: Performed by: PLASTIC SURGERY

## 2017-05-19 PROCEDURE — 25000132 ZZH RX MED GY IP 250 OP 250 PS 637: Performed by: HOSPITALIST

## 2017-05-19 PROCEDURE — 25000132 ZZH RX MED GY IP 250 OP 250 PS 637: Performed by: INTERNAL MEDICINE

## 2017-05-19 PROCEDURE — 25000128 H RX IP 250 OP 636: Performed by: INTERNAL MEDICINE

## 2017-05-19 PROCEDURE — 36415 COLL VENOUS BLD VENIPUNCTURE: CPT | Performed by: PLASTIC SURGERY

## 2017-05-19 PROCEDURE — 97605 NEG PRS WND THER DME<=50SQCM: CPT

## 2017-05-19 PROCEDURE — 99212 OFFICE O/P EST SF 10 MIN: CPT

## 2017-05-19 RX ORDER — POLYETHYLENE GLYCOL 3350 17 G/17G
17 POWDER, FOR SOLUTION ORAL DAILY
Status: DISCONTINUED | OUTPATIENT
Start: 2017-05-19 | End: 2017-05-20

## 2017-05-19 RX ORDER — SENNOSIDES 8.6 MG
2 TABLET ORAL DAILY
Status: DISCONTINUED | OUTPATIENT
Start: 2017-05-19 | End: 2017-05-20

## 2017-05-19 RX ORDER — VANCOMYCIN HYDROCHLORIDE 1 G/200ML
1000 INJECTION, SOLUTION INTRAVENOUS EVERY 8 HOURS
Status: DISCONTINUED | OUTPATIENT
Start: 2017-05-19 | End: 2017-05-20

## 2017-05-19 RX ADMIN — POLYETHYLENE GLYCOL 3350 17 G: 17 POWDER, FOR SOLUTION ORAL at 16:53

## 2017-05-19 RX ADMIN — ACETAMINOPHEN 975 MG: 325 TABLET, FILM COATED ORAL at 22:09

## 2017-05-19 RX ADMIN — OXYCODONE HYDROCHLORIDE 10 MG: 5 TABLET ORAL at 09:38

## 2017-05-19 RX ADMIN — OXYCODONE HYDROCHLORIDE 10 MG: 5 TABLET ORAL at 16:53

## 2017-05-19 RX ADMIN — SENNOSIDES A AND B 2 TABLET: 8.6 TABLET, FILM COATED ORAL at 16:55

## 2017-05-19 RX ADMIN — NICOTINE 1 PATCH: 14 PATCH, EXTENDED RELEASE TRANSDERMAL at 09:32

## 2017-05-19 RX ADMIN — VANCOMYCIN HYDROCHLORIDE 1000 MG: 1 INJECTION, SOLUTION INTRAVENOUS at 17:46

## 2017-05-19 RX ADMIN — GABAPENTIN 300 MG: 300 CAPSULE ORAL at 09:32

## 2017-05-19 RX ADMIN — SENNOSIDES AND DOCUSATE SODIUM 2 TABLET: 8.6; 5 TABLET ORAL at 16:53

## 2017-05-19 RX ADMIN — GABAPENTIN 300 MG: 300 CAPSULE ORAL at 22:10

## 2017-05-19 RX ADMIN — PIPERACILLIN SODIUM,TAZOBACTAM SODIUM 3.38 G: 3; .375 INJECTION, POWDER, FOR SOLUTION INTRAVENOUS at 03:49

## 2017-05-19 RX ADMIN — PIPERACILLIN SODIUM,TAZOBACTAM SODIUM 3.38 G: 3; .375 INJECTION, POWDER, FOR SOLUTION INTRAVENOUS at 09:32

## 2017-05-19 RX ADMIN — VANCOMYCIN HYDROCHLORIDE 1500 MG: 5 INJECTION, POWDER, LYOPHILIZED, FOR SOLUTION INTRAVENOUS at 04:27

## 2017-05-19 RX ADMIN — SENNOSIDES AND DOCUSATE SODIUM 2 TABLET: 8.6; 5 TABLET ORAL at 16:54

## 2017-05-19 RX ADMIN — OXYCODONE HYDROCHLORIDE 10 MG: 5 TABLET ORAL at 13:48

## 2017-05-19 RX ADMIN — ACETAMINOPHEN 975 MG: 325 TABLET, FILM COATED ORAL at 05:58

## 2017-05-19 RX ADMIN — ACETAMINOPHEN 975 MG: 325 TABLET, FILM COATED ORAL at 13:48

## 2017-05-19 RX ADMIN — OXYCODONE HYDROCHLORIDE 10 MG: 5 TABLET ORAL at 06:07

## 2017-05-19 RX ADMIN — OXYCODONE HYDROCHLORIDE 10 MG: 5 TABLET ORAL at 19:54

## 2017-05-19 RX ADMIN — OXYCODONE HYDROCHLORIDE 10 MG: 5 TABLET ORAL at 02:06

## 2017-05-19 NOTE — PLAN OF CARE
Problem: Goal Outcome Summary  Goal: Goal Outcome Summary  Outcome: No Change  A&Ox4.  AVSS.  Oxycodone and scheduled Tylenol for pain.  Dressing CDI.  Nicotine patch in place.  IVF infusing, on IV antibiotics.  IS up to 2500.  Up independently.  Bowel sounds active, (+)flatus, pt verbalized not having BM everyday as baseline.  Slept in between care.

## 2017-05-19 NOTE — PHARMACY-VANCOMYCIN DOSING SERVICE
Pharmacy Vancomycin Note  Date of Service May 19, 2017  Patient's  1980   37 year old, male    Indication: Abscess  Goal Trough Level: 15-20 mg/L  Day of Therapy: 3  Current Vancomycin regimen:  1500 mg IV q12h    Current estimated CrCl = Estimated Creatinine Clearance: 123 mL/min (based on Cr of 0.87).    Creatinine for last 3 days  2017:  2:40 PM Creatinine 0.76 mg/dL  2017:  7:50 AM Creatinine 0.77 mg/dL  2017:  7:10 AM Creatinine 0.87 mg/dL    Recent Vancomycin Levels (past 3 days)  2017:  3:10 PM Vancomycin Level 9.4 mg/L    Vancomycin IV Administrations (past 72 hours)                   vancomycin (VANCOCIN) 1500 mg in 0.9% NaCl 250 mL PREMIX (mg) 1,500 mg New Bag 17 0427     1,500 mg New Bag 17 1617     1,500 mg New Bag  0357    vancomycin 1250 mg in 0.9% NaCl 250 mL PREMIX (mg) 1,250 mg New Bag 17 1654                Nephrotoxins and other renal medications (Future)    Start     Dose/Rate Route Frequency Ordered Stop    17 1600  vancomycin (VANCOCIN) 1000 mg in dextrose 5% 200 mL PREMIX      1,000 mg Intravenous EVERY 8 HOURS 17 1552               Contrast Orders - past 72 hours     None          Interpretation of levels and current regimen:  Trough level is  Subtherapeutic    Has serum creatinine changed > 50% in last 72 hours: No    Urine output:  good urine output    Renal Function: Stable    Plan:  1.  Change dose to 1 g q 8 h  2.  Pharmacy will check trough levels as appropriate in 1-3 Days.    3. Serum creatinine levels will be ordered daily for the first week of therapy and at least twice weekly for subsequent weeks.      Macarena Dobbins        .

## 2017-05-19 NOTE — PROGRESS NOTES
[facility]    Infectious Disease Progress Note    Date of Service (when I saw the patient): 05/19/2017     Assessment & Plan   Ko Bell is a 37 year old male who was admitted on 5/17/2017.     Impression:   37 y.o male with initial surgery ( lumbar diskectomy) done in January 2017.   Post surgical course complicated with seroma formation and multiple I and D done.   Most recently had an I and D on 4/25/17 with drain placement. Was on keflex.   Admitted this occasion with redness, drainage and concern for infection. S/P I and D of the back wound.   On IV vancomycin and zosyn. Gram stain from the OR positive for staph aureus pending Pratik.       Recommendations:   Continue on Vancomycin alone.   Will follow up on the pending cultures.   Message left to Dr. Palma`s clinic to discuss depth of the infection to determine IV vs oral antibiotics and duration.     Megan Og MD    Interval History   Afebrile   No new complaints     Physical Exam   Temp: 98.7  F (37.1  C) Temp src: Oral BP: 99/60 Pulse: 69 Heart Rate: 69 Resp: 16 SpO2: 97 % O2 Device: None (Room air)    Vitals:    05/17/17 1342   Weight: 74.8 kg (165 lb)     Vital Signs with Ranges  Temp:  [98.3  F (36.8  C)-98.9  F (37.2  C)] 98.7  F (37.1  C)  Pulse:  [66-86] 69  Heart Rate:  [44-69] 69  Resp:  [16-18] 16  BP: ()/(52-67) 99/60  SpO2:  [97 %-98 %] 97 %    Constitutional: Awake, alert, cooperative, no apparent distress  Lungs: Clear to auscultation bilaterally, no crackles or wheezing  Cardiovascular: Regular rate and rhythm, normal S1 and S2, and no murmur noted  Abdomen: Normal bowel sounds, soft, non-distended, non-tender  Skin: No rashes, no cyanosis, no edema  Other:    Medications        nicotine   Transdermal Q8H     nicotine   Transdermal Daily     nicotine  1 patch Transdermal Daily     piperacillin-tazobactam  3.375 g Intravenous Q6H     gabapentin  300 mg Oral BID     sodium chloride (PF)  3 mL Intracatheter Q8H      acetaminophen  975 mg Oral Q8H     vancomycin (VANCOCIN) IV  1,500 mg Intravenous Q12H       Data   All microbiology laboratory data reviewed.  Recent Labs   Lab Test  05/19/17   0710  05/18/17   0750  05/17/17   1440  05/17/17   1237   WBC   --   5.7  9.8  9.4   HGB  12.6*  12.2*  14.0  14.0   HCT   --   35.7*  40.1  40.8   MCV   --   86  86  86   PLT   --   155  143*  152     Recent Labs   Lab Test  05/19/17   0710  05/18/17   0750  05/17/17   1440   CR  0.87  0.77  0.76     Recent Labs   Lab Test  05/17/17   1237   SED  29*     Recent Labs   Lab Test  05/17/17   1752  05/17/17   1535  05/17/17   1440  05/15/17   1213  05/15/17   1133  01/31/17   1657  01/31/17   1656  01/20/17   1040   CULT  Moderate growth Staphylococcus aureus*  Culture negative monitoring continues  No growth after 2 days  No growth after 2 days  No growth after 4 days  No growth after 4 days  No anaerobes isolated  No growth  No anaerobes isolated  No growth  No growth

## 2017-05-19 NOTE — PROGRESS NOTES
Received notification back from [facility] that patient has 100% coverage for services if needed. The agency that would serve patient is [facility] out of [place]. The phone number is [phone]. 1500 I had received a call from Luz at [facility]iring about if [facility] could staff the dressing changes to back. I called [facility] and they will check with the agency.     [address] [facility] called back and recommends [facility] to still follow for help with the dressing changes.

## 2017-05-19 NOTE — PLAN OF CARE
Problem: Individualization  Goal: Patient Preferences  Outcome: No Change  Vss, a-febrile, c/o lower back  Wound pain, oxycodone helping, ambulating independently in the hallway, dressing lower back changed, CDI, passing gas, No bowel movement today.

## 2017-05-19 NOTE — PROGRESS NOTES
WOC update:     Spoke with Dr. Palma's nurse, verbal ok received to try wound vac to lumbar wound, no contraindications, only concern is pt's pain issues.  Will try vac, and if not tolerated can discontinue and go back to wet-to-dry dressings, per Dr. Palma.       [employer] wound vac dressing applied using 1pc black foam to wound bed.  TRAC pad padded, area sealed with drape, good seal noted at -125mmHg.  Pt had minimal to moderate pain, c/o of stinging to general area but is willing to try to see if can tolerate.  He understands rationale for vac and is motivated to make it work.      Plan for lumbar spine wound:  MWF and prn vac dressing changes by WOC or Surgery team, using black foam and NPWT at -125mmHg continuous.   If pt not tolerating vac, notify surgery and discontinue, and return to gauze dressings.      WOC will return: Monday if pt remains in hospital  Face to face: 30 min

## 2017-05-19 NOTE — PROGRESS NOTES
SW: Consult acknowledged. Please see RN care coordinator documentation regarding home services at discharge. Pt would have coverage for home infusion and home care would be arranged through [facility]. Pt sig other to come in tonight for teaching on dressing changes. Consult completed.

## 2017-05-19 NOTE — PROGRESS NOTES
Care Transition Initial Assessment - RN        Met with: Patient.    DATA   Active Problems:    Sepsis (H)    Wound infection       Cognitive Status: awake, alert and oriented.        Contact information and PCP information verified: Yes    Lives With: alone with a significant other, kids every other weekend.  Insurance concerns: No Insurance issues identified also states this is workman's comp, notified our financial counselors    ASSESSMENT  Patient currently receives the following services:  none       Identified issues/concerns regarding health management: will be unable to reach his wound with consistency  Instructed patient in benefits for home infusion. Instructed patient that someone would have to help with his dressing changes. We discussed with bedside RN to do teaching with significant other while patient is hospitalized to learn the dressing change.     PLAN  Financial costs for the patient include Per Home Infusion has 100% coverage if IV antibiotics needed at home.   Patient given options and choices for discharge yes  Patient/family is agreeable to the plan?  Yes:   Patient anticipates discharging to Home with Home care services.         Patient anticipates needs for home equipment: undetermined awaiting final recommendations on antibiotics and wound care.    Plan/Disposition: Home   Appointments:         Care  (CTS) will continue to follow as needed.

## 2017-05-19 NOTE — PROGRESS NOTES
Plastic Surgery POD# 2    Patient feeling sore with dressing changes, otherwise doing fine    Wound stable, surrounding skin much softer, less swelling and redness    Plan:  Await sensitivities on Staph culture.  Home when abx regimen determined.  NOTE: this is a subcutaneous abscess, no involvement of deeper structures.  Short course of abx ok, oral vs IV will be based on sensitivities.

## 2017-05-19 NOTE — PROGRESS NOTES
[facility] Nurse Inpatient Wound Assessment     Initial Assessment of wound(s) on pt's:   Lumbar spine         Data:   Patient History:      per MD note(s): Ko Bell is a 37 year old male who was admitted on 2017. Past history of bipolar disorder and recent diskectomy who presents with infected seroma.       Current Diet / Nutrition:       Active Diet Order      Combination Diet Regular Diet Adult             Dewey Assessment and sub scores:   Dewey Score  Av.8  Min: 21  Max: 23       Labs:       Recent Labs   Lab Test  17   0710  17   0750  17   1440  17   1237   ALBUMIN   --    --   3.1*   --    HGB  12.6*  12.2*  14.0  14.0   RBC   --   4.14*  4.67  4.76   WBC   --   5.7  9.8  9.4   PLT   --   155  143*  152   INR   --    --   1.03   --    CRP   --    --    --   140.0*          Wound Assessment (location):   Lumbar spine   Wound History:  Pt states has had ongoing issues here for past couple of months; had recent diskectomy.  Now s/p I&D of infected seroma 17 by Dr. Palma.     Wound Base: nearly 100% red moist tissue, tiny area of white fibrous tissue    Specific Dimensions (length x width x depth, in cm) :   6.5 x 2.5 x 2.5 cm, very clean defined edges; very regular cookie-cutter shape    Tunneling:  N/A    Undermining: N/A    Palpation of the wound bed:  normal    Slough appearance:  none    Eschar appearance:  none    Periwound Skin: intact, slightly indurated left > right side    Color: normal and consistent with surrounding tissue, with slight erythema that looks related to tape irritation    Temperature  normal     Drainage:  Moderate serosang     Odor: none    Pain:  moderate           Intervention:     Patient's chart evaluated.      Wound(s) assessed    Wound Care: repacked with saline-moist gauze    Orders  Reviewed and Written    Supplies  Reviewed    Discussed plan of care with Patient and Nurse, phone call out to Dr. Palma  who is currently in surgery          Assessment:             Lumbar wound post I&D looks very clean; tissue healthy-appearing.   First thought is that this looks like a great place to apply a wound vac, to help speed up healing.  Wound is a deep gaping deficit that will otherwise likely take a very long time to fill in and heal completely.  However Dr. Palma had vac available at surgery and did not apply, deciding to do packing instead, so there may be contraindications for vac.  If packing is continued, could consider using Dakin's solution for awhile instead of saline to provide continued antimicrobial treatment to area.          Plan:     Nursing to notify the Provider(s) and re-consult the [facility] Nurse if wound(s) deteriorate(s) or if the wound care plan needs reevaluation.      Plan of care for wound located on lumbar spine: for now continue TID packin.  Cleanse with MicroKlenz, pat dry.  2.  Apply skin prep to periwound and anywhere tape will touch, let dry.  3.  Lightly moisten gauze fluff with saline, pack into wound.  4.  Cover with ABD pad and Medipore tape.  5.  Label with time/date/initials.  6.  Minimize pressure to area.     [facility] Nurse will return: as needed     Face to face time: 16-30 Minutes    ADDENDUM:  Spoke with Dr. Palma's nurse, ok to trial wound vac.  [facility] will apply this afternoon.

## 2017-05-19 NOTE — PROGRESS NOTES
[facility]    Hospitalist Progress Note      Assessment & Plan   Ko Bell is a 37 year old male who was admitted on 5/17/2017.  Past history of bipolar disorder and recent diskectomy who presents with infected seroma.    Sepsis due to postoperative wound infection s/p I&D 5/17.  Presents reporting fever at home, tachycardic upon arrival with evidence of infected seroma and elevated inflammatory markers.  No other SIRS criteria present.  Underwent I&D by Plastic Surgery 5/17.  - post-op management including pain control and DVT prophylaxis per Plastic Surgery  - prior cultures with Staph and Acinetobacter per Plastic Surgery  - surgical culture with Staph aureus, sensitivities pending  - continue vanco, stop zosyn per ID    Acute blood loss anemia:  Pre-op hgb 14 stabilized about 12 g/dL post-op.      Constipation:  States he normally moves bowels once weekly, but now feeling constipated on narcotics.  - start scheduled senna and miralax daily    DVT Prophylaxis: Pneumatic Compression Devices  Code Status: Full Code    Disposition: Expected discharge in 1 day pending culture results and antibiotic plan.    Edgar Garcia    Interval History   Less pain today, no bowel movement for several days.  No fever/chills or other complaints.     -Data reviewed today: I reviewed all new labs and imaging results over the last 24 hours. I personally reviewed no images or EKG's today.    Physical Exam   Temp: 97.8  F (36.6  C) Temp src: Oral BP: 113/47 Pulse: 62 Heart Rate: 61 Resp: 16 SpO2: 94 % O2 Device: None (Room air)    Vitals:    05/17/17 1342   Weight: 74.8 kg (165 lb)     Vital Signs with Ranges  Temp:  [97.8  F (36.6  C)-98.9  F (37.2  C)] 97.8  F (36.6  C)  Pulse:  [62-86] 62  Heart Rate:  [44-69] 61  Resp:  [16-18] 16  BP: ()/(47-60) 113/47  SpO2:  [94 %-98 %] 94 %  I/O last 3 completed shifts:  In: 540 [P.O.:540]  Out: -     Constitutional: Well developed, well nourished male in no acute  distress  Respiratory: Lungs clear to ausculation bilaterally without crackles or wheezes, no tachypnea  Cardiovascular: regular rate and rhythm, normal S1/S2 without murmur, rubs or gallops  GI: abdomen soft, normal bowel sounds  Skin/Integumen:    Other:  alert and appropriate, cranial nerves grossly intact    Medications        nicotine   Transdermal Q8H     nicotine   Transdermal Daily     nicotine  1 patch Transdermal Daily     gabapentin  300 mg Oral BID     sodium chloride (PF)  3 mL Intracatheter Q8H     acetaminophen  975 mg Oral Q8H     vancomycin (VANCOCIN) IV  1,500 mg Intravenous Q12H       Data     Recent Labs  Lab 05/19/17  0710 05/18/17  0750 05/17/17  1440 05/17/17  1237   WBC  --  5.7 9.8 9.4   HGB 12.6* 12.2* 14.0 14.0   MCV  --  86 86 86   PLT  --  155 143* 152   INR  --   --  1.03  --    NA  --  140 136  --    POTASSIUM  --  4.1 4.2  --    CHLORIDE  --  107 101  --    CO2  --  28 29  --    BUN  --  8 11  --    CR 0.87 0.77 0.76  --    ANIONGAP  --  5 6  --    OLAMIDE  --  8.2* 9.1  --    * 100* 96  --    ALBUMIN  --   --  3.1*  --    PROTTOTAL  --   --  7.1  --    BILITOTAL  --   --  0.4  --    ALKPHOS  --   --  80  --    ALT  --   --  31  --    AST  --   --  21  --        No results found for this or any previous visit (from the past 24 hour(s)).

## 2017-05-19 NOTE — PLAN OF CARE
Problem: Goal Outcome Summary  Goal: Goal Outcome Summary  Outcome: No Change  Post back dressing c moderate ss output. Site repacked/changed ~2130. Afebrile. Pain well managed with prn oxy. Passing gas. Ambulating.

## 2017-05-20 LAB
CREAT SERPL-MCNC: 0.79 MG/DL (ref 0.66–1.25)
GFR SERPL CREATININE-BSD FRML MDRD: NORMAL ML/MIN/1.7M2

## 2017-05-20 PROCEDURE — 82565 ASSAY OF CREATININE: CPT | Performed by: PLASTIC SURGERY

## 2017-05-20 PROCEDURE — 12000007 ZZH R&B INTERMEDIATE

## 2017-05-20 PROCEDURE — 25000128 H RX IP 250 OP 636: Performed by: PLASTIC SURGERY

## 2017-05-20 PROCEDURE — 99207 ZZC CDG-MDM COMPONENT: MEETS MODERATE - UP CODED: CPT | Performed by: HOSPITALIST

## 2017-05-20 PROCEDURE — 25000132 ZZH RX MED GY IP 250 OP 250 PS 637: Performed by: PLASTIC SURGERY

## 2017-05-20 PROCEDURE — 99232 SBSQ HOSP IP/OBS MODERATE 35: CPT | Performed by: HOSPITALIST

## 2017-05-20 PROCEDURE — 25000128 H RX IP 250 OP 636: Performed by: INTERNAL MEDICINE

## 2017-05-20 PROCEDURE — 36415 COLL VENOUS BLD VENIPUNCTURE: CPT | Performed by: PLASTIC SURGERY

## 2017-05-20 RX ORDER — SENNOSIDES 8.6 MG
2 TABLET ORAL 2 TIMES DAILY
Status: DISCONTINUED | OUTPATIENT
Start: 2017-05-20 | End: 2017-05-22 | Stop reason: HOSPADM

## 2017-05-20 RX ORDER — CEFAZOLIN SODIUM 2 G/100ML
2 INJECTION, SOLUTION INTRAVENOUS EVERY 8 HOURS
Status: DISCONTINUED | OUTPATIENT
Start: 2017-05-20 | End: 2017-05-22 | Stop reason: HOSPADM

## 2017-05-20 RX ORDER — POLYETHYLENE GLYCOL 3350 17 G/17G
17 POWDER, FOR SOLUTION ORAL 2 TIMES DAILY
Status: DISCONTINUED | OUTPATIENT
Start: 2017-05-20 | End: 2017-05-21

## 2017-05-20 RX ADMIN — OXYCODONE HYDROCHLORIDE 10 MG: 5 TABLET ORAL at 12:13

## 2017-05-20 RX ADMIN — VANCOMYCIN HYDROCHLORIDE 1000 MG: 1 INJECTION, SOLUTION INTRAVENOUS at 09:06

## 2017-05-20 RX ADMIN — OXYCODONE HYDROCHLORIDE 10 MG: 5 TABLET ORAL at 22:05

## 2017-05-20 RX ADMIN — ACETAMINOPHEN 975 MG: 325 TABLET, FILM COATED ORAL at 06:11

## 2017-05-20 RX ADMIN — OXYCODONE HYDROCHLORIDE 10 MG: 5 TABLET ORAL at 09:12

## 2017-05-20 RX ADMIN — ACETAMINOPHEN 975 MG: 325 TABLET, FILM COATED ORAL at 14:52

## 2017-05-20 RX ADMIN — GABAPENTIN 300 MG: 300 CAPSULE ORAL at 09:05

## 2017-05-20 RX ADMIN — CEFAZOLIN SODIUM 2 G: 2 INJECTION, SOLUTION INTRAVENOUS at 11:29

## 2017-05-20 RX ADMIN — VANCOMYCIN HYDROCHLORIDE 1000 MG: 1 INJECTION, SOLUTION INTRAVENOUS at 00:29

## 2017-05-20 RX ADMIN — OXYCODONE HYDROCHLORIDE 10 MG: 5 TABLET ORAL at 19:07

## 2017-05-20 RX ADMIN — OXYCODONE HYDROCHLORIDE 10 MG: 5 TABLET ORAL at 00:26

## 2017-05-20 RX ADMIN — GABAPENTIN 300 MG: 300 CAPSULE ORAL at 22:05

## 2017-05-20 RX ADMIN — OXYCODONE HYDROCHLORIDE 10 MG: 5 TABLET ORAL at 06:11

## 2017-05-20 RX ADMIN — CEFAZOLIN SODIUM 2 G: 2 INJECTION, SOLUTION INTRAVENOUS at 18:46

## 2017-05-20 RX ADMIN — NICOTINE 1 PATCH: 14 PATCH, EXTENDED RELEASE TRANSDERMAL at 09:05

## 2017-05-20 NOTE — PROGRESS NOTES
[facility]    Infectious Disease Progress Note    Date of Service (when I saw the patient): 05/20/2017     Assessment & Plan   Ko Bell is a 37 year old male who was admitted on 5/17/2017.     Impression:   37 y.o male with initial surgery ( lumbar diskectomy) done in January 2017.   Post surgical course complicated with seroma formation and multiple I and D done.   Most recently had an I and D on 4/25/17 with drain placement. Was on keflex.   Admitted this occasion with redness, drainage and concern for infection. S/P I and D of the back wound.   On IV vancomycin and zosyn. Gram stain from the OR positive for staph aureus MSSA.      Recommendations:   Switching to Ancef based on susceptibilities, continue while inpatient.   Discussed case with Dr. Palma, he confirms this is a superficial infection so when ready for discharge can be switched to Bactrim 2 DS tab BID for 10 days.     Megan Og MD    Interval History   Afebrile   No new complaints     Physical Exam   Temp: 98.9  F (37.2  C) Temp src: Oral BP: 116/75 Pulse: 85 Heart Rate: 84 Resp: 16 SpO2: 98 % O2 Device: None (Room air)    Vitals:    05/17/17 1342   Weight: 74.8 kg (165 lb)     Vital Signs with Ranges  Temp:  [97.8  F (36.6  C)-98.9  F (37.2  C)] 98.9  F (37.2  C)  Pulse:  [62-85] 85  Heart Rate:  [61-84] 84  Resp:  [16-18] 16  BP: ()/(47-75) 116/75  SpO2:  [94 %-98 %] 98 %    Constitutional: Awake, alert, cooperative, no apparent distress  Lungs: Clear to auscultation bilaterally, no crackles or wheezing  Cardiovascular: Regular rate and rhythm, normal S1 and S2, and no murmur noted  Abdomen: Normal bowel sounds, soft, non-distended, non-tender  Skin: No rashes, no cyanosis, no edema  Other:    Medications        ceFAZolin  2 g Intravenous Q8H     polyethylene glycol  17 g Oral Daily     sennosides  2 tablet Oral Daily     nicotine   Transdermal Q8H     nicotine   Transdermal Daily     nicotine  1 patch Transdermal  Daily     gabapentin  300 mg Oral BID     sodium chloride (PF)  3 mL Intracatheter Q8H     acetaminophen  975 mg Oral Q8H       Data   All microbiology laboratory data reviewed.  Recent Labs   Lab Test  05/19/17   0710  05/18/17   0750  05/17/17   1440  05/17/17   1237   WBC   --   5.7  9.8  9.4   HGB  12.6*  12.2*  14.0  14.0   HCT   --   35.7*  40.1  40.8   MCV   --   86  86  86   PLT   --   155  143*  152     Recent Labs   Lab Test  05/20/17   0657  05/19/17   0710  05/18/17   0750   CR  0.79  0.87  0.77     Recent Labs   Lab Test  05/17/17   1237   SED  29*     Recent Labs   Lab Test  05/17/17   1752  05/17/17   1535  05/17/17   1440  05/15/17   1213  05/15/17   1133  01/31/17   1657  01/31/17   1656  01/20/17   1040   CULT  Moderate growth Staphylococcus aureus*  Culture negative monitoring continues  No growth after 3 days  No growth after 3 days  No growth after 5 days  No growth after 5 days  No anaerobes isolated  No growth  No anaerobes isolated  No growth  No growth

## 2017-05-20 NOTE — PROGRESS NOTES
"Plastic Surgery     Vac placed yesterday.  Reports pain with loss of suction.  BP 93/58 (BP Location: Left arm)  Pulse 75  Temp 97.8  F (36.6  C) (Oral)  Resp 18  Ht 1.702 m (5' 7\")  Wt 74.8 kg (165 lb)  SpO2 95%  BMI 25.84 kg/m2  Alert, ambulating  Vac in place and functioning  Cx - S. Aureus only resistant to PCN on Vanco now    SW to begin planning home vac changes q MWF  Abx per ID  Patient will stay until Monday for first vac dressing change - pain has been an issue.      "

## 2017-05-20 NOTE — PROGRESS NOTES
[facility]    Hospitalist Progress Note      Assessment & Plan   Ko Bell is a 37 year old male who was admitted on 5/17/2017.  Past history of bipolar disorder and recent diskectomy who presents with infected seroma.    Sepsis due to MSSA postoperative wound infection s/p I&D 5/17.  Presents reporting fever at home, tachycardic upon arrival with evidence of infected seroma and elevated inflammatory markers.  No other SIRS criteria present.  Underwent I&D by Plastic Surgery 5/17.  Surgical culture growing MSSA.  - post-op management including pain control and DVT prophylaxis per Plastic Surgery  - stop vanco, start Ancef per ID, will transition to Bactrim at discharge  - wound VAC placed 5/19, will have first change on Monday    Acute blood loss anemia:  Pre-op hgb 14 stabilized about 12 g/dL post-op.      Constipation:  States he normally moves bowels once weekly, but now feeling constipated on narcotics.  - increase senna and miralax to bid    DVT Prophylaxis: Pneumatic Compression Devices  Code Status: Full Code    Disposition: Expected discharge on 5/22.    Edgar Garcia    Interval History   Increased pain with wound vac placement, no fever/chills, dyspnea, chest pressure.  No BM yet.    -Data reviewed today: I reviewed all new labs and imaging results over the last 24 hours. I personally reviewed no images or EKG's today.    Physical Exam   Temp: 97.9  F (36.6  C) Temp src: Oral BP: 107/64 Pulse: 70 Heart Rate: 70 Resp: 16 SpO2: 95 % O2 Device: None (Room air)    Vitals:    05/17/17 1342   Weight: 74.8 kg (165 lb)     Vital Signs with Ranges  Temp:  [97.8  F (36.6  C)-98.9  F (37.2  C)] 97.9  F (36.6  C)  Pulse:  [65-85] 70  Heart Rate:  [70-84] 70  Resp:  [16-18] 16  BP: ()/(58-75) 107/64  SpO2:  [95 %-98 %] 95 %  I/O last 3 completed shifts:  In: 300 [P.O.:300]  Out: -     Constitutional: Well developed, well nourished male lying in bed  Respiratory: Lungs clear to ausculation  bilaterally without crackles or wheezes, no tachypnea  Cardiovascular: regular rate and rhythm, normal S1/S2 without murmur, rubs or gallops  GI: abdomen soft, normal bowel sounds  Skin/Integumen:  Wound vac in place over right lower back  Other:  alert and appropriate, cranial nerves grossly intact    Medications        ceFAZolin  2 g Intravenous Q8H     sennosides  2 tablet Oral BID     polyethylene glycol  17 g Oral BID     nicotine   Transdermal Q8H     nicotine   Transdermal Daily     nicotine  1 patch Transdermal Daily     gabapentin  300 mg Oral BID     sodium chloride (PF)  3 mL Intracatheter Q8H     acetaminophen  975 mg Oral Q8H       Data     Recent Labs  Lab 05/20/17  0657 05/19/17  0710 05/18/17  0750 05/17/17  1440  05/17/17  1237   WBC  --   --  5.7 9.8  --  9.4   HGB  --  12.6* 12.2* 14.0  --  14.0   MCV  --   --  86 86  --  86   PLT  --   --  155 143*  --  152   INR  --   --   --  1.03  --   --    NA  --   --  140 136  --   --    POTASSIUM  --   --  4.1 4.2  --   --    CHLORIDE  --   --  107 101  --   --    CO2  --   --  28 29  --   --    BUN  --   --  8 11  --   --    CR 0.79 0.87 0.77 0.76  < >  --    ANIONGAP  --   --  5 6  --   --    OLAMIDE  --   --  8.2* 9.1  --   --    GLC  --  110* 100* 96  --   --    ALBUMIN  --   --   --  3.1*  --   --    PROTTOTAL  --   --   --  7.1  --   --    BILITOTAL  --   --   --  0.4  --   --    ALKPHOS  --   --   --  80  --   --    ALT  --   --   --  31  --   --    AST  --   --   --  21  --   --    < > = values in this interval not displayed.    No results found for this or any previous visit (from the past 24 hour(s)).

## 2017-05-20 NOTE — PLAN OF CARE
Problem: Goal Outcome Summary  Goal: Goal Outcome Summary  Outcome: No Change  Pt has been up independent takes wound vac with to walk,  Dressing intact to back sponge wafer and tegraderm.  Pt reports prn oxycodone and scheduled tylenol sufficient for pain relief.  On iv antibiotics see emar.

## 2017-05-20 NOTE — PLAN OF CARE
Problem: Goal Outcome Summary  Goal: Goal Outcome Summary  Outcome: No Change  Vss, AOx4. Pain in surgical site in back at 8/10, managed with oxycodone. Pt is independent, regular diet. Wound vac placed today, cdi.

## 2017-05-20 NOTE — PLAN OF CARE
Problem: Goal Outcome Summary  Goal: Goal Outcome Summary  Outcome: No Change  A&Ox4.  AVSS.  Up independently.  Oxycodone and Tylenol for pain.  Negative pressure wound therapy to lower back, CDI, suction at 125.  IS at 2000.  Voiding with no issues.  Nicotine patch in place.

## 2017-05-21 LAB
BACTERIA SPEC CULT: NO GROWTH
BACTERIA SPEC CULT: NO GROWTH
CREAT SERPL-MCNC: 0.74 MG/DL (ref 0.66–1.25)
GFR SERPL CREATININE-BSD FRML MDRD: NORMAL ML/MIN/1.7M2
Lab: NORMAL
Lab: NORMAL
MICRO REPORT STATUS: NORMAL
MICRO REPORT STATUS: NORMAL
SPECIMEN SOURCE: NORMAL
SPECIMEN SOURCE: NORMAL

## 2017-05-21 PROCEDURE — 25000132 ZZH RX MED GY IP 250 OP 250 PS 637: Performed by: PLASTIC SURGERY

## 2017-05-21 PROCEDURE — 99207 ZZC CDG-MDM COMPONENT: MEETS MODERATE - UP CODED: CPT | Performed by: HOSPITALIST

## 2017-05-21 PROCEDURE — 82565 ASSAY OF CREATININE: CPT | Performed by: PLASTIC SURGERY

## 2017-05-21 PROCEDURE — 25000128 H RX IP 250 OP 636: Performed by: INTERNAL MEDICINE

## 2017-05-21 PROCEDURE — 36415 COLL VENOUS BLD VENIPUNCTURE: CPT | Performed by: PLASTIC SURGERY

## 2017-05-21 PROCEDURE — 12000007 ZZH R&B INTERMEDIATE

## 2017-05-21 PROCEDURE — 99232 SBSQ HOSP IP/OBS MODERATE 35: CPT | Performed by: HOSPITALIST

## 2017-05-21 PROCEDURE — 25000132 ZZH RX MED GY IP 250 OP 250 PS 637: Performed by: HOSPITALIST

## 2017-05-21 RX ORDER — POLYETHYLENE GLYCOL 3350 17 G/17G
17 POWDER, FOR SOLUTION ORAL 2 TIMES DAILY PRN
Status: DISCONTINUED | OUTPATIENT
Start: 2017-05-21 | End: 2017-05-22 | Stop reason: HOSPADM

## 2017-05-21 RX ADMIN — OXYCODONE HYDROCHLORIDE 10 MG: 5 TABLET ORAL at 01:14

## 2017-05-21 RX ADMIN — OXYCODONE HYDROCHLORIDE 10 MG: 5 TABLET ORAL at 08:47

## 2017-05-21 RX ADMIN — GABAPENTIN 300 MG: 300 CAPSULE ORAL at 08:43

## 2017-05-21 RX ADMIN — CEFAZOLIN SODIUM 2 G: 2 INJECTION, SOLUTION INTRAVENOUS at 10:51

## 2017-05-21 RX ADMIN — CEFAZOLIN SODIUM 2 G: 2 INJECTION, SOLUTION INTRAVENOUS at 03:50

## 2017-05-21 RX ADMIN — SENNOSIDES 2 TABLET: 8.6 TABLET, FILM COATED ORAL at 22:20

## 2017-05-21 RX ADMIN — OXYCODONE HYDROCHLORIDE 10 MG: 5 TABLET ORAL at 11:54

## 2017-05-21 RX ADMIN — OXYCODONE HYDROCHLORIDE 10 MG: 5 TABLET ORAL at 15:10

## 2017-05-21 RX ADMIN — CEFAZOLIN SODIUM 2 G: 2 INJECTION, SOLUTION INTRAVENOUS at 18:46

## 2017-05-21 RX ADMIN — GABAPENTIN 300 MG: 300 CAPSULE ORAL at 22:20

## 2017-05-21 RX ADMIN — NICOTINE 1 PATCH: 14 PATCH, EXTENDED RELEASE TRANSDERMAL at 08:43

## 2017-05-21 RX ADMIN — OXYCODONE HYDROCHLORIDE 10 MG: 5 TABLET ORAL at 19:04

## 2017-05-21 RX ADMIN — OXYCODONE HYDROCHLORIDE 10 MG: 5 TABLET ORAL at 22:20

## 2017-05-21 NOTE — PLAN OF CARE
Problem: Goal Outcome Summary  Goal: Goal Outcome Summary  Outcome: No Change  A&Ox4.  AVSS.  Oxycodone for pain.  Negative pressure wound therapy to lower back at 125, dressing CDI.  Up independently.  Voiding freely.  Nicotine patch in place.  Slept good in between care.

## 2017-05-21 NOTE — PROGRESS NOTES
[facility]    Hospitalist Progress Note      Assessment & Plan   Ko Bell is a 37 year old male who was admitted on 5/17/2017.  Past history of bipolar disorder and recent diskectomy who presents with infected seroma.    Sepsis due to MSSA postoperative wound infection s/p I&D 5/17.  Presents reporting fever at home, tachycardic upon arrival with evidence of infected seroma and elevated inflammatory markers.  No other SIRS criteria present.  Underwent I&D by Plastic Surgery 5/17.  Surgical culture growing MSSA.  - post-op management including pain control and DVT prophylaxis per Plastic Surgery  - continue Ancef per ID, will transition to Bactrim at discharge  - wound VAC placed 5/19, will have first change on Monday    Acute blood loss anemia:  Pre-op hgb 14 stabilized about 12 g/dL post-op.      Constipation:  States he normally moves bowels once weekly.  - feels he is on his normal schedule and not feeling constipated  - will continue scheduled senna given narcotic use  - make miralax prn    DVT Prophylaxis: Pneumatic Compression Devices  Code Status: Full Code    Disposition: Expected discharge on 5/22 following vac exchange.    Edgar Garcia    Interval History   Moved bowels, does not feel constipated.  No fever/chills or other complaints.  Pain is controlled.    -Data reviewed today: I reviewed all new labs and imaging results over the last 24 hours. I personally reviewed no images or EKG's today.    Physical Exam   Temp: 98.1  F (36.7  C) Temp src: Oral BP: 112/75 Pulse: 65 Heart Rate: 64 Resp: 16 SpO2: 95 % O2 Device: None (Room air)    Vitals:    05/17/17 1342   Weight: 74.8 kg (165 lb)     Vital Signs with Ranges  Temp:  [98.1  F (36.7  C)-98.9  F (37.2  C)] 98.1  F (36.7  C)  Pulse:  [65-80] 65  Heart Rate:  [64-79] 64  Resp:  [16-18] 16  BP: (104-123)/(60-75) 112/75  SpO2:  [94 %-98 %] 95 %  I/O last 3 completed shifts:  In: 840 [P.O.:740; I.V.:100]  Out: -      Constitutional: Well developed, well nourished male in no acute distress  Respiratory: Lungs clear to ausculation bilaterally without crackles or wheezes, no tachypnea  Cardiovascular: regular rate and rhythm, normal S1/S2 without murmur, rubs or gallops  GI:   Skin/Integumen:  Wound vac not examined today  Other:  alert and appropriate, cranial nerves grossly intact    Medications        ceFAZolin  2 g Intravenous Q8H     sennosides  2 tablet Oral BID     nicotine   Transdermal Q8H     nicotine   Transdermal Daily     nicotine  1 patch Transdermal Daily     gabapentin  300 mg Oral BID     sodium chloride (PF)  3 mL Intracatheter Q8H       Data     Recent Labs  Lab 05/21/17  0650 05/20/17  0657 05/19/17  0710 05/18/17  0750 05/17/17  1440  05/17/17  1237   WBC  --   --   --  5.7 9.8  --  9.4   HGB  --   --  12.6* 12.2* 14.0  --  14.0   MCV  --   --   --  86 86  --  86   PLT  --   --   --  155 143*  --  152   INR  --   --   --   --  1.03  --   --    NA  --   --   --  140 136  --   --    POTASSIUM  --   --   --  4.1 4.2  --   --    CHLORIDE  --   --   --  107 101  --   --    CO2  --   --   --  28 29  --   --    BUN  --   --   --  8 11  --   --    CR 0.74 0.79 0.87 0.77 0.76  < >  --    ANIONGAP  --   --   --  5 6  --   --    OLAMIDE  --   --   --  8.2* 9.1  --   --    GLC  --   --  110* 100* 96  --   --    ALBUMIN  --   --   --   --  3.1*  --   --    PROTTOTAL  --   --   --   --  7.1  --   --    BILITOTAL  --   --   --   --  0.4  --   --    ALKPHOS  --   --   --   --  80  --   --    ALT  --   --   --   --  31  --   --    AST  --   --   --   --  21  --   --    < > = values in this interval not displayed.    No results found for this or any previous visit (from the past 24 hour(s)).

## 2017-05-21 NOTE — PLAN OF CARE
Problem: Pain, Acute (Adult)  Goal: Identify Related Risk Factors and Signs and Symptoms  Related risk factors and signs and symptoms are identified upon initiation of Human Response Clinical Practice Guideline (CPG)   Outcome: Improving  A&Ox4. AVSS. Oxycodone for pain. Negative pressure wound therapy to lower back at 125, dressing CDI. Up independently. Voiding freely. Nicotine patch in place. Pt plans for discharge on Monday 5/22. Family at bedside visiting.

## 2017-05-21 NOTE — PLAN OF CARE
Problem: Pain, Acute (Adult)  Goal: Identify Related Risk Factors and Signs and Symptoms  Related risk factors and signs and symptoms are identified upon initiation of Human Response Clinical Practice Guideline (CPG)   Outcome: Improving  A&Ox4. AVSS. Up independently. Oxycodone and Tylenol for pain. Negative pressure wound therapy to lower back, CDI, suction at 125.  Nicotine patch in place.

## 2017-05-22 VITALS
WEIGHT: 165 LBS | SYSTOLIC BLOOD PRESSURE: 123 MMHG | TEMPERATURE: 98 F | HEART RATE: 75 BPM | DIASTOLIC BLOOD PRESSURE: 67 MMHG | OXYGEN SATURATION: 97 % | BODY MASS INDEX: 25.9 KG/M2 | RESPIRATION RATE: 16 BRPM | HEIGHT: 67 IN

## 2017-05-22 LAB
CREAT SERPL-MCNC: 0.8 MG/DL (ref 0.66–1.25)
GFR SERPL CREATININE-BSD FRML MDRD: NORMAL ML/MIN/1.7M2

## 2017-05-22 PROCEDURE — 25000132 ZZH RX MED GY IP 250 OP 250 PS 637: Performed by: HOSPITALIST

## 2017-05-22 PROCEDURE — 25000128 H RX IP 250 OP 636: Performed by: INTERNAL MEDICINE

## 2017-05-22 PROCEDURE — 25000132 ZZH RX MED GY IP 250 OP 250 PS 637: Performed by: PLASTIC SURGERY

## 2017-05-22 PROCEDURE — 99239 HOSP IP/OBS DSCHRG MGMT >30: CPT | Performed by: INTERNAL MEDICINE

## 2017-05-22 PROCEDURE — 36415 COLL VENOUS BLD VENIPUNCTURE: CPT | Performed by: PLASTIC SURGERY

## 2017-05-22 PROCEDURE — 97605 NEG PRS WND THER DME<=50SQCM: CPT

## 2017-05-22 PROCEDURE — 82565 ASSAY OF CREATININE: CPT | Performed by: PLASTIC SURGERY

## 2017-05-22 PROCEDURE — 99213 OFFICE O/P EST LOW 20 MIN: CPT

## 2017-05-22 PROCEDURE — 25000125 ZZHC RX 250: Performed by: PLASTIC SURGERY

## 2017-05-22 RX ORDER — NICOTINE 21 MG/24HR
1 PATCH, TRANSDERMAL 24 HOURS TRANSDERMAL EVERY 24 HOURS
Qty: 30 PATCH | Refills: 0 | Status: SHIPPED | OUTPATIENT
Start: 2017-05-22 | End: 2017-06-21

## 2017-05-22 RX ORDER — SULFAMETHOXAZOLE AND TRIMETHOPRIM 400; 80 MG/1; MG/1
1 TABLET ORAL 2 TIMES DAILY
Qty: 20 TABLET | Refills: 0 | Status: SHIPPED | OUTPATIENT
Start: 2017-05-22 | End: 2017-06-07

## 2017-05-22 RX ORDER — LIDOCAINE HYDROCHLORIDE 40 MG/ML
SOLUTION TOPICAL EVERY 4 HOURS PRN
Qty: 50 ML | Refills: 0 | Status: SHIPPED | OUTPATIENT
Start: 2017-05-22 | End: 2017-06-07

## 2017-05-22 RX ORDER — LIDOCAINE HYDROCHLORIDE 40 MG/ML
SOLUTION TOPICAL EVERY 4 HOURS PRN
Status: DISCONTINUED | OUTPATIENT
Start: 2017-05-22 | End: 2017-05-22 | Stop reason: HOSPADM

## 2017-05-22 RX ADMIN — NICOTINE 1 PATCH: 14 PATCH, EXTENDED RELEASE TRANSDERMAL at 08:25

## 2017-05-22 RX ADMIN — OXYCODONE HYDROCHLORIDE 10 MG: 5 TABLET ORAL at 03:49

## 2017-05-22 RX ADMIN — GABAPENTIN 300 MG: 300 CAPSULE ORAL at 08:25

## 2017-05-22 RX ADMIN — CEFAZOLIN SODIUM 2 G: 2 INJECTION, SOLUTION INTRAVENOUS at 12:30

## 2017-05-22 RX ADMIN — OXYCODONE HYDROCHLORIDE 10 MG: 5 TABLET ORAL at 01:05

## 2017-05-22 RX ADMIN — SENNOSIDES 2 TABLET: 8.6 TABLET, FILM COATED ORAL at 08:25

## 2017-05-22 RX ADMIN — CEFAZOLIN SODIUM 2 G: 2 INJECTION, SOLUTION INTRAVENOUS at 03:49

## 2017-05-22 RX ADMIN — OXYCODONE HYDROCHLORIDE 10 MG: 5 TABLET ORAL at 10:42

## 2017-05-22 RX ADMIN — LIDOCAINE HYDROCHLORIDE 40 ML: 40 SOLUTION TOPICAL at 12:05

## 2017-05-22 RX ADMIN — OXYCODONE HYDROCHLORIDE 10 MG: 5 TABLET ORAL at 07:20

## 2017-05-22 NOTE — DISCHARGE SUMMARY
[facility]    Discharge Summary  Hospitalist    Date of Admission:  5/17/2017  Date of Discharge:  5/22/2017  Discharging Provider: Dipika Morgan MD  Date of Service (when I saw the patient): 05/22/17    Discharge Diagnoses   Sepsis due to MSSA postoperative wound infection s/p I&D 5/17/17  Postoperative anemia  Chronic narcotic use  Chronic constipation    History of Present Illness   Please see admission H&P for full details.    Hospital Course   Ko Bell was admitted on 5/17/2017.  The following problems were addressed during his hospitalization:    Sepsis due to MSSA postoperative wound infection s/p I&D 5/17. Presents reporting fever at home, tachycardic upon arrival with evidence of infected seroma and elevated inflammatory markers. No other SIRS criteria present. Underwent I&D by Plastic Surgery 5/17. Surgical culture growing MSSA. Post-op management including pain control and DVT prophylaxis per Plastic Surgery. Appreciate ID consult, continued on Ancef while admitted. After discussion with surgery and ID, felt to be superficial infection only thus ID recommends Bactrim at discharge for 10 more days. Wound VAC placed 5/19, and changed prior to discharge day. Vital signs stable prior to discharge and remains afebrile/tolerating oral intake/ambulating independently. Care coordinators arranged for pt to follow up with wound clinic near his residence for ongoing cares.     Acute blood loss anemia: Pre-op hgb 14 stabilized about 12 g/dL post-op. Stable.     Constipation:  States he normally moves bowels once weekly. While admitted he feels he is on his normal schedule and not feeling constipated. Continue scheduled senna given narcotic use while admitted.    Active Problems:    Sepsis (H)    Wound infection      Dipika Morgan MD    Significant Results and Procedures   Please see operative note 5/17 for full details.    Pending Results   These results will be followed up by PCP  and wound clinic.  Unresulted Labs Ordered in the Past 30 Days of this Admission     Date and Time Order Name Status Description    5/17/2017 1755 Anaerobic bacterial culture Preliminary     5/17/2017 1448 Blood culture Preliminary     5/17/2017 1432 Blood culture Preliminary           Code Status   Full Code       Primary Care Physician   Bethany James    Physical Exam   Temp: 98  F (36.7  C) Temp src: Oral BP: 123/67 Pulse: 75 Heart Rate: 71 Resp: 16 SpO2: 97 % O2 Device: None (Room air)    Vitals:    05/17/17 1342   Weight: 74.8 kg (165 lb)     Vital Signs with Ranges  Temp:  [98  F (36.7  C)-98.5  F (36.9  C)] 98  F (36.7  C)  Pulse:  [66-75] 75  Heart Rate:  [71-75] 71  Resp:  [16] 16  BP: ()/(63-69) 123/67  SpO2:  [95 %-97 %] 97 %  I/O last 3 completed shifts:  In: 300 [P.O.:300]  Out: -     Constitutional: Awake, alert, cooperative, no apparent distress  Respiratory: Clear to auscultation bilaterally, no crackles or wheezing  Cardiovascular: Regular rate and rhythm, normal S1 and S2, and no murmur noted  GI: Normal bowel sounds, soft, non-distended, non-tender  Skin/Integumen: No rashes, no cyanosis, no edema. Wound vac in place on lower back, no leaks noted.    Discharge Disposition   Discharged to home  Condition at discharge: Satisfactory    Consultations This Hospital Stay   PHARMACY TO DOSE VANCO  INFECTIOUS DISEASES IP CONSULT  PHARMACY TO DOSE VANCO  PHARMACY TO DOSE VANCO  INFECTIOUS DISEASES IP CONSULT  SOCIAL WORK IP CONSULT  WOUND OSTOMY CONTINENCE NURSE  IP CONSULT    Time Spent on this Encounter   IDipika, personally saw the patient today and spent greater than 30 minutes discharging this patient.    Discharge Orders     Home care nursing referral     Reason for your hospital stay   Back wound infection     Follow-up and recommended labs and tests    7-10 days w/ Dr. Palma.  Call [phone].   Daily dressing changes as instructed.    You have a follow up appointment  scheduled for Wednesday, May 31 at 11:15 with Dr. Palma in [place]     MD face to face encounter   Documentation of Face to Face and Certification for Home Health Services    I certify that patient: Ko Bell is under my care and that I, or a nurse practitioner or physician's assistant working with me, had a face-to-face encounter that meets the physician face-to-face encounter requirements with this patient on: 5/19/2017.    This encounter with the patient was in whole, or in part, for the following medical condition, which is the primary reason for home health care: postop wound infection lower back.    I certify that, based on my findings, the following services are medically necessary home health services: Nursing.    My clinical findings support the need for the above services because: Nurse is needed: For complex aftercare of surgical procedures because the patient needs instruction and cannot perform care on their own due to: difficult location on the body.      Based on the above findings. I certify that this patient is confined to the home and needs intermittent skilled nursing care, physical therapy and/or speech therapy.  The patient is under my care, and I have initiated the establishment of the plan of care.  This patient will be followed by a physician who will periodically review the plan of care.  Physician/Provider to provide follow up care: Bethany James    Attending hospital physician (the Medicare certified [place] provider): Wayne Palma  Physician Signature: See electronic signature associated with these discharge orders.  Date: 5/19/2017     Reason for your hospital stay   Further management of an infected back wound.     Follow-up and recommended labs and tests    Follow up with primary care provider, Bethany James, within 7 days for hospital follow- up.  No follow up labs or test are needed.  Follow up with wound clinic as previously scheduled.     Activity   Your  activity upon discharge: activity as tolerated and no operation of machinery while taking narcotics.     Full Code     Diet   Follow this diet upon discharge: Orders Placed This Encounter     Combination Diet Regular Diet Adult       Discharge Medications   Current Discharge Medication List      START taking these medications    Details   sulfamethoxazole-trimethoprim (BACTRIM/SEPTRA) 400-80 MG per tablet Take 1 tablet by mouth 2 times daily  Qty: 20 tablet, Refills: 0    Associated Diagnoses: Wound infection      nicotine (NICODERM CQ) 14 MG/24HR 24 hr patch Place 1 patch onto the skin every 24 hours  Qty: 30 patch, Refills: 0    Associated Diagnoses: Tobacco use disorder      lidocaine (XYLOCAINE) 4 % solution Apply topically every 4 hours as needed for moderate pain  Qty: 50 mL, Refills: 0    Associated Diagnoses: Wound infection         CONTINUE these medications which have CHANGED    Details   oxyCODONE HCl (OXECTA) 5 MG TABA Take 5-10 mg by mouth every 4 hours as needed  Qty: 50 each, Refills: 0    Associated Diagnoses: Wound infection         CONTINUE these medications which have NOT CHANGED    Details   Morphine Sulfate (MS CONTIN PO) Take 30 mg by mouth 2 times daily as needed for moderate to severe pain      gabapentin (NEURONTIN) 300 MG capsule Take 1 capsule (300 mg) for 5 days, then increase to 2 capsules (600 mg). For sleep, pain, anger, mood, anxiety.  Qty: 90 capsule, Refills: 1    Associated Diagnoses: Mood disorder (H)         STOP taking these medications       CEPHALEXIN PO Comments:   Reason for Stopping:         CLINDAMYCIN HCL PO Comments:   Reason for Stopping:         LEVOFLOXACIN PO Comments:   Reason for Stopping:             Allergies   No Known Allergies  Data   Most Recent 3 CBC's:  Recent Labs   Lab Test  05/19/17   0710  05/18/17   0750  05/17/17   1440  05/17/17   1237   WBC   --   5.7  9.8  9.4   HGB  12.6*  12.2*  14.0  14.0   MCV   --   86  86  86   PLT   --   155  143*  152       Most Recent 3 BMP's:  Recent Labs   Lab Test  05/22/17   0705  05/21/17   0650  05/20/17   0657  05/19/17   0710  05/18/17   0750  05/17/17   1440  04/11/17   0802   NA   --    --    --    --   140  136  140   POTASSIUM   --    --    --    --   4.1  4.2  3.9   CHLORIDE   --    --    --    --   107  101  105   CO2   --    --    --    --   28  29  26   BUN   --    --    --    --   8  11  16   CR  0.80  0.74  0.79  0.87  0.77  0.76  0.90   ANIONGAP   --    --    --    --   5  6  9   OLAMIDE   --    --    --    --   8.2*  9.1  9.0   GLC   --    --    --   110*  100*  96  96     Most Recent 2 LFT's:  Recent Labs   Lab Test  05/17/17   1440  04/11/17   0802   AST  21  19   ALT  31  33   ALKPHOS  80  102   BILITOTAL  0.4  0.3     Most Recent INR's and Anticoagulation Dosing History:  Anticoagulation Dose History     Recent Dosing and Labs Latest Ref Rng & Units 1/31/2017 5/17/2017    INR 0.86 - 1.14 0.94 1.03        Most Recent 3 Troponin's:No lab results found.  Most Recent Cholesterol Panel:No lab results found.  Most Recent 6 Bacteria Isolates From Any Culture (See EPIC Reports for Culture Details):  Recent Labs   Lab Test  05/17/17   1752  05/17/17   1535  05/17/17   1440  05/15/17   1213  05/15/17   1133  01/31/17   1657   CULT  Moderate growth Staphylococcus aureus*  Culture negative monitoring continues  No growth after 5 days  No growth after 5 days  No growth  No growth  No anaerobes isolated  No growth     Most Recent TSH, T4 and A1c Labs:No lab results found.  Results for orders placed or performed during the hospital encounter of 01/20/17   POC US SOFT TISSUE    Impression    Limited Bedside Soft Tissue Ultrasound    Performed by: Dr. Joseph  Indication: Incisional swelling and pain; evaluate for abscess  Body area(s) imaged: Lumbar back  Findings: Simple appearing soft tissue fluid collection at depth  ranging 0.5-1.0cm along length of incision.  No area clearly  tracking deeper.    Impression: Post operative  fluid collection

## 2017-05-22 NOTE — PLAN OF CARE
Problem: Goal Outcome Summary  Goal: Goal Outcome Summary  Outcome: No Change  VSS. Oxycodone for pain. Wound vac intact at 125, dressing CDI. Voiding. Independent. Plan to d/c today after wound vac change.

## 2017-05-22 NOTE — PROGRESS NOTES
Signed and completed wound vac orders and supporting documents faxed to [facility] express at a [phone].

## 2017-05-22 NOTE — PROGRESS NOTES
[facility] Nurse Inpatient Wound Assessment   -[facility] VAC Dressing change     Follow Up Assessment of wound(s) on pt's:   Midline, lower, lumbar back        Data:   Patient History:      per MD note(s):  Presents reporting fever at home, tachycardic upon arrival with evidence of infected seroma and elevated inflammatory markers. No other SIRS criteria present. Underwent I&D by Plastic Surgery . Surgical culture growing MSSA.  - post-op management including pain control and DVT prophylaxis per Plastic Surgery  - continue Ancef per ID, will transition to Bactrim at discharge     Moisture Management:  [facility] Wound VAC dressing    Current Diet / Nutrition:           Active Diet Order      Combination Diet Regular Diet Adult      Diet           Dewey Assessment and sub scores:   Dewey Score  Av.6  Min: 19  Max: 23     Labs:                                                                   Recent Labs   Lab Test  17   0710  17   0750  17   1440  17   1237   ALBUMIN   --    --   3.1*   --    HGB  12.6*  12.2*  14.0  14.0   RBC   --   4.14*  4.67  4.76   WBC   --   5.7  9.8  9.4   PLT   --   155  143*  152   INR   --    --   1.03   --    CRP   --    --    --   140.0*     Wound Assessment (location):   Midline, lower, lumbar back  Wound History:  Per pt has had numerous procedures in the wound area, starting the beginning of 2017    Age of wound/ surgical date: May 2017    Date [facility] VAC placed: May 19, 2017      [facility] Wound VAC initiated by:  [facility] Nurse: Yes  MD: Dr. Palma, plastic surgery    Any other wound therapies tried prior to [facility] VAC placed? Saline dressings  Wound Assessment (location): Lumbar spine   Wound History: Pt states has had ongoing issues here for past couple of months; had recent diskectomy. Now s/p I&D of infected seroma 17 by Dr. aPlma.     Wound Base: nearly 100% red moist tissue, tiny area of white fibrous tissue found in base of wound at  about 10oclock- this spot measures about 0.3cm x 0.2cm    Specific Dimensions (length x width x depth, in cm) : 6.3 x 2.3 x 2.2 cm, very clean defined edges; very regular cookie-cutter shape    Tunneling: no tunneling noted, base intact    Undermining: N/A    Palpation of the wound bed: normal    Slough appearance: none    Eschar appearance: none    Periwound Skin: intact, slightly indurated left > right side    Color: normal and consistent with surrounding tissue, with slight erythema that looks related to tape irritation    Temperature normal     Drainage: Moderate serosang     Odor: none    Pain: moderate     Periwound Skin: intact,  With a large are of induration to the left from about 8-10oclock and extending out a few centimeters.  It is this area of induration that the pt states is especially painful  ? Color: normal and consistent with surrounding tissue  ? Temperature  normal     Drainage:  Small, serosanginous     Odor: none    Pain:  Minimal pain, especially with the use of the topical, liquid lidocaine into the wound prior to dressing change  ? Was patient premedicated prior to dressing change? yes  ? Medication(s) used:  Pt had oral oxycodone, was still stating having pain of 5-6/ 10 prior to my instilling the lidocaine into the wound bed.  Then very little pain with dressing change upon use of the lidocaine.           Intervention:     Patient's chart evaluated.      Wound was fully assessed.    Wound Care: was done: wound cleansed with MicroKlenz, skin prep to periwound tissue, filled deficit and create bridge with black foam, seal obtained at -125mmHg    Orders  Reviewed    Supplies  Reviewed    Discussed plan of care with Patient, Nurse and Dr. Pavel pleitez for dressing changes.  Macarena Wood re@ woundcare plan          Assessment:       [facility] Wound VAC dressing changes with use of lidocaine for pain management MWF, black foam is a good plan,  Nice granulation tissue developing throughout  wound bed, no obvious s/s of infection         Plan:     Nursing to notify the Provider(s) and re-consult the WOC Nurse if wound(s) deteriorate(s) or if necessary to reevaluate the plan.    Plan of care for wound located on midlumbar spine: [facility] Wound VAC dressing change by the [facility] while in the hospital.  Use liquid 4% lidocaine prior to dressing change, in the wound bed    WOC Nurse will return: KEAGAN    Face to face time: 31-45 Minutes plus vac dressing change

## 2017-05-22 NOTE — DISCHARGE INSTRUCTIONS
Next week Wednesday w/ Dr. Palma.  Call [phone].  Wound care as instructed.    Going Home with A Wound Vac  Usually your doctor, home health agency or wound care clinic trained in VAC therapy will change your dressing. There are specific dressings that need to be used for your VAC dressing change. There are also specific settings that need to be set on the machine at discharge.   When you are discharged, make sure you bring along the box that has been left in your room from the [facility] representative. (You need to leave with a portable VAC machine, not the hospital s.)  **Dressings will be changed according to what your MD has ordered. It is usually 3 times/week or as needed.  **You should not shower ( or get dressing wet) unless this has been approved by your MD.  **Keep the machine plugged in as much as possible to prevent having to recharge a full 12 hours. The battery may run for about 12 hours.    **At home: If there is a problem and the machine registers  air leak , it usually means the dressing is loose.  You may try to patch the dressing with new Tegaderm or clear KCI drape. If this does not work, and Home Care is following the patient, call the Home Care nurse.  If the machine malfunctions, read the manual and/or call [facility] at #[phone]  How to Change the Canister on the Wound VAC  1. Close white clamp on foam dressing tube.  2. Disconnect tubes from each other - hold canister tube up to allow drainage to flow down tube and into canister. Close clamp on canister tube.  3. Press therapy button to off.  4. Push in canister release button at front of machine and remove canister from machine. You may need to jiggle it out.  5. Insert a new canister into machine. Jiggle into place until you hear a click.  6. Hook tubes together, unclamp clamps.  7. Press therapy button; press therapy  on  to restart.      ** If you have any serious bleeding, feel faint or feel dizzy, turn off the machine and call  289.  **You may also need to call your MD for a fever over 102, increase in redness, swelling, bleeding, bad swell, increase in pain warmth around dressing site.  **If you have a question or problem with the bandage call your MD, home care nurse or wound care center.  **If your machine isn t working right and the alarms keep going off: Read about  alarms  in your manual and/or call [facility] at [phone].    Follow up Appointment:  __Next Wednesday with Dr. Palma__________________    Doctor's Phone Number: ____________________

## 2017-05-22 NOTE — PROGRESS NOTES
Plastic Surgery POD# 4    Patient feeling sore as expected    Wound vac intact    Plan: will see if he tolerates wound vac change, if too painful then saline wet to dry

## 2017-05-22 NOTE — PROGRESS NOTES
[facility] wound vac serial number [id number] delivered to patient in his room . Signed proof of delivery forms faxed to [facility] at [phone].  Patient instructed to take wound vac supplies to all appointments.     Discussed dressing changes with Dr. Palma will need to miss Memorial day and that will be okay.  Home care orders discontinued as patient is going to outpatient setting to have dressing changes.       Wound vac dressing changes arranged through [facility] in [place], first appointment on Wednesday at 9 am. Detailed information placed on AVS for patient. Orders and requested chart copies faxed to the clinic at [phone] for continuation or care. I spoke to Julian and to Ifrah at the clinic ([phone])

## 2017-05-22 NOTE — PLAN OF CARE
Problem: Goal Outcome Summary  Goal: Goal Outcome Summary  Outcome: No Change  Pt progressing per POC.  Home wound vac applied. Supplies given.   DC instructions reviewed.  Script for oxycodone given to pt to fill at on pharmacy and at a later date.

## 2017-05-23 ENCOUNTER — TELEPHONE (OUTPATIENT)
Dept: FAMILY MEDICINE | Facility: CLINIC | Age: 37
End: 2017-05-23

## 2017-05-23 LAB
BACTERIA SPEC CULT: NO GROWTH
BACTERIA SPEC CULT: NO GROWTH
Lab: NORMAL
Lab: NORMAL
MICRO REPORT STATUS: NORMAL
MICRO REPORT STATUS: NORMAL
SPECIMEN SOURCE: NORMAL
SPECIMEN SOURCE: NORMAL

## 2017-05-23 NOTE — TELEPHONE ENCOUNTER
Patient discharged from [facility] for inpatient hospital stay on 5/22 for cellulitis, wound infection.    Please contact patient to follow up; no appointment scheduled at this time.    ER / IP:  2/1    Care Coordination:  lynn Colon

## 2017-05-23 NOTE — TELEPHONE ENCOUNTER
"Hospital/TCU/ED for chronic condition Discharge Protocol    \"Hi, my name is Melyssa YAHAIRA Lares, a registered nurse, and I am calling from [facility].  I am calling to follow up and see how things are going for you after your recent emergency visit/hospital/TCU stay.\"    Tell me how you are doing now that you are home?\" \"Doing well and fever is gone, huge gaping wound on my back\". Has wound VAC in place.      Discharge Instructions    \"Let's review your discharge instructions.  What is/are the follow-up recommendations?  Pt. Response: Follow up with surgeon on 5/31/2017 (scheduled), follow up with [facility] on [place] for wound vac changes.    \"Has an appointment with your primary care provider been scheduled?\"   will be switching to [facility] for primary care due to insurance. He can see Bethany for anything work comp related for his back.    \"When you see the provider, I would recommend that you bring your medications with you.\"    Medications    \"Tell me what changed about your medicines when you discharged?\"    Changes to chronic meds?    0-1    \"What questions do you have about your medications?\"    None     New diagnoses of heart failure, COPD, diabetes, or MI?    No              Medication reconciliation completed? Yes  Was MTM referral placed (*Make sure to put transitions as reason for referral)?   No    Call Summary    \"What questions or concerns do you have about your recent visit and your follow-up care?\"     none      Patient advised to follow up with surgeon as scheduled and with [facility] as scheduled for wound VAC changes.  Patient advised to contact surgeon if fever develops, pain worsens, or if any concerns with wound vac or with increased drainage from wound.  He verbalized understanding.  Patient advised to establish care with Dr. Skelton at [facility] within the next few weeks.     \"If you have questions or things don't continue to improve, we encourage you contact us " "through the main clinic number (give number).  Even if the clinic is not open, triage nurses are available 24/7 to help you.     We would like you to know that our clinic has extended hours (provide information).  We also have urgent care (provide details on closest location and hours/contact info)\"      \"Thank you for your time and take care!\"          Melyssa Lares, BS, RN, PHN   [facility] T[facility]        "

## 2017-05-24 LAB
BACTERIA SPEC CULT: NORMAL
Lab: NORMAL
MICRO REPORT STATUS: NORMAL
SPECIMEN SOURCE: NORMAL

## 2017-05-25 ENCOUNTER — MYC MEDICAL ADVICE (OUTPATIENT)
Dept: FAMILY MEDICINE | Facility: CLINIC | Age: 37
End: 2017-05-25

## 2017-05-25 DIAGNOSIS — L08.9 WOUND INFECTION: ICD-10-CM

## 2017-05-25 DIAGNOSIS — T14.8XXA WOUND INFECTION: ICD-10-CM

## 2017-05-25 DIAGNOSIS — G89.18 POSTOPERATIVE PAIN: ICD-10-CM

## 2017-05-25 DIAGNOSIS — G89.28 OTHER CHRONIC POSTPROCEDURAL PAIN: Primary | ICD-10-CM

## 2017-05-25 RX ORDER — MORPHINE SULFATE 30 MG/1
30 TABLET, FILM COATED, EXTENDED RELEASE ORAL EVERY 12 HOURS
Qty: 60 TABLET | Refills: 0 | Status: SHIPPED | OUTPATIENT
Start: 2017-05-26 | End: 2017-06-07

## 2017-06-02 ENCOUNTER — MYC MEDICAL ADVICE (OUTPATIENT)
Dept: FAMILY MEDICINE | Facility: CLINIC | Age: 37
End: 2017-06-02

## 2017-06-05 ENCOUNTER — TELEPHONE (OUTPATIENT)
Dept: FAMILY MEDICINE | Facility: CLINIC | Age: 37
End: 2017-06-05

## 2017-06-05 NOTE — TELEPHONE ENCOUNTER
Please call patient and inquire on current medication use.  He should be taking MS Contin 30 mg BID along with the Oxycodone 5 mg PRN.  How much/how often is he using this RX?

## 2017-06-05 NOTE — TELEPHONE ENCOUNTER
Attempt # 2.    Mychart message sent.    HERMES River, RN, PHN  [facility]

## 2017-06-05 NOTE — TELEPHONE ENCOUNTER
"Since hospital visit:  He did not take morphine in hospital.  MS contin was knocking him out so he prefers NOT to take this.   He has been taking Oxycodone 10 mg every 4 hours.  He does note that it works well for 2- 2.5 hours and then pain comes back. He feels he has a fast metabolism so he \"burns through it really quickly\"  Also on gabapentin 600 mg at hs for sleep, pain and anxiety and this is working very well for him.      Done with antibiotics      Routing back to Bethany Solis RN- Triage [employer]    "

## 2017-06-05 NOTE — TELEPHONE ENCOUNTER
Patient dropped off a prescription for Oxycodone 5mg take 1 to 2 tablets by mouth every 4 hours as needed.  Qty: 50  Written by: Dr Wayne Palma at discharge from hospital (hospitialists do not do PAs)---- Patient states taking this medication this way.    Oxycodone requires a Prior Authorization.  Reason / Alternatives per Insurance rejection: Dosing limit of 8 per day- anything over 8 tablets per day requires PA    Would you like to change the medication or attempt the prior authorization?    Patient's prescription insurance is as follows:  Insurance Company: [employer]   Bin number: [id number]   Phone number: [phone]   ID # [id number]  Group # [id number]  N # [id number]    Please advise and let pharmacy know if and when PA is approved or denied.    -Devora Danielson, Certified Pharmacy Technician, [facility], [facility], Ph. [phone]

## 2017-06-06 NOTE — TELEPHONE ENCOUNTER
Attempt # 1    Left non-detailed VM for patient to call back.    Melyssa Lares, HERMES, RN, PHN  [facility]

## 2017-06-06 NOTE — TELEPHONE ENCOUNTER
Pt just filled morphine RX on 5/26.  Please have him bring this RX (the bottle) into the clinic and make a pain f/u appt with me (he can see me for this because it is work comp) and I can fill his oxycodone at that OV.      Bethany James, MS, PA-C

## 2017-06-07 ENCOUNTER — TELEPHONE (OUTPATIENT)
Dept: FAMILY MEDICINE | Facility: CLINIC | Age: 37
End: 2017-06-07

## 2017-06-07 ENCOUNTER — OFFICE VISIT (OUTPATIENT)
Dept: FAMILY MEDICINE | Facility: CLINIC | Age: 37
End: 2017-06-07
Payer: COMMERCIAL

## 2017-06-07 VITALS
DIASTOLIC BLOOD PRESSURE: 90 MMHG | WEIGHT: 158.5 LBS | HEIGHT: 67 IN | BODY MASS INDEX: 24.88 KG/M2 | HEART RATE: 120 BPM | OXYGEN SATURATION: 98 % | SYSTOLIC BLOOD PRESSURE: 140 MMHG | TEMPERATURE: 98.8 F

## 2017-06-07 DIAGNOSIS — M96.89 POSTOPERATIVE SURGICAL COMPLICATION INVOLVING MUSCULOSKELETAL SYSTEM ASSOCIATED WITH MUSCULOSKELETAL PROCEDURE, UNSPECIFIED COMPLICATION: Primary | ICD-10-CM

## 2017-06-07 DIAGNOSIS — Z02.6 ENCOUNTER RELATED TO WORKER'S COMPENSATION CLAIM: ICD-10-CM

## 2017-06-07 DIAGNOSIS — G89.28 CHRONIC PAIN FOLLOWING SURGERY OR PROCEDURE: ICD-10-CM

## 2017-06-07 PROCEDURE — 99214 OFFICE O/P EST MOD 30 MIN: CPT | Performed by: PHYSICIAN ASSISTANT

## 2017-06-07 RX ORDER — NICOTINE 21 MG/24HR
1 PATCH, TRANSDERMAL 24 HOURS TRANSDERMAL
COMMUNITY
End: 2017-06-07

## 2017-06-07 RX ORDER — OXYCODONE HYDROCHLORIDE 10 MG/1
10 TABLET ORAL EVERY 4 HOURS PRN
Qty: 200 TABLET | Refills: 0 | Status: SHIPPED | OUTPATIENT
Start: 2017-06-07 | End: 2017-07-03

## 2017-06-07 NOTE — TELEPHONE ENCOUNTER
Prescription written on 6/7/17 for Oxycodone 10mg tablet, Qty: 200 with days supply of 34 has been limited by the insurance company to a 30 day supply.  Prescription was changed to 180 tablets to be dispensed.    -Devora Danielson, Certified Pharmacy Technician, [facility], [facility], Ph. [phone]

## 2017-06-07 NOTE — TELEPHONE ENCOUNTER
Pt notes they have been at [facility] and are still leaking spinal fluid, advised to follow up with spine clinic again.     Pt noted they don't need the morphine at this time.     Made appt for pt with LP for this afternoon.    The patient indicates understanding of these issues and agrees with the plan.  Suzanne Blackwell RN

## 2017-06-07 NOTE — LETTER
[address] [address]  [phone]        December 11, 2017    Ko Bell  [facility], [facility] [address] [place] [id number]              Dear Ko Bell    This is to remind you that your non-fasting lab work is due.    You may call our office at [phone] to schedule an appointment.    Please disregard this notice if you have already had your labs drawn or made an appointment.        Sincerely,        Bethany James PA-C

## 2017-06-07 NOTE — NURSING NOTE
"Chief Complaint   Patient presents with     Recheck Medication     pain medication f/u       Initial BP (!) 136/92  Pulse 120  Temp 98.8  F (37.1  C) (Tympanic)  Ht 5' 7\" (1.702 m)  Wt 158 lb 8 oz (71.9 kg)  SpO2 98%  BMI 24.82 kg/m2 Estimated body mass index is 24.82 kg/(m^2) as calculated from the following:    Height as of this encounter: 5' 7\" (1.702 m).    Weight as of this encounter: 158 lb 8 oz (71.9 kg).  Medication Reconciliation: complete   Liya Medina, GEOVANI      "

## 2017-06-07 NOTE — MR AVS SNAPSHOT
After Visit Summary   6/7/2017    Ko Bell    MRN: [id number]           Patient Information     Date Of Birth          1980        Visit Information        Provider Department      6/7/2017 2:20 PM Bethany James PA-C [facility]'s Diagnoses     Postoperative surgical complication involving musculoskeletal system associated with musculoskeletal procedure, unspecified complication    -  1    Chronic pain following surgery or procedure        Encounter related to worker's compensation claim           Follow-ups after your visit        Future tests that were ordered for you today     Open Future Orders        Priority Expected Expires Ordered    Beta 2 transferrin Routine  12/4/2017 6/7/2017            Who to contact     If you have questions or need follow up information about today's clinic visit or your schedule please contact [facility] directly at [phone].  Normal or non-critical lab and imaging results will be communicated to you by MyChart, letter or phone within 4 business days after the clinic has received the results. If you do not hear from us within 7 days, please contact the clinic through MyChart or phone. If you have a critical or abnormal lab result, we will notify you by phone as soon as possible.  Submit refill requests through [email or web address] or call your pharmacy and they will forward the refill request to us. Please allow 3 business days for your refill to be completed.          Additional Information About Your Visit        [employer] Information     [email or web address] gives you secure access to your electronic health record. If you see a primary care provider, you can also send messages to your care team and make appointments. If you have questions, please call your primary care clinic.  If you do not have a primary care provider, please call [phone] and they will assist you.        Care EveryWhere ID     This is your Care  "EveryWhere ID. This could be used by other organizations to access your [facility] medical records  [id number]        Your Vitals Were     Pulse Temperature Height Pulse Oximetry BMI (Body Mass Index)       120 98.8  F (37.1  C) (Tympanic) 5' 7\" (1.702 m) 98% 24.82 kg/m2        Blood Pressure from Last 3 Encounters:   06/07/17 140/90   05/22/17 123/67   05/17/17 110/60    Weight from Last 3 Encounters:   06/07/17 158 lb 8 oz (71.9 kg)   05/17/17 165 lb (74.8 kg)   05/17/17 162 lb (73.5 kg)                 Today's Medication Changes          These changes are accurate as of: 6/7/17  3:39 PM.  If you have any questions, ask your nurse or doctor.               Start taking these medicines.        Dose/Directions    oxyCODONE 10 MG IR tablet   Commonly known as:  ROXICODONE   Used for:  Postoperative surgical complication involving musculoskeletal system associated with musculoskeletal procedure, unspecified complication   Started by:  Bethany James PA-C        Dose:  10 mg   Take 1 tablet (10 mg) by mouth every 4 hours as needed for moderate to severe pain   Quantity:  200 tablet   Refills:  0            Where to get your medicines      Some of these will need a paper prescription and others can be bought over the counter.  Ask your nurse if you have questions.     Bring a paper prescription for each of these medications     oxyCODONE 10 MG IR tablet                Primary Care Provider Office Phone # Fax #    Bethany James PA-C [phone]       [address] [address]        Thank you!     Thank you for choosing [facility]  for your care. Our goal is always to provide you with excellent care. Hearing back from our patients is one way we can continue to improve our services. Please take a few minutes to complete the written survey that you may receive in the mail after your visit with us. Thank you!             Your Updated Medication " List - Protect others around you: Learn how to safely use, store and throw away your medicines at www.disposemymeds.org.          This list is accurate as of: 6/7/17  3:39 PM.  Always use your most recent med list.                   Brand Name Dispense Instructions for use    gabapentin 300 MG capsule    NEURONTIN    90 capsule    Take 1 capsule (300 mg) for 5 days, then increase to 2 capsules (600 mg). For sleep, pain, anger, mood, anxiety.       naloxone nasal spray    NARCAN    0.2 mL    Spray 1 spray (4 mg) into one nostril alternating nostrils as needed for opioid reversal (every 2-3 minutes until assistance arrives.)       nicotine 14 MG/24HR 24 hr patch    NICODERM CQ    30 patch    Place 1 patch onto the skin every 24 hours       oxyCODONE 10 MG IR tablet    ROXICODONE    200 tablet    Take 1 tablet (10 mg) by mouth every 4 hours as needed for moderate to severe pain
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
## 2017-06-13 ENCOUNTER — TRANSFERRED RECORDS (OUTPATIENT)
Dept: PSYCHIATRY | Facility: CLINIC | Age: 37
End: 2017-06-13

## 2017-06-13 NOTE — PROGRESS NOTES
Records received from [facility] Dr Raya Caro from visits 5749-8760.  Previous DX include:   amphetamine use disorder, episodic  Bipolar disorder mixed, severe  Opioid dependence  Panic disorder without agoraphobia  Personality Disorder unspecified  ? adhd- no formal evaluation    Med history:  suboxone  Seroquel (quetiapine)  Lithium  depakote  Valium (diazepam)     Sent for scanning

## 2017-06-20 ENCOUNTER — TELEPHONE (OUTPATIENT)
Dept: FAMILY MEDICINE | Facility: CLINIC | Age: 37
End: 2017-06-20

## 2017-06-20 DIAGNOSIS — L08.9 WOUND INFECTION: ICD-10-CM

## 2017-06-20 DIAGNOSIS — T14.8XXA WOUND INFECTION: ICD-10-CM

## 2017-06-20 RX ORDER — LIDOCAINE HYDROCHLORIDE 40 MG/ML
SOLUTION TOPICAL EVERY 4 HOURS PRN
Qty: 50 ML | Refills: 1 | Status: SHIPPED | OUTPATIENT
Start: 2017-06-20 | End: 2022-11-21

## 2017-06-20 NOTE — TELEPHONE ENCOUNTER
Lidocaine 4% Soln   (Sig: Apply topically every 4 hours as needed for moderate pain)  Written at discharge from hospital.  Last Written Prescription Date: 5.22.17  Last Fill Quantity: 50,  # refills: 0   Last Office Visit with [facility], P or [facility] prescribing provider: 6/7/17    -Devora Danielson, Certified Pharmacy Technician, [facility], [facility], Ph. [phone]

## 2017-08-22 ENCOUNTER — MYC MEDICAL ADVICE (OUTPATIENT)
Dept: FAMILY MEDICINE | Facility: CLINIC | Age: 37
End: 2017-08-22

## 2017-08-22 DIAGNOSIS — G89.28 CHRONIC PAIN FOLLOWING SURGERY OR PROCEDURE: ICD-10-CM

## 2017-08-22 DIAGNOSIS — M96.89 POSTOPERATIVE SURGICAL COMPLICATION INVOLVING MUSCULOSKELETAL SYSTEM ASSOCIATED WITH MUSCULOSKELETAL PROCEDURE, UNSPECIFIED COMPLICATION: ICD-10-CM

## 2017-08-22 NOTE — TELEPHONE ENCOUNTER
Controlled Substance Refill Request for Oxycodone 10 mg, see mychart note  Problem List Complete:  No     PROVIDER TO CONSIDER COMPLETION OF PROBLEM LIST AND OVERVIEW/CONTROLLED SUBSTANCE AGREEMENT    Last Written Prescription Date:  8/4/2017  Last Fill Quantity: 90,   # refills: 0    Last Office Visit with [facility] primary care provider: 6/7/2017    Future Office visit:     Controlled substance agreement on file: No.     Processing:  Staff will hand deliver Rx to on-site pharmacy     checked in past 6 months?  No    HERMES River, RN, N  [facility] [phone]

## 2017-08-24 RX ORDER — OXYCODONE HYDROCHLORIDE 10 MG/1
10 TABLET ORAL EVERY 4 HOURS PRN
Qty: 90 TABLET | Refills: 0 | Status: SHIPPED | OUTPATIENT
Start: 2017-08-24 | End: 2017-08-28

## 2017-08-24 RX ORDER — OXYCODONE HYDROCHLORIDE 10 MG/1
10 TABLET ORAL EVERY 4 HOURS PRN
Qty: 90 TABLET | Refills: 0 | Status: SHIPPED | OUTPATIENT
Start: 2017-08-24 | End: 2017-08-24

## 2017-08-24 NOTE — TELEPHONE ENCOUNTER
Rx not signed.  Will route to LP.    Melyssa Lares, HERMES, RN, [facility]) [phone]

## 2017-08-28 ENCOUNTER — TELEPHONE (OUTPATIENT)
Dept: PALLIATIVE MEDICINE | Facility: CLINIC | Age: 37
End: 2017-08-28

## 2017-08-28 ENCOUNTER — OFFICE VISIT (OUTPATIENT)
Dept: FAMILY MEDICINE | Facility: CLINIC | Age: 37
End: 2017-08-28
Payer: COMMERCIAL

## 2017-08-28 VITALS
BODY MASS INDEX: 24.33 KG/M2 | TEMPERATURE: 98 F | WEIGHT: 155 LBS | DIASTOLIC BLOOD PRESSURE: 72 MMHG | HEIGHT: 67 IN | OXYGEN SATURATION: 99 % | HEART RATE: 101 BPM | SYSTOLIC BLOOD PRESSURE: 120 MMHG

## 2017-08-28 DIAGNOSIS — F39 MOOD DISORDER (H): ICD-10-CM

## 2017-08-28 DIAGNOSIS — Z98.890 STATUS POST LUMBAR MICRODISCECTOMY: Primary | ICD-10-CM

## 2017-08-28 DIAGNOSIS — G89.28 CHRONIC PAIN FOLLOWING SURGERY OR PROCEDURE: ICD-10-CM

## 2017-08-28 DIAGNOSIS — M96.89 POSTOPERATIVE SURGICAL COMPLICATION INVOLVING MUSCULOSKELETAL SYSTEM ASSOCIATED WITH MUSCULOSKELETAL PROCEDURE, UNSPECIFIED COMPLICATION: ICD-10-CM

## 2017-08-28 DIAGNOSIS — Z79.899 CONTROLLED SUBSTANCE AGREEMENT SIGNED: ICD-10-CM

## 2017-08-28 DIAGNOSIS — T14.8XXA WOUND INFECTION: ICD-10-CM

## 2017-08-28 DIAGNOSIS — L08.9 WOUND INFECTION: ICD-10-CM

## 2017-08-28 PROCEDURE — 80307 DRUG TEST PRSMV CHEM ANLYZR: CPT | Mod: 90 | Performed by: PHYSICIAN ASSISTANT

## 2017-08-28 PROCEDURE — 99000 SPECIMEN HANDLING OFFICE-LAB: CPT | Performed by: PHYSICIAN ASSISTANT

## 2017-08-28 PROCEDURE — 99214 OFFICE O/P EST MOD 30 MIN: CPT | Performed by: PHYSICIAN ASSISTANT

## 2017-08-28 RX ORDER — OXYCODONE HYDROCHLORIDE 10 MG/1
10 TABLET ORAL 3 TIMES DAILY PRN
Qty: 120 TABLET | Refills: 0 | Status: SHIPPED | OUTPATIENT
Start: 2017-09-18 | End: 2017-09-20

## 2017-08-28 RX ORDER — OXYCODONE HYDROCHLORIDE 10 MG/1
10 TABLET ORAL 3 TIMES DAILY PRN
Qty: 120 TABLET | Refills: 0 | Status: SHIPPED | OUTPATIENT
Start: 2017-08-28 | End: 2017-08-28

## 2017-08-28 RX ORDER — GABAPENTIN 300 MG/1
600 CAPSULE ORAL AT BEDTIME
Qty: 180 CAPSULE | Refills: 1 | Status: SHIPPED | OUTPATIENT
Start: 2017-08-28 | End: 2022-11-21

## 2017-08-28 ASSESSMENT — ANXIETY QUESTIONNAIRES
2. NOT BEING ABLE TO STOP OR CONTROL WORRYING: SEVERAL DAYS
1. FEELING NERVOUS, ANXIOUS, OR ON EDGE: MORE THAN HALF THE DAYS
7. FEELING AFRAID AS IF SOMETHING AWFUL MIGHT HAPPEN: SEVERAL DAYS
GAD7 TOTAL SCORE: 13
IF YOU CHECKED OFF ANY PROBLEMS ON THIS QUESTIONNAIRE, HOW DIFFICULT HAVE THESE PROBLEMS MADE IT FOR YOU TO DO YOUR WORK, TAKE CARE OF THINGS AT HOME, OR GET ALONG WITH OTHER PEOPLE: SOMEWHAT DIFFICULT
3. WORRYING TOO MUCH ABOUT DIFFERENT THINGS: SEVERAL DAYS
5. BEING SO RESTLESS THAT IT IS HARD TO SIT STILL: NEARLY EVERY DAY
6. BECOMING EASILY ANNOYED OR IRRITABLE: NEARLY EVERY DAY

## 2017-08-28 ASSESSMENT — PATIENT HEALTH QUESTIONNAIRE - PHQ9
5. POOR APPETITE OR OVEREATING: MORE THAN HALF THE DAYS
SUM OF ALL RESPONSES TO PHQ QUESTIONS 1-9: 15

## 2017-08-28 NOTE — LETTER
September 21, 2017    Ko Bell  [address] [place] [address]    Dear Ko,        You have been referred to [facility]. We have been unable to contact you by telephone to schedule your appointment. Please call our clinic at [phone] to schedule this appointment.       Sincerely,        [facility]

## 2017-08-28 NOTE — LETTER
[facility] [place]    08/28/17    Patient: Ko Bell  YOB: 1980  Medical Record Number: [id number]                                                                  Controlled Substance Agreement  I understand that my care provider has prescribed controlled substances (narcotics, tranquilizers, and/or stimulants) to help manage my condition(s).  I am taking this medicine to help me function or work.  I know that this is strong medicine.  It could have serious side effects and even cause a dependency on the drug.  If I stop these medicines suddenly, I could have severe withdrawal symptoms.    The risks, benefits, and side effects of these medication(s) were explained to me.  I agree that:  1. I will take part in other treatments as advised by my provider.  This may be psychiatry or counseling, physical therapy, behavioral therapy, group treatment, or a referral to a pain clinic.  I will reduce or stop my medicine when my provider tells me to do so.   2. I will take my medicines as prescribed.  I will not change the dose or schedule unless my provider tells me to.  There will be no refills if I  run out early.   I may be contacted at any time without warning and asked to complete a drug test or pill count.   3. I will keep all my appointments at the clinic.  If I miss appointments or fail to follow instructions, my provider may stop my medicine.  4. I will not ask other providers to prescribe controlled substances. And I will not accept controlled substances from other people. If I need another prescribed controlled substance for a new reason, I will notify my provider within one business day.  5. If I enroll in the [place] Medical Marijuana program, I will tell my provider.  I will also sign an agreement to share my medical records with my provider.  6. I will use one pharmacy to fill all of my controlled substance prescriptions.  If my prescription is mailed to my pharmacy, it may  take 5 to 7 days for my medicine to be ready.  7. I understand that my provider, clinic care team, and pharmacy can track controlled substance prescriptions from other providers through a central database (prescription monitoring program).  8. I will bring in my list of medications (or my medicine bottles) each time I come to the clinic.  REV- 04/2016                                                                                                                                            Page 1 of 2      [facility] [place]    08/28/17    Patient: Ko Bell  YOB: 1980  Medical Record Number: [id number]    9. Refills of controlled substances will be made only during office hours.  It is up to me to make sure that I do not run out of my medicines on weekends or holidays.    10. I am responsible for my prescriptions.  If the medicine is lost or stolen, it will not be replaced.   I also agree not to share these medicines with anyone.  11. I agree to not use ANY illegal or recreational drugs.  This includes marijuana, cocaine, bath salts or other drugs.  I agree not to use alcohol unless my provider says I may.  I agree to give urine samples whenever asked.  If I fail to give a urine sample, the provider may stop my medicine.     12. I will tell my nurse or provider right away if I become pregnant or have a new medical problem treated outside of [facility].  13. I understand that this medicine can affect my thinking and judgment.  It may be unsafe for me to drive, use machinery and do dangerous tasks.  I will not do any of these things until I know how the medicine affects me.  If my dose changes, I will wait to see how it affects me.  I will contact my provider if I have concerns about medicine side effects.  I understand that if I do not follow any of the conditions above, my prescriptions or treatment may be stopped.    I agree that my provider, clinic care team, and pharmacy may  work with any city, state or federal law enforcement agency that investigates the misuse, sale, or other diversion of my controlled medicine. I will allow my provider to discuss my care with or share a copy of this agreement with any other treating provider, pharmacy or emergency room where I receive care.  I agree to give up (waive) any right of privacy or confidentiality with respect to these authorizations.   I have read this agreement and have asked questions about anything I did not understand.   ___________________________________    ___________________________  Patient Signature                                                           Date and Time  ___________________________________     ____________________________  Witness                                                                            Date and Time  ___________________________________  Bethany James PA-C  REV-  04/2016                                                                                                                                                                 Page 2 of 2  Opioid Pain Medicines (also known as Narcotics)  What You Need to Know      What are opioids?   Opioids are pain medicines that must be prescribed by a doctor. Examples are:     morphine (MS Contin, Mila)    oxycodone (Oxycontin)    oxycodone and acetaminophen (Percocet)    hydrocodone and acetaminophen (Vicodin, Norco)     fentanyl patch (Duragesic)     hydromorphone (Dilaudid)     methadone     What do opioids do well?   Opioids are best for short-term pain after a surgery or injury. They also work well for cancer pain. Unlike other pain medicines, they do not cause liver or kidney failure or ulcers. They may help some people with long-lasting (chronic) pain.     What do opioids NOT do well?   Opioids never get rid of pain entirely, and they do not work well for most patients with chronic pain. Opioids do not reduce swelling, one of the causes of  pain. They also don t work well for nerve pain.     Side effects  Talk to your doctor before you start or decide to keep taking one of these medicines. Side effects include:    Lowers your breathing rate enough that it could cause death    Death due to taking more than the prescribed dose    Serious lifelong opioid use      Dependence is not the same as addiction. Addiction is when people keep using a substance that harms their body, their mind or their relations with others. If you have a history of drug or alcohol abuse, taking opioids can cause a relapse.  Over time, opioids don t work as well. Most people will need higher and higher doses. The higher the dose, the more serious the side effects. We don t know the long-term effects of opioids.   People who have used opioids for a long time have a lower quality of life, worse depression, higher levels of pain and more visits to doctors.  Overdose from prescription drugs is the second leading cause of death in the [place] The risk of overdose rises when opioids are taken with other drugs such as:    Medicines used for anxiety and panic attacks (such as lorazepam, alprazolam, clonazepam    Other sedatives    Alcohol    Illegal drugs such as heroin  Never share your opioids with others. Be sure to store opioids in a secure place, locked if possible.Young children can easily swallow them and overdose.     Are there other ways to manage pain?  Ways to help reduce pain:    Exercise every day.    Treat health problems that may be causing pain.    Treat mental health problems like depression and anxiety.     Worse depression symptoms; Less pleasure in things you usually enjoy    Feeling tired or sluggish    Slower thoughts or cloudy thinking    Being more sensitive to pain over time; Pain is harder to control.    Trouble sleeping or restless sleep    Changes in hormone levels (for example, less testosterone).     Changes in sex drive or ability to have sex    Long lasting  nausea and constipation    Trouble breathing while asleep; This is worse with lung problems like COPD or sleep apnea.    Unsafe driving    Getting sick more often    Itching    Feeling dizzy    Dry mouth    Sweating    Trouble emptying the bladder (peeing). This is worse if you have an enlarged prostate or get urinary tract infections (UTIs).    What else should I know about opioids?  When someone takes opioids for too long or too often, they become dependent. This means that if you stop or reduce the medicine too quickly, you will have withdrawal symptoms.          Practice good sleep habits.  Try to go to bed and get up at the same time every day.    Stop smoking.  Tobacco use can make pain worse.    Do things that you enjoy.    Find a way to work through pain without drugs.  Try deep breathing, meditation, visual imagery and aromatherapy.    Ask your doctor to help you create a plan to manage your pain.

## 2017-08-28 NOTE — LETTER
REPORT OF WORK COMP    PATIENT DATA    Employee Name: Ko Bell      : 1980     #: [id number]    Work related injury: Yes  Employer at time of injury: [employer]  Workers' Compensation Carrier/Managed Care Plan:  [employer]    Today's date: 2017  Date of injury: 2016  Date of first visit: 2017    PROVIDER EVALUATION: Persistent postoperative pain    1. Diagnosis: Chronic postoperative pain     2. Treatment: numerous surgeries..  3. Medication: Oxycodone, gabapentin  NOTE: When ordering a medication, [place] Rules require Work Comp or WC on prescriptions.    4. Unable to return to work as of 2017  5. Return to work date: TBD, next evaluation with pain managment     RESTRICTIONS: Unlimited unless listed.  Restrictions apply to home and leisure also.  If work restrictions is not available, the employee is totally disabled.    Maximum Medical Improvement (Date): n/a    Provider comments: Patient still suffers from chronic postoperative pain, unable to return to work at this time.    Bethany James MS, PA-C  License or registration: [id number] - [place]    Next appointment: Within next month with pain management.    CC: Employer, Managed Care Plan/Payor, Patient

## 2017-08-28 NOTE — NURSING NOTE
"Chief Complaint   Patient presents with     Forms     Workers Comp     Recheck Medication       Initial /72 (BP Location: Left arm, Patient Position: Chair, Cuff Size: Adult Regular)  Pulse 101  Temp 98  F (36.7  C) (Oral)  Ht 5' 7\" (1.702 m)  Wt 155 lb (70.3 kg)  SpO2 99%  BMI 24.28 kg/m2 Estimated body mass index is 24.28 kg/(m^2) as calculated from the following:    Height as of this encounter: 5' 7\" (1.702 m).    Weight as of this encounter: 155 lb (70.3 kg).  Medication Reconciliation: complete   Csaba Mlnarik CMA    "
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
## 2017-08-28 NOTE — MR AVS SNAPSHOT
After Visit Summary   8/28/2017    Ko Bell    MRN: [id number]           Patient Information     Date Of Birth          1980        Visit Information        Provider Department      8/28/2017 11:20 AM Bethany James PA-C [facility] [place]        Today's Diagnoses     Status post lumbar microdiscectomy    -  1    Wound infection        Chronic pain following surgery or procedure        Mood disorder (H)        Controlled substance agreement signed        Postoperative surgical complication involving musculoskeletal system associated with musculoskeletal procedure, unspecified complication           Follow-ups after your visit        Additional Services     PAIN MANAGEMENT CENTER ([place]) REFERRAL       Your provider has referred you to the [facility].    Reason for Referral: Comprehensive Evaluation and Management    Please complete the following questions:    What is your diagnosis for the patient's pain? unclear    Do you have any specific questions for the pain specialist? Yes: Long term pain from postoperative complications    Are there any red flags that may impact the assessment or management of the patient?  None    **ANY DIAGNOSTIC TESTS THAT ARE NOT IN EPIC SHOULD BE SENT TO THE PAIN CENTER**    Please note the Pre-Op Pain Consult must be scheduled 2-3 weeks prior to the patient's surgery.  Patient's trying to schedule within 2 weeks of surgery may not be accommodated.     Pre-Op Pain Consults are only good for 30 days.    REGARDING OPIOID MEDICATIONS:  We will always address appropriateness of opioid pain medications, but we generally will not automatically take on a prescribing role. When we do take on prescribing of opioids for chronic pain, it is in collaboration with the referring physician for an intermediate period of time (months), with an expectation that the primary physician or provider will assume the prescribing role if  medications are effective at stable doses with demonstrated compliance.  Therefore, please do not assume that your prescribing responsibilities end on the day of pain clinic consultation.  Is this agreeable to you? YES    For any questions, contact the [facility] at [phone].    Please be aware that coverage of these services is subject to the terms and limitations of your health insurance plan.  Call member services at your health plan with any benefit or coverage questions.      Please bring the following with you to your appointment:    (1) Any X-Rays, CTs or MRIs which have been performed.  Contact the facility where they were done to arrange for  prior to your scheduled appointment.    (2) List of current medications   (3) This referral request   (4) Any documents/labs given to you for this referral                  Your next 10 appointments already scheduled     Aug 30, 2017 11:05 AM CDT   Return Visit with Jenny Allen PA-C   [facility] ([facility])    [address] [place] [id number] [phone]              Who to contact     If you have questions or need follow up information about today's clinic visit or your schedule please contact [facility] directly at [phone].  Normal or non-critical lab and imaging results will be communicated to you by MyChart, letter or phone within 4 business days after the clinic has received the results. If you do not hear from us within 7 days, please contact the clinic through MyChart or phone. If you have a critical or abnormal lab result, we will notify you by phone as soon as possible.  Submit refill requests through [employer] or call your pharmacy and they will forward the refill request to us. Please allow 3 business days for your refill to be completed.          Additional Information About Your Visit        MyChart Information     [employer]  "gives you secure access to your electronic health record. If you see a primary care provider, you can also send messages to your care team and make appointments. If you have questions, please call your primary care clinic.  If you do not have a primary care provider, please call [phone] and they will assist you.        Care EveryWhere ID     This is your Care EveryWhere ID. This could be used by other organizations to access your [place] medical records  [id number]        Your Vitals Were     Pulse Temperature Height Pulse Oximetry BMI (Body Mass Index)       101 98  F (36.7  C) (Oral) 5' 7\" (1.702 m) 99% 24.28 kg/m2        Blood Pressure from Last 3 Encounters:   08/28/17 120/72   06/07/17 140/90   05/22/17 123/67    Weight from Last 3 Encounters:   08/28/17 155 lb (70.3 kg)   06/07/17 158 lb 8 oz (71.9 kg)   05/17/17 165 lb (74.8 kg)              We Performed the Following     Drug  Screen Comprehensive , Urine with Reported Meds (MedTox) (Pain Care Package)     PAIN MANAGEMENT CENTER ([place]) REFERRAL          Today's Medication Changes          These changes are accurate as of: 8/28/17 12:06 PM.  If you have any questions, ask your nurse or doctor.               Start taking these medicines.        Dose/Directions    oxyCODONE 10 MG IR tablet   Commonly known as:  ROXICODONE   Used for:  Postoperative surgical complication involving musculoskeletal system associated with musculoskeletal procedure, unspecified complication   Started by:  Bethany James PA-C        Dose:  10 mg   Start taking on:  9/18/2017   Take 1 tablet (10 mg) by mouth 3 times daily as needed for moderate to severe pain (can take 1 additional tablet daily if needed per day)   Quantity:  120 tablet   Refills:  0         These medicines have changed or have updated prescriptions.        Dose/Directions    gabapentin 300 MG capsule   Commonly known as:  NEURONTIN   This may have changed:    - how much to take  - how to take " this  - when to take this  - additional instructions   Used for:  Mood disorder (H)   Changed by:  Bethany James PA-C        Dose:  600 mg   Take 2 capsules (600 mg) by mouth At Bedtime For sleep, pain, anger, mood, anxiety.   Quantity:  180 capsule   Refills:  1            Where to get your medicines      These medications were sent to [facility] - [place], [place] - [address]  [address], [place] [address]     Phone:  [phone]     gabapentin 300 MG capsule         Some of these will need a paper prescription and others can be bought over the counter.  Ask your nurse if you have questions.     Bring a paper prescription for each of these medications     oxyCODONE 10 MG IR tablet                Primary Care Provider Office Phone # Fax #    Bethany James PA-C [phone]       [facility] [address] [place] [address]        Equal Access to Services     CONSTANCE FERRO : Hadii ashley ku hadasho Soomaali, waaxda luqadaha, qaybta kaalmada adeegyada, waxay duyenin hayangelican brooke rawls . So [facility] [phone].    ATENCIÓN: Si habla español, tiene a lares disposición servicios gratuitos de asistencia lingüística. Llame al [phone].    We comply with applicable federal civil rights laws and [place] laws. We do not discriminate on the basis of race, color, national origin, age, disability sex, sexual orientation or gender identity.            Thank you!     Thank you for choosing [facility]  for your care. Our goal is always to provide you with excellent care. Hearing back from our patients is one way we can continue to improve our services. Please take a few minutes to complete the written survey that you may receive in the mail after your visit with us. Thank you!             Your Updated Medication List - Protect others around you: Learn how to safely use, store and throw away your medicines at  www.disposemymeds.org.          This list is accurate as of: 8/28/17 12:06 PM.  Always use your most recent med list.                   Brand Name Dispense Instructions for use Diagnosis    gabapentin 300 MG capsule    NEURONTIN    180 capsule    Take 2 capsules (600 mg) by mouth At Bedtime For sleep, pain, anger, mood, anxiety.    Mood disorder (H)       lidocaine 4 % solution    XYLOCAINE    50 mL    Apply topically every 4 hours as needed for moderate pain    Wound infection       naloxone nasal spray    NARCAN    0.2 mL    Spray 1 spray (4 mg) into one nostril alternating nostrils as needed for opioid reversal (every 2-3 minutes until assistance arrives.)    Postoperative pain, Other chronic postprocedural pain       oxyCODONE 10 MG IR tablet   Start taking on:  9/18/2017    ROXICODONE    120 tablet    Take 1 tablet (10 mg) by mouth 3 times daily as needed for moderate to severe pain (can take 1 additional tablet daily if needed per day)    Postoperative surgical complication involving musculoskeletal system associated with musculoskeletal procedure, unspecified complication

## 2017-08-29 ASSESSMENT — ANXIETY QUESTIONNAIRES: GAD7 TOTAL SCORE: 13

## 2017-09-05 LAB — PAIN DRUG SCR UR W RPTD MEDS: NORMAL

## 2017-09-06 PROBLEM — Z91.148 CONTROLLED SUBSTANCE AGREEMENT BROKEN: Status: ACTIVE | Noted: 2017-08-28

## 2017-09-06 PROBLEM — F15.20 METHAMPHETAMINE ADDICTION (H): Status: ACTIVE | Noted: 2017-09-06

## 2017-09-14 NOTE — TELEPHONE ENCOUNTER
Patient returned call to schedule new evaluation. Patient would like to be seen at the [place] location. Currently waiting for new provider schedules to open up. Advised to call back in 2 weeks to schedule with new provider if he hasn't heard from us in that time frame.    Hanny Angel    [facility]

## 2017-09-15 ENCOUNTER — MYC MEDICAL ADVICE (OUTPATIENT)
Dept: FAMILY MEDICINE | Facility: CLINIC | Age: 37
End: 2017-09-15

## 2017-09-15 NOTE — TELEPHONE ENCOUNTER
Forwarded to LP.  Please review patient's Mychart message and advise.  Melyssa Lares, RN, BS, PHN

## 2017-09-20 ENCOUNTER — TELEPHONE (OUTPATIENT)
Dept: FAMILY MEDICINE | Facility: CLINIC | Age: 37
End: 2017-09-20

## 2017-09-20 DIAGNOSIS — Z91.148 CONTROLLED SUBSTANCE AGREEMENT BROKEN: Primary | ICD-10-CM

## 2017-09-20 DIAGNOSIS — F15.20 METHAMPHETAMINE ADDICTION (H): ICD-10-CM

## 2017-09-20 DIAGNOSIS — G89.28 CHRONIC PAIN FOLLOWING SURGERY OR PROCEDURE: ICD-10-CM
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
## 2017-09-21 NOTE — TELEPHONE ENCOUNTER
Called patient to schedule new eval. Recording stating that the number is not reachable. Unable to LM. Letter sent.     Hanny Angel    [facility]

## 2017-10-09 NOTE — TELEPHONE ENCOUNTER
[facility] Management Center Referral      1. Confirmed address with patient? Yes  2. Confirmed phone number with patient? Yes  3. Confirmed referring provider? Yes  4. Is the PCP the same as the referring provider? Yes  5. Has the patient been to any previous pain clinics? No  (If yes, send GAGE with welcome letter)  6. Which insurance are we to bill for this appointment?  HP    7. Informed pt of cancellation (48 hour) policy? Yes    REGARDING OPIOID MEDICATIONS: We will always address appropriateness of opioid pain medications, but we generally will not automatically take on a prescribing role. When we do take on prescribing of opioids for chronic pain, it is in collaboration with the referring physician for an intermediate period of time (months), with an expectation that the primary physician or provider will assume the prescribing role if medications are effective at stable doses with demonstrated compliance. Therefore, please do not assume that your prescribing responsibilities end on the day of pain clinic consultation.  7. Informed pt of prescribing policy? Yes      8. Referring Provider: Bethany James     9. Criteria for Tr

## 2018-01-22 ENCOUNTER — TELEPHONE (OUTPATIENT)
Dept: LAB | Facility: CLINIC | Age: 38
End: 2018-01-22

## 2018-01-22 NOTE — TELEPHONE ENCOUNTER
Labs on  have  and a letter was sent on 2017. Patient has not followed up. Routing to team to address.    [facility]

## 2018-01-22 NOTE — TELEPHONE ENCOUNTER
LOV 8/28/2017  Lab was ordered on 6/7/2017 - does PCP still want lab?   Beta 2 transferrin   Please advise     Thank you     Barbara Cooper RN, BSN  [facility]

## 2018-08-25 NOTE — TELEPHONE ENCOUNTER
Called and spoke to patient's wife Reva. Informed her that Dr. Toth and Kirt Madera PA-C strongly advise patient go to ED for evaluation. Reva reports patient Ko is still in quite a bit of pain. Informed her that when he is seen in the ED to ask the ER physician for prescription for pain medication until our office can write a script for patient next week. Reva verbalized understanding and will bring Ko to [facility] ER in [place].   
Patient's wife, Reva, called clinic. Patient is s/p L4-5 microdiscectomy from 1/3/17 with Dr. Toth. Patient last seen on 1/10/17 for an incision check with Letty Vincent CNP, who prescribed Keflex. Reva called today stating the swelling around the incision has increased, he's developed foot numbness, and he's still in a lot of pain. Will communicate with Dr. Toth and Kirt Madera PA-C for recommendation.   
Spoke with Kirt Madera PA-C and Dr. Toth, who recommend patient be seen in the ED for evaluation. Called all numbers listed, but no answer and no voicemail available to leave a message. Awaiting patient call back.   
Hopelessness/Agitation/severe anxiety

## 2019-12-15 ENCOUNTER — HEALTH MAINTENANCE LETTER (OUTPATIENT)
Age: 39
End: 2019-12-15

## 2020-06-11 NOTE — PROGRESS NOTES
Plastic Surgery    Patient reports no change in pain  Vac in place and functioning  No significant erythema    On Ancef with plans to transition to Bactrim DS at discharge  Likely first vac change tomorrow     Detail Level: Detailed Detail Level: Generalized

## 2021-01-15 ENCOUNTER — HEALTH MAINTENANCE LETTER (OUTPATIENT)
Age: 41
End: 2021-01-15

## 2021-04-23 NOTE — H&P
DATE OF ADMISSION:  05/17/2017      PRIMARY CARE PROVIDER:  Bethany James PA-C       CHIEF COMPLAINT:  Wound infection.      HISTORY OF PRESENT ILLNESS:  Mr. Ko Bell is a 37-year-old male who was sent to the emergency room from clinic for evaluation of seroma with concerns for infection.  The patient had initially a back injury related diskectomy by Dr. Toth on 01/03/2017.  His initial surgery was complicated by a postoperative seroma 2 weeks later.  He was initially managed conservatively and eventually Dr. Toth took him back to the operating room for I&D.  Cultures were nonrevealing at that time and the serous fluid was reportedly removed without complication.  The patient was subsequently seen in the clinic on 02/13 and was noted to have recurrence of seroma.  Subsequent to that, the seroma was aspirated and pressure dressing was applied.  The seroma continue to recur and the patient was referred to Plastic Surgery.  He had another excision of the seroma performed by Dr. Palma on 04/25/2017.  He had a NINI drain put him in postoperatively.  Subsequently NINI drain was removed a couple of days ago because he started having redness, warmth and purulent drainage from the NINI tubing.  He was also febrile.  Reportedly, he was started on Keflex 500 mg t.i.d. and the drainage was cultured.  I do not have the details of the culture report at this time.  However, the patient continued to have pain, worsening swelling around the incision and erythema that was spreading and went to his primary care provider from where he was sent to the emergency room.  As far as pertinent review of system is concerned, he denies any chest pain, no shortness of breath.  No vomiting or diarrhea.  Denies dysuria, urinary urgency or frequency.      In the emergency room, the patient was evaluated by Dr. Vides.  She did discuss with Dr. Palma and the plan is for I&D and washout later this afternoon.  In the meantime, he is being  empirically started on vancomycin and Zosyn.      PAST MEDICAL HISTORY:     1.  Recent diskectomy for a work-related injury.   2.  Bipolar disorder.      SOCIAL AND PERSONAL HISTORY:  Lives with his girlfriend, currently unemployed.  Smokes about 1 pack of cigarettes per day.  Denies alcohol or recreational drug use.      FAMILY HISTORY:  Reviewed and is noncontributory.      HOME MEDICATIONS:    Prescriptions Prior to Admission   Medication Sig Dispense Refill Last Dose     Morphine Sulfate (MS CONTIN PO) Take 30 mg by mouth 2 times daily as needed for moderate to severe pain   5/14/2017     OXYCODONE HCL PO Take 10 mg by mouth 2 times daily Am and hs   5/17/2017 at 0700     OXYCODONE HCL PO Take 5 mg by mouth every 24 hours At lunch        OXYCODONE HCL PO Take 5-10 mg by mouth daily as needed        CEPHALEXIN PO Take 500 mg by mouth 4 times daily   5/17/2017 at am     gabapentin (NEURONTIN) 300 MG capsule Take 1 capsule (300 mg) for 5 days, then increase to 2 capsules (600 mg). For sleep, pain, anger, mood, anxiety. 90 capsule 1 yet to start     CLINDAMYCIN HCL PO Take 300 mg by mouth 3 times daily For ten days, yet to start        LEVOFLOXACIN PO Take 500 mg by mouth daily For ten days, yet to start             REVIEW OF SYSTEMS:  A complete review of system was done and was negative for anything else other than that mentioned in the HPI.      PHYSICAL EXAMINATION:   GENERAL:  Mr. Bell is a 37-year-old male.  He is not in any obvious distress.   VITAL SIGNS:  Temperature 100.2, blood pressure 123/74, heart rate 110, respiration rate 16, O2 sat 99% on room air.   HEENT:  Atraumatic, normocephalic, no pallor or icterus.   NECK:  Supple, with good range of motion.   RESPIRATORY:  Good air entry bilaterally with normal effort of breathing.   CARDIOVASCULAR:  Regular rate and rhythm.  There is no murmur.   GASTROINTESTINAL:  Abdomen is soft, nontender.   SKIN:  Warm and dry.   BACK:  He has swelling around the  L4-L5 area with surrounding erythema.  The swelling is right around his old surgical incision and it has fluctuance to it.  There is no obvious drainage at this time.   NEUROLOGIC:  He is awake, alert, oriented.  Cranial nerves 2-12 intact, moving all 4 extremities without any problem.      LABORATORY AND DIAGNOSTIC DATA:  Chemistries are within normal limits.  Lactic acid is 0.7.  White cell count is 9.8, platelet is 143.  Sed rate is 29.      ASSESSMENT AND PLAN:  Mr. Ko Stark is a 37-year-old male who presents to the emergency room with worsening erythema, pain and swelling around his postoperative wound site.   1.  Postoperative wound infection with sepsis related to this.  The patient presents with worsening erythema, swelling and pain and surrounding cellulitis around his postoperative seroma site.  He has had multiple aspirations and conservative approach to treatment for this and looks like it has not gotten better.  Now is the concern is for infection.  At this time, he will be started on vancomycin and Zosyn.  Dr. Palma requested hospitalist for admission.  The patient will be admitted to [facility]osp[facility].  Dr. Palma will see the patient today for wash out and I&D.  I will also consult Infectious Disease for antibiotic guidance.  Hopefully, will get culture data from the wound to guide us with antibiotics.  At this time, he will be n.p.o. I will put him on normal saline at 100 mL per hour.  I will also request for the culture data from the outpatient setting to guide antibiotics that was done 2 days ago.      Anticipated length of stay more than 2 midnights.      CODE STATUS:  Full code.         UZIEL ARGUETA MD             D: 2017 15:31   T: 2017 16:00   MT: mg      Name:     KO STARK   MRN:      [id number]        Account:      [id number]   :      1980           Admitted:     [id number]      Document: [id number]       cc: Bethany James PA-C      no

## 2021-09-04 ENCOUNTER — HEALTH MAINTENANCE LETTER (OUTPATIENT)
Age: 41
End: 2021-09-04

## 2022-02-13 ENCOUNTER — HEALTH MAINTENANCE LETTER (OUTPATIENT)
Age: 42
End: 2022-02-13

## 2022-10-16 ENCOUNTER — HEALTH MAINTENANCE LETTER (OUTPATIENT)
Age: 42
End: 2022-10-16

## 2022-11-20 ENCOUNTER — MYC MEDICAL ADVICE (OUTPATIENT)
Dept: FAMILY MEDICINE | Facility: CLINIC | Age: 42
End: 2022-11-20

## 2022-11-21 ENCOUNTER — MYC MEDICAL ADVICE (OUTPATIENT)
Dept: FAMILY MEDICINE | Facility: CLINIC | Age: 42
End: 2022-11-21

## 2022-11-21 NOTE — TELEPHONE ENCOUNTER
Please advise if pt can see you as a virtual visit to address his bipolar concerns or the best step  Jared BOX RN, BSN

## 2022-11-21 NOTE — TELEPHONE ENCOUNTER
Second encounter regarding this issue:     I have not seen this patient since 2017.  I am not a DOT certified provider so if he needs a DOT certification he needs to see someone who is certified.  Regardless, he needs a visit with someone to address this issue.

## 2022-11-21 NOTE — TELEPHONE ENCOUNTER
I have not seen this patient since 2017.  I am not a DOT certified provider so if he needs a DOT certification he needs to see someone who is certified.  Regardless, he needs a visit with someone to address this issue.      Bethany James MBA, MS, PA-C  [facility]

## 2022-11-21 NOTE — TELEPHONE ENCOUNTER
Patient has sent multiple mycharts. 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 second issue I have bipolar disorder all over my medical records you sent me to [place] I believe during all of this mess I do not believe I am bipolar number one and I don t think they did in [place] either somebody needs to put it in writing that I am not mentally insaneAnnette 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

## 2022-11-21 NOTE — TELEPHONE ENCOUNTER
Can do a 40 minute video visit on Wednesday.  Due to the length of time since seeing him he is considered a new patient.  OK to use my same day spots      Bethany James MBA, MS, PA-C  [facility]

## 2022-11-23 ENCOUNTER — VIRTUAL VISIT (OUTPATIENT)
Dept: FAMILY MEDICINE | Facility: CLINIC | Age: 42
End: 2022-11-23
Payer: COMMERCIAL

## 2022-11-23 ENCOUNTER — MYC MEDICAL ADVICE (OUTPATIENT)
Dept: FAMILY MEDICINE | Facility: CLINIC | Age: 42
End: 2022-11-23

## 2022-11-23 DIAGNOSIS — Z78.9 HISTORY OF INCARCERATION: ICD-10-CM

## 2022-11-23 DIAGNOSIS — Z98.890 STATUS POST LUMBAR MICRODISCECTOMY: ICD-10-CM

## 2022-11-23 DIAGNOSIS — F11.21 OPIOID DEPENDENCE IN REMISSION (H): ICD-10-CM

## 2022-11-23 DIAGNOSIS — F39 MOOD DISORDER (H): Primary | ICD-10-CM

## 2022-11-23 DIAGNOSIS — F15.11 HISTORY OF METHAMPHETAMINE ABUSE (H): ICD-10-CM

## 2022-11-23 PROBLEM — L08.9 WOUND INFECTION: Status: RESOLVED | Noted: 2017-05-17 | Resolved: 2022-11-23

## 2022-11-23 PROBLEM — A41.9 SEPSIS (H): Status: RESOLVED | Noted: 2017-05-17 | Resolved: 2022-11-23

## 2022-11-23 PROBLEM — Z91.148 CONTROLLED SUBSTANCE AGREEMENT BROKEN: Status: RESOLVED | Noted: 2017-08-28 | Resolved: 2022-11-23

## 2022-11-23 PROBLEM — T14.8XXA WOUND INFECTION: Status: RESOLVED | Noted: 2017-05-17 | Resolved: 2022-11-23

## 2022-11-23 PROBLEM — F15.20 METHAMPHETAMINE ADDICTION (H): Status: RESOLVED | Noted: 2017-09-06 | Resolved: 2022-11-23

## 2022-11-23 PROCEDURE — 99205 OFFICE O/P NEW HI 60 MIN: CPT | Mod: 95 | Performed by: PHYSICIAN ASSISTANT

## 2022-11-23 ASSESSMENT — ANXIETY QUESTIONNAIRES
GAD7 TOTAL SCORE: 1
GAD7 TOTAL SCORE: 1
1. FEELING NERVOUS, ANXIOUS, OR ON EDGE: SEVERAL DAYS
5. BEING SO RESTLESS THAT IT IS HARD TO SIT STILL: NOT AT ALL
3. WORRYING TOO MUCH ABOUT DIFFERENT THINGS: NOT AT ALL
IF YOU CHECKED OFF ANY PROBLEMS ON THIS QUESTIONNAIRE, HOW DIFFICULT HAVE THESE PROBLEMS MADE IT FOR YOU TO DO YOUR WORK, TAKE CARE OF THINGS AT HOME, OR GET ALONG WITH OTHER PEOPLE: NOT DIFFICULT AT ALL
6. BECOMING EASILY ANNOYED OR IRRITABLE: NOT AT ALL
2. NOT BEING ABLE TO STOP OR CONTROL WORRYING: NOT AT ALL
7. FEELING AFRAID AS IF SOMETHING AWFUL MIGHT HAPPEN: NOT AT ALL

## 2022-11-23 ASSESSMENT — PATIENT HEALTH QUESTIONNAIRE - PHQ9
SUM OF ALL RESPONSES TO PHQ QUESTIONS 1-9: 0
5. POOR APPETITE OR OVEREATING: NOT AT ALL

## 2022-11-23 NOTE — PROGRESS NOTES
Amilcar is a 42 year old who is being evaluated via a billable video visit.      How would you like to obtain your AVS? MyChart  If the video visit is dropped, the invitation should be resent by: Text to cell phone: [phone]  Will anyone else be joining your video visit? No          Assessment & Plan     History of incarceration  Mood disorder (H)  Status post lumbar microdiscectomy  Opioid dependence in remission (H)  History of methamphetamine abuse (H)  Virtual visit today discussing recent incarceration in [place] from 3/14/2019 to 3/28/2021 for bladimir.  Patient had extensive mental health evaluation and was found not to carry/meet diagnosis of bipolar disorder per evaluator.  Patient reports that he is doing quite well overall and has remained sober since his release 2021.  He did obtain his high school diploma while incarcerated and feels that mentally he is in a very good place.  He plans to establish with a new primary care physician at [facility] near his home at the end of the month.  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  I applauded his sobriety, encouraged him to continue to attend support groups/meetings and to reach out in the future if any assistance is needed.        62 minutes spent on the date of the encounter doing chart review, history and exam, documentation and further activities per the note (extensive review of public court records)        Nicotine/Tobacco Cessation:  He reports that he has quit smoking. His smoking use included cigarettes. He has never used smokeless tobacco.        No follow-ups on file.    Bethany James PA-C  [facility]   Amilcar is a 42 year old, presenting for the following health issues:  Paperwork      History of Present Illness       Reason  "for visit: Trying to get DOT certification in the [facility].  Told i need from last pcp [facility] Acknowledgment of no need medication as well as acknowledgmen that I have not sought treatment for it as of late  Back surgery needs to be cleared both these issues need to be in writing    Patient was evaluated by Shirley Wilkinson CNP psychiatry 5/17/2017 at which time she did did not feel that he met the criteria of bipolar disorder but rather felt that he had mood disorder NOS and possible intermittent explosive disorder.  Additionally, the patient was tested for bipolar disorder during his court case/incarceration and they also did not feel that he met the criteria of bipolar disorder.  He did not do psychotherapy but did do a lot of self reflection and overall feels that he is doing much better.  He did achieve his high school diploma while incarcerated as well and is very proud of this achievement.  Mr. Bell has not been on medication since 2017 and \"feels better than he has in a long time \".    Regarding Mr. Bell's back issue -he has a history of a microdiscectomy in 2017 and his postoperative course was complicated by an infection that required a return to the OR with an I&D/IV antibiotics.  Today he states that he has been off of pain medication since 2017 when he was incarcerated and has no limitations in his function.  He exercises regularly while incarcerated and feels that he is strong/not limited by his previous surgeries.        Fax to [phone] attn: Dom Nathan D.C.    Establishing with a new PCP: Augustina Jurado MD - Nov 30th - [facility]      Social History     Tobacco Use     Smoking status: Some Days     Smokeless tobacco: Never     Tobacco comments:     less than PPD   Vaping Use     Vaping Use: Never used   Substance Use Topics     Alcohol use: No     Alcohol/week: 0.0 standard drinks     Comment: sober since 5/15/2015     Drug use: Not Currently     " Types: Methamphetamines     Comment: sober since incarceration 2017     PHQ 5/17/2017 8/28/2017 11/23/2022   PHQ-9 Total Score 22 15 0   Q9: Thoughts of better off dead/self-harm past 2 weeks More than half the days More than half the days Not at all     ROBERT-7 SCORE 5/17/2017 8/28/2017 11/23/2022   Total Score 11 13 1     Last PHQ-9 11/23/2022   1.  Little interest or pleasure in doing things 0   2.  Feeling down, depressed, or hopeless 0   3.  Trouble falling or staying asleep, or sleeping too much 0   4.  Feeling tired or having little energy 0   5.  Poor appetite or overeating 0   6.  Feeling bad about yourself 0   7.  Trouble concentrating 0   8.  Moving slowly or restless 0   Q9: Thoughts of better off dead/self-harm past 2 weeks 0   PHQ-9 Total Score 0   Difficulty at work, home, or with people Not difficult at all     ROBERT-7  11/23/2022   1. Feeling nervous, anxious, or on edge 1   2. Not being able to stop or control worrying 0   3. Worrying too much about different things 0   4. Trouble relaxing 0   5. Being so restless that it is hard to sit still 0   6. Becoming easily annoyed or irritable 0   7. Feeling afraid, as if something awful might happen 0   ROBERT-7 Total Score 1   If you checked any problems, how difficult have they made it for you to do your work, take care of things at home, or get along with other people? Not difficult at all       Suicide Assessment Five-step Evaluation and Treatment (SAFE-T)        Review of Systems   Constitutional, HEENT, cardiovascular, pulmonary, GI, , musculoskeletal, neuro, skin, endocrine and psych systems are negative, except as otherwise noted.      Objective           Vitals:  No vitals were obtained today due to virtual visit.    Physical Exam   GENERAL: Healthy, alert and no distress  EYES: Eyes grossly normal to inspection.  No discharge or erythema, or obvious scleral/conjunctival abnormalities.  HENT: Normal cephalic/atraumatic.  External ears, nose and mouth  without ulcers or lesions.  No nasal drainage visible.  NECK: No asymmetry, visible masses or scars  RESP: No audible wheeze, cough, or visible cyanosis.  No visible retractions or increased work of breathing.    SKIN: Visible skin clear. No significant rash, abnormal pigmentation or lesions.  NEURO: Cranial nerves grossly intact.  Mentation and speech appropriate for age.  PSYCH: Mentation appears normal, affect normal/bright, judgement and insight intact, normal speech and appearance well-groomed.                Video-Visit Details    Video Start Time: 9:18 AM    Type of service:  Video Visit    Video End Time:9:38 AM    Originating Location (pt. Location): Home        Distant Location (provider location):  On-site    Platform used for Video Visit: Appsperse

## 2022-11-23 NOTE — LETTER
" [address] [address]  [phone]       November 23, 2022    Ko Bell  [address] [address]    To Whom it May Concern:    The above patient was seen in my office today.  Prior to today, my last encounter with him was in 2017.      Mr. Bell had previously been diagnosed with bipolar disorder around 2010 but never had any formal testing at that time.  He was evaluated by Shirley Wilkinson CNP psychiatry 5/17/2017 at which time she did did not feel that he met the criteria of bipolar disorder but rather felt that he had mood disorder NOS and possible intermittent explosive disorder.  Additionally, the patient was tested for bipolar disorder during his court case/incarceration and they also did not feel that he met the criteria of bipolar disorder.  He did not do psychotherapy but did do a lot of self reflection and overall feels that he is doing much better.  He did achieve his high school diploma while incarcerated as well and is very proud of this achievement.  Mr. Bell has not been on medication since 2017 and \"feels better than he has in a long time \".    Regarding Mr. Bell's back issue -he has a history of a microdiscectomy in 2017 and his postoperative course was complicated by an infection that required a return to the OR with an I&D/IV antibiotics.  Today he reports that he has been off of pain medication since 2017 when he was incarcerated and has no limitations in his function.  He exercised regularly while incarcerated and feels that he is strong/not limited by his previous surgeries.    Based on the information that I have available today, I do feel that Mr. 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      Please contact me with " questions or concerns.    Sincerely,    Bethany James MBA, MS, PA-C  [facility]

## 2023-03-26 ENCOUNTER — HEALTH MAINTENANCE LETTER (OUTPATIENT)
Age: 43
End: 2023-03-26

## 2023-11-08 ENCOUNTER — TELEPHONE (OUTPATIENT)
Dept: NEUROSURGERY | Facility: CLINIC | Age: 43
End: 2023-11-08
Payer: COMMERCIAL

## 2023-11-08 NOTE — TELEPHONE ENCOUNTER
"Received pt letter from the [facility] -    Pt's letter states he is a surgical pt from 2017, he is requesting medication options because the [facility] will not prescribe narcotics and a letter for an additional mattress.    Pt last seen in clinic 3/13/2017 for seroma aspiration.     Spoke to Reva the nurse at the [facility]. Updated her that we have not seen the pt since 2017. Pt may schedule an appt if having concerns or he would need to seek recommendations from the provider in the [facility] or pts PCP. Reva states pt is trying to get pain meds. She request that writer documented pt has a hx of being a \"drug abuser\".    Writer told Reva to contact with any questions/concerns.           "

## 2024-06-01 ENCOUNTER — HEALTH MAINTENANCE LETTER (OUTPATIENT)
Age: 44
End: 2024-06-01
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
## 2025-06-14 ENCOUNTER — HEALTH MAINTENANCE LETTER (OUTPATIENT)
Age: 45
End: 2025-06-14

## (undated) DEVICE — DRAIN JACKSON PRATT 10FR ROUND SU130-1321

## (undated) DEVICE — SU VICRYL 0 CT-2 CR 8X18" J727D

## (undated) DEVICE — DRAIN JACKSON PRATT RESERVOIR 100ML SU130-1305

## (undated) DEVICE — CUSHION INSERT LG PRONE VIEW JACKSON TABLE

## (undated) DEVICE — LINEN TOWEL PACK X5 5464

## (undated) DEVICE — SPONGE LAP 18X18" X8435

## (undated) DEVICE — DRSG STERI STRIP 1/2X4" R1547

## (undated) DEVICE — PACK MINOR SBA15MIFSE

## (undated) DEVICE — PREP SKIN SCRUB TRAY 4461A

## (undated) DEVICE — SU VICRYL 2-0 CT-2 CR 8X18" J726D

## (undated) DEVICE — DRSG KERLIX FLUFFS X5

## (undated) DEVICE — DRAPE POUCH INSTRUMENT 1018

## (undated) DEVICE — DRSG GAUZE 4X4" 3033

## (undated) DEVICE — DRSG ABDOMINAL 07 1/2X8" 7197D

## (undated) DEVICE — PEN MARKING SKIN FINE 31145942

## (undated) DEVICE — ESU GROUND PAD UNIVERSAL W/O CORD

## (undated) DEVICE — GLOVE PROTEXIS W/NEU-THERA 7.5  2D73TE75

## (undated) DEVICE — DRAPE LAP TRANSVERSE 29421

## (undated) DEVICE — SOL NACL 0.9% IRRIG 1000ML BOTTLE 07138-09

## (undated) DEVICE — GLOVE PROTEXIS W/NEU-THERA 8.5  2D73TE85

## (undated) DEVICE — SYR BULB IRRIG DOVER 60 ML LATEX FREE 67000

## (undated) DEVICE — DRAPE LAP W/ARMBOARD 29410

## (undated) DEVICE — SUCTION TIP YANKAUER W/O VENT K86

## (undated) DEVICE — DRSG ADAPTIC 3X3" 6112

## (undated) DEVICE — NDL SPINAL 22GA 3.5" QUINCKE 405181

## (undated) DEVICE — TUBING SUCTION SOFT 20'X3/16" 0036570

## (undated) DEVICE — RX SURGIFLO HEMOSTATIC MATRIX W/THROMBIN 8ML NEXTGEN 2993

## (undated) DEVICE — GLOVE PROTEXIS BLUE W/NEU-THERA 8.5  2D73EB85

## (undated) DEVICE — ESU CORD BIPOLAR 12' E0509

## (undated) DEVICE — SU VICRYL 3-0 PS-1 18" UND J683

## (undated) DEVICE — DRSG KERLIX 4 1/2"X4YDS ROLL 6715

## (undated) DEVICE — PREP DURAPREP 26ML APL 8630

## (undated) DEVICE — PACK SPINE SM CUSTOM SNE15SSFSK

## (undated) DEVICE — DRSG DRAIN 4X4" 7086

## (undated) DEVICE — SUCTION FRAZIER 12FR W/HANDLE K73

## (undated) DEVICE — SU VICRYL 2-0 CT-1 27" J339H

## (undated) DEVICE — SU VICRYL 4-0 PS-2 18" UND J496H

## (undated) DEVICE — SU SILK 2-0 FSL 18" 677G

## (undated) DEVICE — DRAPE SHEET REV FOLD 3/4 9349

## (undated) RX ORDER — OXYCODONE HYDROCHLORIDE 5 MG/1
TABLET ORAL
Status: DISPENSED
Start: 2017-01-31

## (undated) RX ORDER — HYDROMORPHONE HYDROCHLORIDE 1 MG/ML
INJECTION, SOLUTION INTRAMUSCULAR; INTRAVENOUS; SUBCUTANEOUS
Status: DISPENSED
Start: 2017-01-31

## (undated) RX ORDER — CEFAZOLIN SODIUM 2 G/100ML
INJECTION, SOLUTION INTRAVENOUS
Status: DISPENSED
Start: 2017-04-25

## (undated) RX ORDER — GLYCOPYRROLATE 0.2 MG/ML
INJECTION, SOLUTION INTRAMUSCULAR; INTRAVENOUS
Status: DISPENSED
Start: 2017-01-31

## (undated) RX ORDER — FENTANYL CITRATE 50 UG/ML
INJECTION, SOLUTION INTRAMUSCULAR; INTRAVENOUS
Status: DISPENSED
Start: 2017-01-31

## (undated) RX ORDER — HYDROMORPHONE HYDROCHLORIDE 1 MG/ML
INJECTION, SOLUTION INTRAMUSCULAR; INTRAVENOUS; SUBCUTANEOUS
Status: DISPENSED
Start: 2017-05-17

## (undated) RX ORDER — FENTANYL CITRATE 50 UG/ML
INJECTION, SOLUTION INTRAMUSCULAR; INTRAVENOUS
Status: DISPENSED
Start: 2017-05-17

## (undated) RX ORDER — METHYLPREDNISOLONE ACETATE 40 MG/ML
INJECTION, SUSPENSION INTRA-ARTICULAR; INTRALESIONAL; INTRAMUSCULAR; SOFT TISSUE
Status: DISPENSED
Start: 2017-01-31

## (undated) RX ORDER — LIDOCAINE HYDROCHLORIDE 20 MG/ML
INJECTION, SOLUTION EPIDURAL; INFILTRATION; INTRACAUDAL; PERINEURAL
Status: DISPENSED
Start: 2017-04-25

## (undated) RX ORDER — FENTANYL CITRATE 50 UG/ML
INJECTION, SOLUTION INTRAMUSCULAR; INTRAVENOUS
Status: DISPENSED
Start: 2017-04-25

## (undated) RX ORDER — CEFAZOLIN SODIUM 2 G/100ML
INJECTION, SOLUTION INTRAVENOUS
Status: DISPENSED
Start: 2017-01-31

## (undated) RX ORDER — ONDANSETRON 2 MG/ML
INJECTION INTRAMUSCULAR; INTRAVENOUS
Status: DISPENSED
Start: 2017-01-31

## (undated) RX ORDER — KETOROLAC TROMETHAMINE 30 MG/ML
INJECTION, SOLUTION INTRAMUSCULAR; INTRAVENOUS
Status: DISPENSED
Start: 2017-01-31

## (undated) RX ORDER — PROPOFOL 10 MG/ML
INJECTION, EMULSION INTRAVENOUS
Status: DISPENSED
Start: 2017-05-17

## (undated) RX ORDER — OXYCODONE HYDROCHLORIDE 5 MG/1
TABLET ORAL
Status: DISPENSED
Start: 2017-04-25

## (undated) RX ORDER — GLYCOPYRROLATE 0.2 MG/ML
INJECTION, SOLUTION INTRAMUSCULAR; INTRAVENOUS
Status: DISPENSED
Start: 2017-04-25

## (undated) RX ORDER — HYDROMORPHONE HYDROCHLORIDE 1 MG/ML
INJECTION, SOLUTION INTRAMUSCULAR; INTRAVENOUS; SUBCUTANEOUS
Status: DISPENSED
Start: 2017-04-25

## (undated) RX ORDER — BUPIVACAINE HYDROCHLORIDE AND EPINEPHRINE 2.5; 5 MG/ML; UG/ML
INJECTION, SOLUTION EPIDURAL; INFILTRATION; INTRACAUDAL; PERINEURAL
Status: DISPENSED
Start: 2017-05-17

## (undated) RX ORDER — ONDANSETRON 2 MG/ML
INJECTION INTRAMUSCULAR; INTRAVENOUS
Status: DISPENSED
Start: 2017-04-25

## (undated) RX ORDER — ONDANSETRON 2 MG/ML
INJECTION INTRAMUSCULAR; INTRAVENOUS
Status: DISPENSED
Start: 2017-05-17

## (undated) RX ORDER — DEXAMETHASONE SODIUM PHOSPHATE 4 MG/ML
INJECTION, SOLUTION INTRA-ARTICULAR; INTRALESIONAL; INTRAMUSCULAR; INTRAVENOUS; SOFT TISSUE
Status: DISPENSED
Start: 2017-04-25

## (undated) RX ORDER — BUPIVACAINE HYDROCHLORIDE AND EPINEPHRINE 5; 5 MG/ML; UG/ML
INJECTION, SOLUTION EPIDURAL; INTRACAUDAL; PERINEURAL
Status: DISPENSED
Start: 2017-01-31

## (undated) RX ORDER — PROPOFOL 10 MG/ML
INJECTION, EMULSION INTRAVENOUS
Status: DISPENSED
Start: 2017-04-25

## (undated) RX ORDER — LIDOCAINE HYDROCHLORIDE 20 MG/ML
INJECTION, SOLUTION EPIDURAL; INFILTRATION; INTRACAUDAL; PERINEURAL
Status: DISPENSED
Start: 2017-05-17